# Patient Record
Sex: FEMALE | Race: WHITE | Employment: OTHER | URBAN - METROPOLITAN AREA
[De-identification: names, ages, dates, MRNs, and addresses within clinical notes are randomized per-mention and may not be internally consistent; named-entity substitution may affect disease eponyms.]

---

## 2018-03-14 RX ORDER — ACETAMINOPHEN 500 MG
500 TABLET ORAL EVERY 6 HOURS PRN
COMMUNITY
End: 2021-01-01

## 2018-03-14 NOTE — PROGRESS NOTES
[facility] PRE-ADMISSION TESTING INSTRUCTIONS    The Preadmission Testing patient is instructed accordingly using the following criteria (check applicable):    ARRIVAL INSTRUCTIONS:  [x] Parking the day of Surgery is located in the Main Entrance lot. Upon entering the door, make an immediate right to the surgery reception desk    [x] Complimentary [address] Park[address] is available Monday through Friday 6 am to 6 pm    [x] Bring photo ID and insurance card    [x] Bring in a copy of Living will or Durable Power of  papers. [x] Please be sure to arrange transportation to and from the hospital    [x] Please arrange for someone to be with you for the 24 hour period post procedure due to having anesthesia      GENERAL INSTRUCTIONS:    [x] Nothing by mouth after midnight, including gum, candy, mints or water    [x] You may brush your teeth, but do not swallow any water    [x] Take medications as instructed with 1-2 oz of water    [] Stop herbal supplements and vitamins 5 days prior to procedure    [] Follow preop dosing of blood thinners per physician instructions    [x] Do not take insulin or oral diabetic medications    [x] If diabetic and have low blood sugar or feel symptomatic, take 1-2oz apple juice or glucose tablets    [] Bring inhalers day of surgery    [] Bring C-PAP/ Bi-Pap day of surgery    [] Bring urine specimen day of surgery    [x] Shower or bath with soap, lather and rinse well, AM of Surgery, no lotion, powders or creams to surgical site    [] Follow bowel prep as instructed per surgeon    [x] No tobacco products within 24 hours of surgery     [] No alcohol or illegal drug use within 24 hours of surgery.     [x] Jewelry, body piercing's, eyeglasses, contact lenses and dentures are not permitted into surgery (bring cases)      [x] Please do not wear any nail polish, make up or hair products on the day of surgery    [x] If not already done, you can expect a call from registration    [] You can expect a call the business day prior to procedure to notify you of your arrival time    [x] If you receive a survey after surgery we would greatly appreciate your comments    [] Parent/guardian of a minor must accompany their child and remain on the premises  the entire time they are under our care     [] Pediatric patients may bring favorite toy, blanket or comfort item with them    [] A caregiver or family member must remain with the patient during their stay if they are mentally handicapped, have dementia, disoriented or unable to use a call light or would be a safety concern if left unattended    [x] Please notify surgeon if you develop any illness between now and time of surgery (cold, cough, sore throat, fever, nausea, vomiting) or any signs of infections  including skin, wounds, and dental.    [] Other instructions    EDUCATIONAL MATERIALS PROVIDED:    [] PAT Preoperative Education Packet/Booklet     [] Medication List    [] Fluoroscopy Information Pamphlet    [] Transfusion bracelet applied with instructions    [] Joint replacement video reviewed    [] Shower with soap, lather and rinse well, and use CHG wipes provided the evening before surgery as instructed

## 2018-03-16 ENCOUNTER — HOSPITAL ENCOUNTER (OUTPATIENT)
Age: 83
Setting detail: OUTPATIENT SURGERY
Discharge: HOME | End: 2018-03-16
Attending: OPHTHALMOLOGY | Admitting: OPHTHALMOLOGY
Payer: MEDICARE

## 2018-03-16 ENCOUNTER — ANESTHESIA EVENT (OUTPATIENT)
Dept: OPERATING ROOM | Age: 83
End: 2018-03-16
Payer: MEDICARE

## 2018-03-16 ENCOUNTER — ANESTHESIA (OUTPATIENT)
Dept: OPERATING ROOM | Age: 83
End: 2018-03-16
Payer: MEDICARE

## 2018-03-16 VITALS
SYSTOLIC BLOOD PRESSURE: 121 MMHG | HEIGHT: 60 IN | TEMPERATURE: 97.3 F | BODY MASS INDEX: 18.65 KG/M2 | RESPIRATION RATE: 18 BRPM | OXYGEN SATURATION: 96 % | WEIGHT: 95 LBS | DIASTOLIC BLOOD PRESSURE: 54 MMHG | HEART RATE: 76 BPM

## 2018-03-16 VITALS — SYSTOLIC BLOOD PRESSURE: 112 MMHG | DIASTOLIC BLOOD PRESSURE: 54 MMHG | OXYGEN SATURATION: 95 % | TEMPERATURE: 97.7 F

## 2018-03-16 DIAGNOSIS — Z01.818 PREOP TESTING: ICD-10-CM

## 2018-03-16 PROBLEM — H18.519 CORNEA GUTTATA: Status: ACTIVE | Noted: 2018-03-16

## 2018-03-16 LAB
METER GLUCOSE: 217 MG/DL (ref 70–110)
METER GLUCOSE: 240 MG/DL (ref 70–110)
METER GLUCOSE: 250 MG/DL (ref 70–110)

## 2018-03-16 PROCEDURE — 2500000003 HC RX 250 WO HCPCS: Performed by: OPHTHALMOLOGY

## 2018-03-16 PROCEDURE — 6370000000 HC RX 637 (ALT 250 FOR IP): Performed by: OPHTHALMOLOGY

## 2018-03-16 PROCEDURE — 7100000011 HC PHASE II RECOVERY - ADDTL 15 MIN: Performed by: OPHTHALMOLOGY

## 2018-03-16 PROCEDURE — 2580000003 HC RX 258: Performed by: NURSE ANESTHETIST, CERTIFIED REGISTERED

## 2018-03-16 PROCEDURE — 6360000002 HC RX W HCPCS: Performed by: OPHTHALMOLOGY

## 2018-03-16 PROCEDURE — 82962 GLUCOSE BLOOD TEST: CPT

## 2018-03-16 PROCEDURE — 3700000000 HC ANESTHESIA ATTENDED CARE: Performed by: OPHTHALMOLOGY

## 2018-03-16 PROCEDURE — 3600000002 HC SURGERY LEVEL 2 BASE: Performed by: OPHTHALMOLOGY

## 2018-03-16 PROCEDURE — V2785 CORNEAL TISSUE PROCESSING: HCPCS | Performed by: OPHTHALMOLOGY

## 2018-03-16 PROCEDURE — 2580000003 HC RX 258: Performed by: OPHTHALMOLOGY

## 2018-03-16 PROCEDURE — 3700000001 HC ADD 15 MINUTES (ANESTHESIA): Performed by: OPHTHALMOLOGY

## 2018-03-16 PROCEDURE — 6370000000 HC RX 637 (ALT 250 FOR IP)

## 2018-03-16 PROCEDURE — 3600000012 HC SURGERY LEVEL 2 ADDTL 15MIN: Performed by: OPHTHALMOLOGY

## 2018-03-16 PROCEDURE — 7100000010 HC PHASE II RECOVERY - FIRST 15 MIN: Performed by: OPHTHALMOLOGY

## 2018-03-16 DEVICE — GRAFT HUM TISS: Type: IMPLANTABLE DEVICE | Status: FUNCTIONAL

## 2018-03-16 RX ORDER — BUPIVACAINE HYDROCHLORIDE 7.5 MG/ML
10 INJECTION, SOLUTION EPIDURAL; RETROBULBAR ONCE
Status: DISCONTINUED | OUTPATIENT
Start: 2018-03-16 | End: 2018-03-16 | Stop reason: HOSPADM

## 2018-03-16 RX ORDER — CEPHALEXIN 500 MG/1
500 CAPSULE ORAL ONCE
Status: COMPLETED | OUTPATIENT
Start: 2018-03-16 | End: 2018-03-16

## 2018-03-16 RX ORDER — MOXIFLOXACIN 5 MG/ML
SOLUTION/ DROPS OPHTHALMIC PRN
Status: DISCONTINUED | OUTPATIENT
Start: 2018-03-16 | End: 2018-03-16 | Stop reason: HOSPADM

## 2018-03-16 RX ORDER — BUPIVACAINE HYDROCHLORIDE 7.5 MG/ML
10 INJECTION, SOLUTION EPIDURAL; RETROBULBAR ONCE
Status: COMPLETED | OUTPATIENT
Start: 2018-03-16 | End: 2018-03-16

## 2018-03-16 RX ORDER — PREDNISOLONE ACETATE 10 MG/ML
SUSPENSION/ DROPS OPHTHALMIC PRN
Status: DISCONTINUED | OUTPATIENT
Start: 2018-03-16 | End: 2018-03-16 | Stop reason: HOSPADM

## 2018-03-16 RX ORDER — TETRACAINE HYDROCHLORIDE 5 MG/ML
1 SOLUTION OPHTHALMIC EVERY 5 MIN PRN
Status: DISCONTINUED | OUTPATIENT
Start: 2018-03-16 | End: 2018-03-16 | Stop reason: HOSPADM

## 2018-03-16 RX ORDER — SODIUM CHLORIDE 0.9 % (FLUSH) 0.9 %
10 SYRINGE (ML) INJECTION EVERY 12 HOURS SCHEDULED
Status: DISCONTINUED | OUTPATIENT
Start: 2018-03-16 | End: 2018-03-16 | Stop reason: HOSPADM

## 2018-03-16 RX ORDER — ATROPINE SULFATE 10 MG/ML
SOLUTION/ DROPS OPHTHALMIC PRN
Status: DISCONTINUED | OUTPATIENT
Start: 2018-03-16 | End: 2018-03-16 | Stop reason: HOSPADM

## 2018-03-16 RX ORDER — SODIUM CHLORIDE 0.9 % (FLUSH) 0.9 %
10 SYRINGE (ML) INJECTION PRN
Status: DISCONTINUED | OUTPATIENT
Start: 2018-03-16 | End: 2018-03-16 | Stop reason: HOSPADM

## 2018-03-16 RX ORDER — DIAZEPAM 5 MG/1
5 TABLET ORAL ONCE
Status: COMPLETED | OUTPATIENT
Start: 2018-03-16 | End: 2018-03-16

## 2018-03-16 RX ORDER — MOXIFLOXACIN 5 MG/ML
1 SOLUTION/ DROPS OPHTHALMIC EVERY 5 MIN PRN
Status: COMPLETED | OUTPATIENT
Start: 2018-03-16 | End: 2018-03-16

## 2018-03-16 RX ORDER — KETOROLAC TROMETHAMINE 5 MG/ML
1 SOLUTION OPHTHALMIC
Status: COMPLETED | OUTPATIENT
Start: 2018-03-16 | End: 2018-03-16

## 2018-03-16 RX ORDER — PILOCARPINE HYDROCHLORIDE 20 MG/ML
2 SOLUTION/ DROPS OPHTHALMIC
Status: COMPLETED | OUTPATIENT
Start: 2018-03-16 | End: 2018-03-16

## 2018-03-16 RX ORDER — SODIUM CHLORIDE 9 MG/ML
INJECTION, SOLUTION INTRAVENOUS CONTINUOUS PRN
Status: DISCONTINUED | OUTPATIENT
Start: 2018-03-16 | End: 2018-03-16 | Stop reason: SDUPTHER

## 2018-03-16 RX ORDER — KETOROLAC TROMETHAMINE 5 MG/ML
SOLUTION OPHTHALMIC PRN
Status: DISCONTINUED | OUTPATIENT
Start: 2018-03-16 | End: 2018-03-16 | Stop reason: HOSPADM

## 2018-03-16 RX ADMIN — Medication 1 DROP: at 11:40

## 2018-03-16 RX ADMIN — Medication 1 DROP: at 11:35

## 2018-03-16 RX ADMIN — Medication 1 DROP: at 11:56

## 2018-03-16 RX ADMIN — PILOCARPINE HYDROCHLORIDE 2 DROP: 20 SOLUTION/ DROPS OPHTHALMIC at 11:30

## 2018-03-16 RX ADMIN — SODIUM CHLORIDE: 9 INJECTION, SOLUTION INTRAVENOUS at 12:49

## 2018-03-16 RX ADMIN — PILOCARPINE HYDROCHLORIDE 2 DROP: 20 SOLUTION/ DROPS OPHTHALMIC at 11:40

## 2018-03-16 RX ADMIN — Medication 1 DROP: at 11:30

## 2018-03-16 RX ADMIN — Medication 1 DROP: at 11:50

## 2018-03-16 RX ADMIN — BUPIVACAINE HYDROCHLORIDE 75 MG: 7.5 INJECTION, SOLUTION EPIDURAL; RETROBULBAR at 11:55

## 2018-03-16 RX ADMIN — DIAZEPAM 5 MG: 5 TABLET ORAL at 11:59

## 2018-03-16 RX ADMIN — CEPHALEXIN 500 MG: 500 CAPSULE ORAL at 11:59

## 2018-03-16 ASSESSMENT — PULMONARY FUNCTION TESTS
PIF_VALUE: 1

## 2018-03-16 ASSESSMENT — LIFESTYLE VARIABLES: SMOKING_STATUS: 1

## 2018-03-16 ASSESSMENT — PAIN SCALES - GENERAL
PAINLEVEL_OUTOF10: 0
PAINLEVEL_OUTOF10: 0

## 2018-03-16 ASSESSMENT — ENCOUNTER SYMPTOMS: SHORTNESS OF BREATH: 0

## 2018-03-16 NOTE — ANESTHESIA PRE PROCEDURE
Provider Last Rate Last Dose    sodium chloride flush 0.9 % injection 10 mL  10 mL Intravenous 2 times per day Jeb Capps MD        sodium chloride flush 0.9 % injection 10 mL  10 mL Intravenous PRN Jeb Capps MD        bupivacaine (PF) (MARCAINE) 0.75 % injection 75 mg  10 mL Ophthalmic Once Cruz Peralta MD        tetracaine (TETRAVISC) 0.5 % ophthalmic solution 1 drop  1 drop Ophthalmic Q5 Min PRN Jeb Capps MD        moxifloxacin (VIGAMOX) 0.5 % ophthalmic solution 1 drop  1 drop Ophthalmic Q5 Min PRN Jeb Capps MD        cephALEXin (KEFLEX) capsule 500 mg  500 mg Oral Once Cruz Peralta MD        diazepam (VALIUM) tablet 5 mg  5 mg Oral Once Cruz Peralta MD        pilocarpine (PILOCAR) 2 % ophthalmic solution 2 drop  2 drop Right Eye Q10 Min Jeb Capps MD        bupivacaine (PF) (MARCAINE) 0.75 % injection 75 mg  10 mL Ophthalmic Once Cruz Peralta MD        ketorolac (ACULAR) 0.5 % ophthalmic solution 1 drop  1 drop Right Eye Q10 Min Jeb Capps MD           Allergies:  No Known Allergies    Problem List:  There is no problem list on file for this patient.       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

## 2018-03-16 NOTE — LETTER
[address] [facility] [address]  Phone: [phone]    No name on file. March 16, 2018     Patient: Rebeca Bloom   YOB: 1934   Date of Visit: 3/16/2018       To Whom it May Concern:    Bedelia Epley was seen in my clinic on 3/16/2018 for eye surgery. She was accompanied by her family. If you have any questions or concerns, please don't hesitate to call.     Sincerely,         Patient of Dr. Crystal Mireles

## 2018-03-24 NOTE — OP NOTE
Date of Procedure:  Mar. 18. 2016    Surgeon:  Dr. Gracie Forbes    Assistant: Saida Radha Knowles      Preoperative Diagnosis:  Fused corneal dystrophy, pseudophakia, right eye    Postoperative Diagnosis:  Fused corneal dystrophy, pseudophakia, same eye    Operation:  DSEAK (Descemet stripping endothelial automatic keratoplasty), same eye    Anesthesia:  Marcaine solution 0.75% peribulbar injection in the preoperative area, then the right eye was compressed with [place] for 10 minutes off, 5 minutes on x2. Estimated Blood Loss:none    Complications:    Procedure: The patient was brought to surgery and placed in supine position. The right eye was prepped with Betadine solution and draped in adequate fashion. The patient's eye was opened with a wire speculum. Inspection of the eye revealed the pupil was constricted. There was a well-centered posterior chamber lens implant with good red reflex. There was also a moderate amount of corneal guttata with central corneal edema. There is a patent PI at 6 O'clock. The eye was otherwise clear. Three stab incisions at the limbus at the upper nasal, upper temporal, and inferior temporal positions. Then, the 8.0-mm optic zone size of donovan was placed on the cornea. Under Inder in [facility] the endothelium cut with reverse Sinsky inside premark area and strip away w/ stripper. .  A sweep action was made just inside the premarked endothelium and then gently stripped the endothelial from the inside surface of the cornea. This action was completed with assistance of the I/A handpiece to remove. It was not able to make it one huge flap, but it was taken out in pieces. [place] in [facility] removed w/ I/A hand-piece. The corneal opening where the I/A entrance was,  enlarged to 4 mm. Also, 3 stab incisions for venting purpose were made about 6mm optical zone on the cornea with a sharp racquel blade.     The donor cornea was precutted by [facility], The donor cornea was punched with a 8.5

## 2018-09-09 ENCOUNTER — HOSPITAL ENCOUNTER (INPATIENT)
Age: 83
LOS: 2 days | Discharge: HOME | DRG: 194 | End: 2018-09-11
Attending: EMERGENCY MEDICINE | Admitting: FAMILY MEDICINE
Payer: MEDICARE

## 2018-09-09 ENCOUNTER — APPOINTMENT (OUTPATIENT)
Dept: GENERAL RADIOLOGY | Age: 83
DRG: 194 | End: 2018-09-09
Payer: MEDICARE

## 2018-09-09 DIAGNOSIS — R73.9 HYPERGLYCEMIA: ICD-10-CM

## 2018-09-09 DIAGNOSIS — E86.0 DEHYDRATION: ICD-10-CM

## 2018-09-09 DIAGNOSIS — J44.1 COPD EXACERBATION (HCC): ICD-10-CM

## 2018-09-09 DIAGNOSIS — J18.9 COMMUNITY ACQUIRED PNEUMONIA, UNSPECIFIED LATERALITY: Primary | ICD-10-CM

## 2018-09-09 LAB
ALBUMIN SERPL-MCNC: 3.1 G/DL (ref 3.5–5.2)
ALP BLD-CCNC: 64 U/L (ref 35–104)
ALT SERPL-CCNC: 10 U/L (ref 0–32)
ANION GAP SERPL CALCULATED.3IONS-SCNC: 5 MMOL/L (ref 7–16)
AST SERPL-CCNC: 13 U/L (ref 0–31)
BASOPHILS ABSOLUTE: 0.02 E9/L (ref 0–0.2)
BASOPHILS RELATIVE PERCENT: 0.3 % (ref 0–2)
BETA-HYDROXYBUTYRATE: 0.06 MMOL/L (ref 0.02–0.27)
BILIRUB SERPL-MCNC: 0.3 MG/DL (ref 0–1.2)
BUN BLDV-MCNC: 16 MG/DL (ref 8–23)
CALCIUM SERPL-MCNC: 8.5 MG/DL (ref 8.6–10.2)
CHLORIDE BLD-SCNC: 93 MMOL/L (ref 98–107)
CHP ED QC CHECK: YES
CHP ED QC CHECK: YES
CO2: 36 MMOL/L (ref 22–29)
CREAT SERPL-MCNC: 0.7 MG/DL (ref 0.5–1)
EOSINOPHILS ABSOLUTE: 0.1 E9/L (ref 0.05–0.5)
EOSINOPHILS RELATIVE PERCENT: 1.6 % (ref 0–6)
GFR AFRICAN AMERICAN: >60
GFR NON-AFRICAN AMERICAN: >60 ML/MIN/1.73
GLUCOSE BLD-MCNC: 427 MG/DL
GLUCOSE BLD-MCNC: 497 MG/DL
GLUCOSE BLD-MCNC: 500 MG/DL (ref 74–109)
HCT VFR BLD CALC: 39.7 % (ref 34–48)
HEMOGLOBIN: 12.6 G/DL (ref 11.5–15.5)
IMMATURE GRANULOCYTES #: 0.04 E9/L
IMMATURE GRANULOCYTES %: 0.6 % (ref 0–5)
LACTIC ACID: 0.9 MMOL/L (ref 0.5–2.2)
LYMPHOCYTES ABSOLUTE: 1.19 E9/L (ref 1.5–4)
LYMPHOCYTES RELATIVE PERCENT: 19.2 % (ref 20–42)
MCH RBC QN AUTO: 31.6 PG (ref 26–35)
MCHC RBC AUTO-ENTMCNC: 31.7 % (ref 32–34.5)
MCV RBC AUTO: 99.5 FL (ref 80–99.9)
METER GLUCOSE: 366 MG/DL (ref 70–110)
METER GLUCOSE: 427 MG/DL (ref 70–110)
METER GLUCOSE: 497 MG/DL (ref 70–110)
MONOCYTES ABSOLUTE: 0.52 E9/L (ref 0.1–0.95)
MONOCYTES RELATIVE PERCENT: 8.4 % (ref 2–12)
NEUTROPHILS ABSOLUTE: 4.33 E9/L (ref 1.8–7.3)
NEUTROPHILS RELATIVE PERCENT: 69.9 % (ref 43–80)
PDW BLD-RTO: 12.4 FL (ref 11.5–15)
PH VENOUS: 7.33 (ref 7.3–7.42)
PLATELET # BLD: 284 E9/L (ref 130–450)
PMV BLD AUTO: 9.7 FL (ref 7–12)
POTASSIUM SERPL-SCNC: 5 MMOL/L (ref 3.5–5)
RBC # BLD: 3.99 E12/L (ref 3.5–5.5)
SODIUM BLD-SCNC: 134 MMOL/L (ref 132–146)
TOTAL PROTEIN: 6.3 G/DL (ref 6.4–8.3)
TROPONIN: <0.01 NG/ML (ref 0–0.03)
WBC # BLD: 6.2 E9/L (ref 4.5–11.5)

## 2018-09-09 PROCEDURE — 71045 X-RAY EXAM CHEST 1 VIEW: CPT

## 2018-09-09 PROCEDURE — G0378 HOSPITAL OBSERVATION PER HR: HCPCS

## 2018-09-09 PROCEDURE — 6360000002 HC RX W HCPCS: Performed by: NURSE PRACTITIONER

## 2018-09-09 PROCEDURE — 80053 COMPREHEN METABOLIC PANEL: CPT

## 2018-09-09 PROCEDURE — 85025 COMPLETE CBC W/AUTO DIFF WBC: CPT

## 2018-09-09 PROCEDURE — 2500000003 HC RX 250 WO HCPCS: Performed by: NURSE PRACTITIONER

## 2018-09-09 PROCEDURE — 2140000000 HC CCU INTERMEDIATE R&B

## 2018-09-09 PROCEDURE — 87040 BLOOD CULTURE FOR BACTERIA: CPT

## 2018-09-09 PROCEDURE — 84484 ASSAY OF TROPONIN QUANT: CPT

## 2018-09-09 PROCEDURE — 36415 COLL VENOUS BLD VENIPUNCTURE: CPT

## 2018-09-09 PROCEDURE — 82800 BLOOD PH: CPT

## 2018-09-09 PROCEDURE — 6370000000 HC RX 637 (ALT 250 FOR IP): Performed by: EMERGENCY MEDICINE

## 2018-09-09 PROCEDURE — 96365 THER/PROPH/DIAG IV INF INIT: CPT

## 2018-09-09 PROCEDURE — 96375 TX/PRO/DX INJ NEW DRUG ADDON: CPT

## 2018-09-09 PROCEDURE — 93005 ELECTROCARDIOGRAM TRACING: CPT

## 2018-09-09 PROCEDURE — 6370000000 HC RX 637 (ALT 250 FOR IP): Performed by: NURSE PRACTITIONER

## 2018-09-09 PROCEDURE — 2580000003 HC RX 258: Performed by: NURSE PRACTITIONER

## 2018-09-09 PROCEDURE — 82962 GLUCOSE BLOOD TEST: CPT

## 2018-09-09 PROCEDURE — 94761 N-INVAS EAR/PLS OXIMETRY MLT: CPT

## 2018-09-09 PROCEDURE — 83605 ASSAY OF LACTIC ACID: CPT

## 2018-09-09 PROCEDURE — 2700000000 HC OXYGEN THERAPY PER DAY

## 2018-09-09 PROCEDURE — 94664 DEMO&/EVAL PT USE INHALER: CPT

## 2018-09-09 PROCEDURE — 2580000003 HC RX 258: Performed by: EMERGENCY MEDICINE

## 2018-09-09 PROCEDURE — 99285 EMERGENCY DEPT VISIT HI MDM: CPT

## 2018-09-09 PROCEDURE — 82010 KETONE BODYS QUAN: CPT

## 2018-09-09 RX ORDER — PANTOPRAZOLE SODIUM 40 MG/1
40 TABLET, DELAYED RELEASE ORAL
Status: DISCONTINUED | OUTPATIENT
Start: 2018-09-10 | End: 2018-09-11 | Stop reason: HOSPADM

## 2018-09-09 RX ORDER — 0.9 % SODIUM CHLORIDE 0.9 %
1000 INTRAVENOUS SOLUTION INTRAVENOUS ONCE
Status: COMPLETED | OUTPATIENT
Start: 2018-09-09 | End: 2018-09-09

## 2018-09-09 RX ORDER — MELOXICAM 7.5 MG/1
7.5 TABLET ORAL DAILY
Status: DISCONTINUED | OUTPATIENT
Start: 2018-09-10 | End: 2018-09-11 | Stop reason: HOSPADM

## 2018-09-09 RX ORDER — PREDNISOLONE ACETATE 10 MG/ML
1 SUSPENSION/ DROPS OPHTHALMIC DAILY
Status: DISCONTINUED | OUTPATIENT
Start: 2018-09-10 | End: 2018-09-11 | Stop reason: HOSPADM

## 2018-09-09 RX ORDER — CALCITONIN SALMON 200 [IU]/.09ML
1 SPRAY, METERED NASAL DAILY
Status: DISCONTINUED | OUTPATIENT
Start: 2018-09-10 | End: 2018-09-09 | Stop reason: CLARIF

## 2018-09-09 RX ORDER — ACETAMINOPHEN 500 MG
500 TABLET ORAL EVERY 6 HOURS PRN
Status: DISCONTINUED | OUTPATIENT
Start: 2018-09-09 | End: 2018-09-09 | Stop reason: SDUPTHER

## 2018-09-09 RX ORDER — ATORVASTATIN CALCIUM 40 MG/1
40 TABLET, FILM COATED ORAL NIGHTLY
Status: DISCONTINUED | OUTPATIENT
Start: 2018-09-09 | End: 2018-09-11 | Stop reason: HOSPADM

## 2018-09-09 RX ORDER — CYANOCOBALAMIN 1000 UG/ML
1000 INJECTION INTRAMUSCULAR; SUBCUTANEOUS
COMMUNITY
End: 2021-01-01

## 2018-09-09 RX ORDER — SODIUM CHLORIDE 0.9 % (FLUSH) 0.9 %
10 SYRINGE (ML) INJECTION PRN
Status: DISCONTINUED | OUTPATIENT
Start: 2018-09-09 | End: 2018-09-11 | Stop reason: HOSPADM

## 2018-09-09 RX ORDER — SODIUM CHLORIDE 0.9 % (FLUSH) 0.9 %
10 SYRINGE (ML) INJECTION EVERY 12 HOURS SCHEDULED
Status: DISCONTINUED | OUTPATIENT
Start: 2018-09-09 | End: 2018-09-11 | Stop reason: HOSPADM

## 2018-09-09 RX ORDER — LEVOTHYROXINE SODIUM 0.05 MG/1
50 TABLET ORAL DAILY
Status: DISCONTINUED | OUTPATIENT
Start: 2018-09-10 | End: 2018-09-11 | Stop reason: HOSPADM

## 2018-09-09 RX ORDER — IPRATROPIUM BROMIDE AND ALBUTEROL SULFATE 2.5; .5 MG/3ML; MG/3ML
1 SOLUTION RESPIRATORY (INHALATION) ONCE
Status: COMPLETED | OUTPATIENT
Start: 2018-09-09 | End: 2018-09-09

## 2018-09-09 RX ORDER — ACETAMINOPHEN 325 MG/1
650 TABLET ORAL EVERY 4 HOURS PRN
Status: DISCONTINUED | OUTPATIENT
Start: 2018-09-09 | End: 2018-09-11 | Stop reason: HOSPADM

## 2018-09-09 RX ORDER — BUMETANIDE 1 MG/1
1 TABLET ORAL DAILY
Status: DISCONTINUED | OUTPATIENT
Start: 2018-09-10 | End: 2018-09-11 | Stop reason: HOSPADM

## 2018-09-09 RX ADMIN — INSULIN HUMAN 5 UNITS: 100 INJECTION, SOLUTION PARENTERAL at 21:01

## 2018-09-09 RX ADMIN — IPRATROPIUM BROMIDE AND ALBUTEROL SULFATE 1 AMPULE: .5; 3 SOLUTION RESPIRATORY (INHALATION) at 19:54

## 2018-09-09 RX ADMIN — DOXYCYCLINE 200 MG: 100 INJECTION, POWDER, LYOPHILIZED, FOR SOLUTION INTRAVENOUS at 21:00

## 2018-09-09 RX ADMIN — SODIUM CHLORIDE 1000 ML: 9 INJECTION, SOLUTION INTRAVENOUS at 19:53

## 2018-09-09 RX ADMIN — CEFTRIAXONE SODIUM 2 G: 2 INJECTION, POWDER, FOR SOLUTION INTRAMUSCULAR; INTRAVENOUS at 20:25

## 2018-09-10 ENCOUNTER — APPOINTMENT (OUTPATIENT)
Dept: CT IMAGING | Age: 83
DRG: 194 | End: 2018-09-10
Payer: MEDICARE

## 2018-09-10 LAB
ANION GAP SERPL CALCULATED.3IONS-SCNC: 10 MMOL/L (ref 7–16)
BACTERIA: ABNORMAL /HPF
BILIRUBIN URINE: NEGATIVE
BLOOD, URINE: NEGATIVE
BUN BLDV-MCNC: 14 MG/DL (ref 8–23)
CALCIUM SERPL-MCNC: 8.5 MG/DL (ref 8.6–10.2)
CHLORIDE BLD-SCNC: 101 MMOL/L (ref 98–107)
CLARITY: CLEAR
CO2: 32 MMOL/L (ref 22–29)
COLOR: YELLOW
CREAT SERPL-MCNC: 0.6 MG/DL (ref 0.5–1)
FILM ARRAY ADENOVIRUS: NORMAL
FILM ARRAY BORDETELLA PERTUSSIS: NORMAL
FILM ARRAY CHLAMYDOPHILIA PNEUMONIAE: NORMAL
FILM ARRAY CORONAVIRUS 229E: NORMAL
FILM ARRAY CORONAVIRUS HKU1: NORMAL
FILM ARRAY CORONAVIRUS NL63: NORMAL
FILM ARRAY CORONAVIRUS OC43: NORMAL
FILM ARRAY INFLUENZA A VIRUS 09H1: NORMAL
FILM ARRAY INFLUENZA A VIRUS H1: NORMAL
FILM ARRAY INFLUENZA A VIRUS H3: NORMAL
FILM ARRAY INFLUENZA A VIRUS: NORMAL
FILM ARRAY INFLUENZA B: NORMAL
FILM ARRAY METAPNEUMOVIRUS: NORMAL
FILM ARRAY MYCOPLASMA PNEUMONIAE: NORMAL
FILM ARRAY PARAINFLUENZA VIRUS 1: NORMAL
FILM ARRAY PARAINFLUENZA VIRUS 2: NORMAL
FILM ARRAY PARAINFLUENZA VIRUS 3: NORMAL
FILM ARRAY PARAINFLUENZA VIRUS 4: NORMAL
FILM ARRAY RESPIRATORY SYNCITIAL VIRUS: NORMAL
FILM ARRAY RHINOVIRUS/ENTEROVIRUS: NORMAL
GFR AFRICAN AMERICAN: >60
GFR NON-AFRICAN AMERICAN: >60 ML/MIN/1.73
GLUCOSE BLD-MCNC: 92 MG/DL (ref 74–109)
GLUCOSE URINE: 250 MG/DL
HCT VFR BLD CALC: 34.9 % (ref 34–48)
HEMOGLOBIN: 11.3 G/DL (ref 11.5–15.5)
KETONES, URINE: NEGATIVE MG/DL
LEUKOCYTE ESTERASE, URINE: ABNORMAL
MCH RBC QN AUTO: 32 PG (ref 26–35)
MCHC RBC AUTO-ENTMCNC: 32.4 % (ref 32–34.5)
MCV RBC AUTO: 98.9 FL (ref 80–99.9)
METER GLUCOSE: 106 MG/DL (ref 70–110)
METER GLUCOSE: 195 MG/DL (ref 70–110)
METER GLUCOSE: 198 MG/DL (ref 70–110)
METER GLUCOSE: 261 MG/DL (ref 70–110)
METER GLUCOSE: 353 MG/DL (ref 70–110)
METER GLUCOSE: 74 MG/DL (ref 70–110)
NITRITE, URINE: POSITIVE
PDW BLD-RTO: 12.6 FL (ref 11.5–15)
PH UA: 6 (ref 5–9)
PLATELET # BLD: 279 E9/L (ref 130–450)
PMV BLD AUTO: 9.8 FL (ref 7–12)
POTASSIUM SERPL-SCNC: 4.1 MMOL/L (ref 3.5–5)
PROCALCITONIN: 0.03 NG/ML (ref 0–0.08)
PROTEIN UA: NEGATIVE MG/DL
RBC # BLD: 3.53 E12/L (ref 3.5–5.5)
RBC UA: ABNORMAL /HPF (ref 0–2)
SODIUM BLD-SCNC: 143 MMOL/L (ref 132–146)
SPECIFIC GRAVITY UA: <=1.005 (ref 1–1.03)
UROBILINOGEN, URINE: 0.2 E.U./DL
WBC # BLD: 8.3 E9/L (ref 4.5–11.5)
WBC UA: >20 /HPF (ref 0–5)

## 2018-09-10 PROCEDURE — 2140000000 HC CCU INTERMEDIATE R&B

## 2018-09-10 PROCEDURE — 2580000003 HC RX 258: Performed by: FAMILY MEDICINE

## 2018-09-10 PROCEDURE — 85027 COMPLETE CBC AUTOMATED: CPT

## 2018-09-10 PROCEDURE — 6360000002 HC RX W HCPCS: Performed by: FAMILY MEDICINE

## 2018-09-10 PROCEDURE — 87502 INFLUENZA DNA AMP PROBE: CPT

## 2018-09-10 PROCEDURE — 82104 ALPHA-1-ANTITRYPSIN PHENO: CPT

## 2018-09-10 PROCEDURE — 87798 DETECT AGENT NOS DNA AMP: CPT

## 2018-09-10 PROCEDURE — 87581 M.PNEUMON DNA AMP PROBE: CPT

## 2018-09-10 PROCEDURE — 71250 CT THORAX DX C-: CPT

## 2018-09-10 PROCEDURE — 84145 PROCALCITONIN (PCT): CPT

## 2018-09-10 PROCEDURE — 87486 CHLMYD PNEUM DNA AMP PROBE: CPT

## 2018-09-10 PROCEDURE — 87503 INFLUENZA DNA AMP PROB ADDL: CPT

## 2018-09-10 PROCEDURE — 87070 CULTURE OTHR SPECIMN AEROBIC: CPT

## 2018-09-10 PROCEDURE — G0378 HOSPITAL OBSERVATION PER HR: HCPCS

## 2018-09-10 PROCEDURE — 6370000000 HC RX 637 (ALT 250 FOR IP): Performed by: FAMILY MEDICINE

## 2018-09-10 PROCEDURE — 81001 URINALYSIS AUTO W/SCOPE: CPT

## 2018-09-10 PROCEDURE — 82103 ALPHA-1-ANTITRYPSIN TOTAL: CPT

## 2018-09-10 PROCEDURE — 36415 COLL VENOUS BLD VENIPUNCTURE: CPT

## 2018-09-10 PROCEDURE — 2700000000 HC OXYGEN THERAPY PER DAY

## 2018-09-10 PROCEDURE — 96372 THER/PROPH/DIAG INJ SC/IM: CPT

## 2018-09-10 PROCEDURE — 82962 GLUCOSE BLOOD TEST: CPT

## 2018-09-10 PROCEDURE — 87205 SMEAR GRAM STAIN: CPT

## 2018-09-10 PROCEDURE — 2500000003 HC RX 250 WO HCPCS: Performed by: FAMILY MEDICINE

## 2018-09-10 PROCEDURE — 80048 BASIC METABOLIC PNL TOTAL CA: CPT

## 2018-09-10 RX ORDER — DEXTROSE MONOHYDRATE 50 MG/ML
100 INJECTION, SOLUTION INTRAVENOUS PRN
Status: DISCONTINUED | OUTPATIENT
Start: 2018-09-10 | End: 2018-09-11 | Stop reason: HOSPADM

## 2018-09-10 RX ORDER — NICOTINE POLACRILEX 4 MG
15 LOZENGE BUCCAL PRN
Status: DISCONTINUED | OUTPATIENT
Start: 2018-09-10 | End: 2018-09-11 | Stop reason: HOSPADM

## 2018-09-10 RX ORDER — INSULIN GLARGINE 100 [IU]/ML
10 INJECTION, SOLUTION SUBCUTANEOUS 2 TIMES DAILY
Status: DISCONTINUED | OUTPATIENT
Start: 2018-09-10 | End: 2018-09-11 | Stop reason: HOSPADM

## 2018-09-10 RX ORDER — DEXTROSE MONOHYDRATE 25 G/50ML
12.5 INJECTION, SOLUTION INTRAVENOUS PRN
Status: DISCONTINUED | OUTPATIENT
Start: 2018-09-10 | End: 2018-09-11 | Stop reason: HOSPADM

## 2018-09-10 RX ADMIN — TIOTROPIUM BROMIDE 18 MCG: 18 CAPSULE ORAL; RESPIRATORY (INHALATION) at 08:45

## 2018-09-10 RX ADMIN — DOXYCYCLINE 100 MG: 100 INJECTION, POWDER, LYOPHILIZED, FOR SOLUTION INTRAVENOUS at 08:44

## 2018-09-10 RX ADMIN — INSULIN LISPRO 3 UNITS: 100 INJECTION, SOLUTION INTRAVENOUS; SUBCUTANEOUS at 21:35

## 2018-09-10 RX ADMIN — LEVOTHYROXINE SODIUM 50 MCG: 50 TABLET ORAL at 06:39

## 2018-09-10 RX ADMIN — Medication 10 ML: at 09:55

## 2018-09-10 RX ADMIN — Medication 10 ML: at 08:45

## 2018-09-10 RX ADMIN — BUMETANIDE 1 MG: 1 TABLET ORAL at 08:45

## 2018-09-10 RX ADMIN — CEFTRIAXONE SODIUM 1 G: 1 INJECTION, POWDER, FOR SOLUTION INTRAMUSCULAR; INTRAVENOUS at 20:38

## 2018-09-10 RX ADMIN — ATORVASTATIN CALCIUM 40 MG: 40 TABLET, FILM COATED ORAL at 00:56

## 2018-09-10 RX ADMIN — INSULIN LISPRO 5 UNITS: 100 INJECTION, SOLUTION INTRAVENOUS; SUBCUTANEOUS at 01:55

## 2018-09-10 RX ADMIN — PREDNISOLONE ACETATE 1 DROP: 10 SUSPENSION/ DROPS OPHTHALMIC at 08:45

## 2018-09-10 RX ADMIN — Medication 10 ML: at 00:52

## 2018-09-10 RX ADMIN — ATORVASTATIN CALCIUM 40 MG: 40 TABLET, FILM COATED ORAL at 20:35

## 2018-09-10 RX ADMIN — MELOXICAM 7.5 MG: 7.5 TABLET ORAL at 08:45

## 2018-09-10 RX ADMIN — INSULIN GLARGINE 10 UNITS: 100 INJECTION, SOLUTION SUBCUTANEOUS at 01:54

## 2018-09-10 RX ADMIN — DOXYCYCLINE 100 MG: 100 INJECTION, POWDER, LYOPHILIZED, FOR SOLUTION INTRAVENOUS at 21:39

## 2018-09-10 RX ADMIN — ACETAMINOPHEN 650 MG: 325 TABLET, FILM COATED ORAL at 17:17

## 2018-09-10 RX ADMIN — INSULIN LISPRO 2 UNITS: 100 INJECTION, SOLUTION INTRAVENOUS; SUBCUTANEOUS at 11:09

## 2018-09-10 RX ADMIN — ENOXAPARIN SODIUM 40 MG: 40 INJECTION SUBCUTANEOUS at 08:44

## 2018-09-10 RX ADMIN — INSULIN GLARGINE 10 UNITS: 100 INJECTION, SOLUTION SUBCUTANEOUS at 21:35

## 2018-09-10 RX ADMIN — Medication 10 ML: at 20:35

## 2018-09-10 RX ADMIN — PANTOPRAZOLE SODIUM 40 MG: 40 TABLET, DELAYED RELEASE ORAL at 06:39

## 2018-09-10 ASSESSMENT — PAIN SCALES - GENERAL
PAINLEVEL_OUTOF10: 0
PAINLEVEL_OUTOF10: 10
PAINLEVEL_OUTOF10: 0
PAINLEVEL_OUTOF10: 0

## 2018-09-10 ASSESSMENT — ENCOUNTER SYMPTOMS
SHORTNESS OF BREATH: 1
BLURRED VISION: 0
COUGH: 1
HEARTBURN: 0

## 2018-09-10 ASSESSMENT — PAIN DESCRIPTION - PAIN TYPE: TYPE: ACUTE PAIN;CHRONIC PAIN

## 2018-09-10 NOTE — PLAN OF CARE
Problem: Falls - Risk of:  Goal: Will remain free from falls  Will remain free from falls   Outcome: Met This Shift    Goal: Absence of physical injury  Absence of physical injury   Outcome: Met This Shift      Problem: Discharge Planning:  Goal: Participates in care planning  Participates in care planning   Outcome: Met This Shift      Problem: Airway Clearance - Ineffective:  Goal: Ability to maintain a clear airway will improve  Ability to maintain a clear airway will improve   Outcome: Met This Shift      Problem: Fluid Volume - Imbalance:  Goal: Absence of imbalanced fluid volume signs and symptoms  Absence of imbalanced fluid volume signs and symptoms   Outcome: Met This Shift      Problem: Gas Exchange - Impaired:  Goal: Levels of oxygenation will improve  Levels of oxygenation will improve   Outcome: Met This Shift      Problem: Serum Glucose Level - Abnormal:  Goal: Ability to maintain appropriate glucose levels will improve to within specified parameters  Ability to maintain appropriate glucose levels will improve to within specified parameters   Outcome: Met This Shift      Problem: Tissue Perfusion, Altered:  Goal: Circulatory function within specified parameters  Circulatory function within specified parameters   Outcome: Met This Shift

## 2018-09-10 NOTE — H&P
Shante Bello is an 80 y.o.  female. Patient presented to the ER for fatigue, cough. She was noted to have very elevated blood sugars as well. 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 No pertinent family history.      Social History   Substance Use Topics    Smoking status: Current Every Day Smoker     Packs/day: 0.75     Years: 59.00    Smokeless tobacco: Never Used    Alcohol use No       Current Facility-Administered Medications   Medication Dose Route Frequency Provider Last Rate Last Dose    insulin lispro (HUMALOG) injection vial 0-12 Units  0-12 Units Subcutaneous TID WC Shayna Heranndez MD        insulin lispro (HUMALOG) injection vial 0-6 Units  0-6 Units Subcutaneous Nightly Shayna Hernandez MD   5 Units at 09/10/18 0155    glucose (GLUTOSE) 40 % oral gel 15 g  15 g Oral PRN Shayna Hernandez MD        dextrose 50 % solution 12.5 g  12.5 g Intravenous PRN Shayna Hernandez MD        glucagon (rDNA) injection 1 mg  1 mg Intramuscular PRN Shayna Hernandez MD        dextrose 5 % solution  100 mL/hr Intravenous PRN Shayna Hernandez MD        insulin glargine (LANTUS) injection vial 10 Units  10 Units Subcutaneous BID Francisco Gomez and shortness of breath. Cardiovascular: Negative for chest pain. Gastrointestinal: Negative for heartburn. Genitourinary: Negative for dysuria. Musculoskeletal: Negative for myalgias. Skin: Negative for rash. Neurological: Negative for dizziness. Psychiatric/Behavioral: Negative for depression. Physical Exam   Constitutional: She is oriented to person, place, and time. She appears well-developed and well-nourished. HENT:   Head: Normocephalic. Eyes: Pupils are equal, round, and reactive to light. Conjunctivae are normal.   Neck: Normal range of motion. Neck supple. No tracheal deviation present. No thyromegaly present. Cardiovascular: Normal rate, regular rhythm and normal heart sounds. Pulmonary/Chest: Effort normal. No respiratory distress. She has wheezes. Abdominal: Soft. Bowel sounds are normal. She exhibits no distension. There is no tenderness. Neurological: She is alert and oriented to person, place, and time. Skin: Skin is warm and dry. Psychiatric: She has a normal mood and affect. Her behavior is normal.       Assessment:  Pneumonia, organism unspecified  Uncontrolled DM  COPD  Hyperlipidemia    Plan:  Started doxy and rocephin. Pulmonary to see for this as well as lung nodules seen on CXR. Monitor labs and cultures. Oxygen and nebs as needed. Continue home meds. Adjust insulin as needed.     Jono Ramirez MD  9/10/2018

## 2018-09-10 NOTE — CONSULTS
Alternates nares daily   Yes Historical Provider, MD   levothyroxine (SYNTHROID) 25 MCG tablet Take 50 mcg by mouth Daily    Yes Historical Provider, MD   meloxicam (MOBIC) 7.5 MG tablet Take 7.5 mg by mouth daily Patient to check if to hold   Yes Historical Provider, MD       CURRENT MEDICATIONS:  Current Facility-Administered Medications: insulin lispro (HUMALOG) injection vial 0-12 Units, 0-12 Units, Subcutaneous, TID WC  insulin lispro (HUMALOG) injection vial 0-6 Units, 0-6 Units, Subcutaneous, Nightly  glucose (GLUTOSE) 40 % oral gel 15 g, 15 g, Oral, PRN  dextrose 50 % solution 12.5 g, 12.5 g, Intravenous, PRN  glucagon (rDNA) injection 1 mg, 1 mg, Intramuscular, PRN  dextrose 5 % solution, 100 mL/hr, Intravenous, PRN  insulin glargine (LANTUS) injection vial 10 Units, 10 Units, Subcutaneous, BID  sodium chloride flush 0.9 % injection 10 mL, 10 mL, Intravenous, 2 times per day  sodium chloride flush 0.9 % injection 10 mL, 10 mL, Intravenous, PRN  acetaminophen (TYLENOL) tablet 650 mg, 650 mg, Oral, Q4H PRN  enoxaparin (LOVENOX) injection 40 mg, 40 mg, Subcutaneous, Daily  bumetanide (BUMEX) tablet 1 mg, 1 mg, Oral, Daily  levothyroxine (SYNTHROID) tablet 50 mcg, 50 mcg, Oral, Daily  meloxicam (MOBIC) tablet 7.5 mg, 7.5 mg, Oral, Daily  pantoprazole (PROTONIX) tablet 40 mg, 40 mg, Oral, QAM AC  prednisoLONE acetate (PRED FORTE) 1 % ophthalmic suspension 1 drop, 1 drop, Left Eye, Daily  atorvastatin (LIPITOR) tablet 40 mg, 40 mg, Oral, Nightly  tiotropium (SPIRIVA) inhalation capsule 18 mcg, 18 mcg, Inhalation, Daily  cefTRIAXone (ROCEPHIN) 1 g in sterile water 10 mL IV syringe, 1 g, Intravenous, Q24H  doxycycline (VIBRAMYCIN) 100 mg in dextrose 5 % 100 mL IVPB, 100 mg, Intravenous, Q12H    IV MEDICATIONS:   dextrose         ALLERGIES:  No Known Allergies    REVIEW OF SYSTEMS:  General ROS:     - Positive For:     - Negative For: chills, fatigue, fever, malaise, night sweats or sleep disturbance   ENT ROS: - Positive For:     - Negative For: epistaxis, headaches, sinus pain, sneezing or sore throat   Allergy and Immunology ROS:     - Negative For: nasal congestion, postnasal drip or seasonal allergies   Hematological and Lymphatic ROS:      - Negative For: bleeding problems, bruising, fatigue, night sweats or pallor   Respiratory ROS:      - Positive For:       - Negative For: hemoptysis, pleuritic type chest pains  Cardiovascular ROS:      - Positive For:      - Negative For: chest pain, dyspnea on exertion, irregular heartbeat, loss of consciousness, murmur, orthopnea or palpitations   Gastrointestinal ROS:      - Positive For:     - Negative For: abdominal pain, appetite loss, blood in stools, change in bowel habits, change in stools, constipation, diarrhea, hematemesis, melena, nausea/vomiting or stool incontinence   Genito-Urinary ROS:      - Negative For: change in urinary stream, dysuria, hematuria or incontinence   Musculoskeletal ROS:      - Negative For: joint pain, joint stiffness, joint swelling or muscle pain   Neurological ROS:     - Negative For: behavioral changes, confusion, dizziness, headaches, impaired coordination/balance or memory loss   Dermatological ROS:      - Negative For: hair changes, lumps, mole changes, nail changes or pruritus    PHYSICAL EXAMINATION:     VITAL SIGNS:  /60   Pulse 80   Temp 98.7 °F (37.1 °C) (Temporal)   Resp 20   Ht 4' 10\" (1.473 m)   Wt 92 lb 6.4 oz (41.9 kg)   SpO2 94%   BMI 19.31 kg/m²   Wt Readings from Last 3 Encounters:   09/10/18 92 lb 6.4 oz (41.9 kg)   03/14/18 95 lb (43.1 kg)   02/23/18 95 lb (43.1 kg)     Temp Readings from Last 3 Encounters:   09/10/18 98.7 °F (37.1 °C) (Temporal)   03/16/18 97.3 °F (36.3 °C) (Temporal)   03/16/18 97.7 °F (36.5 °C)     TMAX:  BP Readings from Last 3 Encounters:   09/10/18 118/60   03/16/18 (!) 121/54   03/16/18 (!) 112/54     Pulse Readings from Last 3 Encounters:   09/10/18 80   03/16/18 76   01/11/13 92 lungs suspicious for   interstitial lung disease, interstitial pneumonia or edema. 3. Rounded opacities concerning for pulmonary nodules within the left   midlung and right lung apex. Correlation with nonemergent CT of the   chest without contrast is advised for better characterization. ALERT:  THIS IS AN ABNORMAL REPORT            CT CHEST WO CONTRAST    (Results Pending)             ASSESSMENT:  1.) Calcified Lung Nodules - reflective of granulomatous disease  2.) Calcified Mediastinal Lymphadenopathy - reflective of granulomatous disease  3.) Splenic Calcifications - reflective of granulomatous disease  4.) B/L Pleural Effusions   5.) Hyperglycemia - serum glucose 497 upon admission  6.) COPD, No Acute Exacerbation   7.) Chronic Hypoxic Resrpiatory Failure     PLAN:  1.) CT chest reviewed, no biopsy warranted  2.) procalcitonin  3.) monitor serum glucose levels  4.) if procalcitonin not elevated then D/C abx  5.) duoneb, D/C spiriva  6.) patient on baseline oxygen  7.) DVT Prophylaxis     Thank you Cassandra Brower MD very much for allowing me to see this patient in consultation and follow up. Care reviewed with nursing staff, medical and surgical specialty care, primary care and the patient's family as available. Restraints are ordered when the patient can do harm to him/herself by pulling out devices.     Geraldine Olsen M.D.

## 2018-09-10 NOTE — ED PROVIDER NOTES
Victor M Boyd MD at [address] [place] [address]     Social History:  reports that she has been smoking. She has a 59.00 pack-year smoking history. She has never used smokeless tobacco. She reports that she does not drink alcohol or use drugs. Family History: family history is not on file. Allergies: Patient has no known allergies. Physical Exam           ED Triage Vitals [09/09/18 1931]   BP Temp Temp src Pulse Resp SpO2 Height Weight   (!) 137/55 98.6 °F (37 °C) -- 82 12 100 % 4' 10\" (1.473 m) 90 lb (40.8 kg)      Oxygen Saturation Interpretation: Normal.    Constitutional:  Alert, development consistent with age. Eyes:  PERRL, EOMI, no discharge or conjunctival injection. Ears:  External ears without lesions. Throat:  Pharynx without injection, exudate, or tonsillar hypertrophy. Airway patient. Neck:  Normal ROM. Supple. Respiratory:  Respirations present bilaterally with expiratory wheezes bilaterally. CV:  Regular rate and rhythm, normal heart sounds, without pathological murmurs, ectopy, gallops, or rubs. GI:  Abdomen Soft, nontender, good bowel sounds. No firm or pulsatile mass. Back:  No costovertebral tenderness. Integument:  Normal turgor. Warm, dry, without visible rash, unless noted elsewhere. No edema or tenderness to bilateral lower extremities. Lymphatics: No lymphangitis or adenopathy noted. Neurological:  Oriented. Motor functions intact.     Lab / Imaging Results   (All laboratory and radiology results have been personally reviewed by myself)  Labs:  Results for orders placed or performed during the hospital encounter of 09/09/18   CBC Auto Differential   Result Value Ref Range    WBC 6.2 4.5 - 11.5 E9/L    RBC 3.99 3.50 - 5.50 E12/L    Hemoglobin 12.6 11.5 - 15.5 g/dL    Hematocrit 39.7 34.0 - 48.0 %    MCV 99.5 80.0 - 99.9 fL    MCH 31.6 26.0 - 35.0 pg    MCHC 31.7 (L) 32.0 - 34.5 %    RDW 12.4 11.5 - 15.0 fL    Platelets 268 507 - 799 E9/L    MPV 9.7 7.0 - 12.0 fL    Neutrophils % 69.9 43.0 - 80.0 %    Immature Granulocytes % 0.6 0.0 - 5.0 %    Lymphocytes % 19.2 (L) 20.0 - 42.0 %    Monocytes % 8.4 2.0 - 12.0 %    Eosinophils % 1.6 0.0 - 6.0 %    Basophils % 0.3 0.0 - 2.0 %    Neutrophils # 4.33 1.80 - 7.30 E9/L    Immature Granulocytes # 0.04 E9/L    Lymphocytes # 1.19 (L) 1.50 - 4.00 E9/L    Monocytes # 0.52 0.10 - 0.95 E9/L    Eosinophils # 0.10 0.05 - 0.50 E9/L    Basophils # 0.02 0.00 - 0.20 E9/L   Comprehensive Metabolic Panel   Result Value Ref Range    Sodium 134 132 - 146 mmol/L    Potassium 5.0 3.5 - 5.0 mmol/L    Chloride 93 (L) 98 - 107 mmol/L    CO2 36 (H) 22 - 29 mmol/L    Anion Gap 5 (L) 7 - 16 mmol/L    Glucose 500 (H) 74 - 109 mg/dL    BUN 16 8 - 23 mg/dL    CREATININE 0.7 0.5 - 1.0 mg/dL    GFR Non-African American >60 >=60 mL/min/1.73    GFR African American >60     Calcium 8.5 (L) 8.6 - 10.2 mg/dL    Total Protein 6.3 (L) 6.4 - 8.3 g/dL    Alb 3.1 (L) 3.5 - 5.2 g/dL    Total Bilirubin 0.3 0.0 - 1.2 mg/dL    Alkaline Phosphatase 64 35 - 104 U/L    ALT 10 0 - 32 U/L    AST 13 0 - 31 U/L   Beta-Hydroxybutyrate   Result Value Ref Range    Beta-Hydroxybutyrate 0.06 0.02 - 0.27 mmol/L   pH, venous   Result Value Ref Range    pH, [place] 7.33 7.30 - 7.42   Troponin   Result Value Ref Range    Troponin <0.01 0.00 - 0.03 ng/mL   Lactic Acid, Plasma   Result Value Ref Range    Lactic Acid 0.9 0.5 - 2.2 mmol/L   POCT glucose   Result Value Ref Range    Glucose 497 mg/dL    QC OK? Yes    POCT Glucose   Result Value Ref Range    Meter Glucose 497 (H) 70 - 110 mg/dL   POCT glucose   Result Value Ref Range    Glucose 427 mg/dL    QC OK?  Yes    POCT Glucose   Result Value Ref Range    Meter Glucose 427 (H) 70 - 110 mg/dL   EKG 12 Lead   Result Value Ref Range    Ventricular Rate 79 BPM    Atrial Rate 79 BPM    P-R Interval 144 ms    QRS Duration 78 ms    Q-T Interval 378 ms    QTc Calculation (Bazett) 433 ms    P Axis 70 degrees    R Axis 48 degrees    T Axis 59 degrees   EKG #1: Interpreted by emergency department physician unless otherwise noted. Time:  1949    Rate: 79  Rhythm: Sinus. Interpretation: nonspecific ST and T waves changes no previous available. Imaging: All Radiology results interpreted by Radiologist unless otherwise noted. XR CHEST PORTABLE   Final Result      1. Emphysematous changes suggestive of COPD in the appropriate   clinical setting. 2. Interstitial prominence throughout the lungs suspicious for   interstitial lung disease, interstitial pneumonia or edema. 3. Rounded opacities concerning for pulmonary nodules within the left   midlung and right lung apex. Correlation with nonemergent CT of the   chest without contrast is advised for better characterization. ALERT:  THIS IS AN ABNORMAL REPORT                ED Course / Medical Decision Making     Medications   doxycycline (VIBRAMYCIN) 200 mg in dextrose 5 % 250 mL IVPB (200 mg Intravenous New Bag 9/9/18 2100)   0.9 % sodium chloride bolus (0 mLs Intravenous Stopped 9/9/18 2056)   ipratropium-albuterol (DUONEB) nebulizer solution 1 ampule (1 ampule Inhalation Given 9/9/18 1954)   cefTRIAXone (ROCEPHIN) 2 g IVPB in D5W 50ml minibag (0 g Intravenous Stopped 9/9/18 2056)   insulin regular (HUMULIN R;NOVOLIN R) injection 5 Units (5 Units Intravenous Given 9/9/18 2101)        Re-Evaluations:  9/9/18      Time: 2030  Patients symptoms are improving, wheezing improved after treatments. Consultations:             IP CONSULT TO HOSPITALIST  IP CONSULT TO PULMONOLOGY    Procedures:   none    MDM:  Patient is well appearing, afebrile. VS stable. Labs obtained, reassuring, with the exception of glucose of 500. Not acidotic. Patient was given 1L of normal saline and 5 units of insulin while in ED. Chest xray obtained indicative of pneumonia or edema, patient does have a productive cough. Therefor will treat for CAP with doxycycline and rocephin. Case discussed with Dr. Jonnathan Rodriguez, agreeable to admit.     Counseling: I have spoken with the patient and discussed todays results, in addition to providing specific details for the plan of care and counseling regarding the diagnosis and prognosis and are agreeable with the plan. Assessment      1. Community acquired pneumonia, unspecified laterality    2. Hyperglycemia    3. Dehydration    4. COPD exacerbation ([facility] [address]      This patient's ED course included: a personal history and physicial examination, re-evaluation prior to disposition and multiple bedside re-evaluations  This patient has remained hemodynamically stable during their ED course. Plan   Admit to telemetry. Patient condition is good. New Medications     New Prescriptions    No medications on file     Electronically signed by TERE De Los Santos CNP   DD: 9/9/18  **This report was transcribed using voice recognition software. Every effort was made to ensure accuracy; however, inadvertent computerized transcription errors may be present.   END OF PROVIDER NOTE     TERE Shen CNP  09/09/18 2938

## 2018-09-11 ENCOUNTER — APPOINTMENT (OUTPATIENT)
Dept: CT IMAGING | Age: 83
DRG: 194 | End: 2018-09-11
Payer: MEDICARE

## 2018-09-11 VITALS
DIASTOLIC BLOOD PRESSURE: 54 MMHG | OXYGEN SATURATION: 93 % | SYSTOLIC BLOOD PRESSURE: 110 MMHG | RESPIRATION RATE: 16 BRPM | HEIGHT: 58 IN | WEIGHT: 92.4 LBS | TEMPERATURE: 98.7 F | BODY MASS INDEX: 19.39 KG/M2 | HEART RATE: 78 BPM

## 2018-09-11 LAB
ANION GAP SERPL CALCULATED.3IONS-SCNC: 9 MMOL/L (ref 7–16)
BUN BLDV-MCNC: 20 MG/DL (ref 8–23)
CALCIUM SERPL-MCNC: 8.5 MG/DL (ref 8.6–10.2)
CHLORIDE BLD-SCNC: 100 MMOL/L (ref 98–107)
CO2: 34 MMOL/L (ref 22–29)
CREAT SERPL-MCNC: 0.7 MG/DL (ref 0.5–1)
GFR AFRICAN AMERICAN: >60
GFR NON-AFRICAN AMERICAN: >60 ML/MIN/1.73
GLUCOSE BLD-MCNC: 146 MG/DL (ref 74–109)
HCT VFR BLD CALC: 37 % (ref 34–48)
HEMOGLOBIN: 11.5 G/DL (ref 11.5–15.5)
MCH RBC QN AUTO: 30.9 PG (ref 26–35)
MCHC RBC AUTO-ENTMCNC: 31.1 % (ref 32–34.5)
MCV RBC AUTO: 99.5 FL (ref 80–99.9)
METER GLUCOSE: 133 MG/DL (ref 70–110)
METER GLUCOSE: 282 MG/DL (ref 70–110)
PDW BLD-RTO: 12.7 FL (ref 11.5–15)
PLATELET # BLD: 282 E9/L (ref 130–450)
PMV BLD AUTO: 9.8 FL (ref 7–12)
POTASSIUM SERPL-SCNC: 4.7 MMOL/L (ref 3.5–5)
RBC # BLD: 3.72 E12/L (ref 3.5–5.5)
SODIUM BLD-SCNC: 143 MMOL/L (ref 132–146)
WBC # BLD: 6.4 E9/L (ref 4.5–11.5)

## 2018-09-11 PROCEDURE — 82962 GLUCOSE BLOOD TEST: CPT

## 2018-09-11 PROCEDURE — G0378 HOSPITAL OBSERVATION PER HR: HCPCS

## 2018-09-11 PROCEDURE — 36415 COLL VENOUS BLD VENIPUNCTURE: CPT

## 2018-09-11 PROCEDURE — 85027 COMPLETE CBC AUTOMATED: CPT

## 2018-09-11 PROCEDURE — 80048 BASIC METABOLIC PNL TOTAL CA: CPT

## 2018-09-11 PROCEDURE — 6360000002 HC RX W HCPCS: Performed by: FAMILY MEDICINE

## 2018-09-11 PROCEDURE — 2580000003 HC RX 258: Performed by: FAMILY MEDICINE

## 2018-09-11 PROCEDURE — 6370000000 HC RX 637 (ALT 250 FOR IP): Performed by: FAMILY MEDICINE

## 2018-09-11 PROCEDURE — 2700000000 HC OXYGEN THERAPY PER DAY

## 2018-09-11 PROCEDURE — 70450 CT HEAD/BRAIN W/O DYE: CPT

## 2018-09-11 PROCEDURE — 96372 THER/PROPH/DIAG INJ SC/IM: CPT

## 2018-09-11 RX ORDER — INSULIN GLARGINE 100 [IU]/ML
10 INJECTION, SOLUTION SUBCUTANEOUS 2 TIMES DAILY
Qty: 1 VIAL | Refills: 3 | Status: SHIPPED | OUTPATIENT
Start: 2018-09-11 | End: 2018-09-11 | Stop reason: HOSPADM

## 2018-09-11 RX ADMIN — PANTOPRAZOLE SODIUM 40 MG: 40 TABLET, DELAYED RELEASE ORAL at 06:45

## 2018-09-11 RX ADMIN — INSULIN LISPRO 6 UNITS: 100 INJECTION, SOLUTION INTRAVENOUS; SUBCUTANEOUS at 11:31

## 2018-09-11 RX ADMIN — ENOXAPARIN SODIUM 40 MG: 40 INJECTION SUBCUTANEOUS at 09:28

## 2018-09-11 RX ADMIN — PREDNISOLONE ACETATE 1 DROP: 10 SUSPENSION/ DROPS OPHTHALMIC at 09:29

## 2018-09-11 RX ADMIN — INSULIN GLARGINE 10 UNITS: 100 INJECTION, SOLUTION SUBCUTANEOUS at 09:35

## 2018-09-11 RX ADMIN — BUMETANIDE 1 MG: 1 TABLET ORAL at 09:28

## 2018-09-11 RX ADMIN — MELOXICAM 7.5 MG: 7.5 TABLET ORAL at 09:28

## 2018-09-11 RX ADMIN — LEVOTHYROXINE SODIUM 50 MCG: 50 TABLET ORAL at 06:46

## 2018-09-11 RX ADMIN — Medication 10 ML: at 09:30

## 2018-09-11 ASSESSMENT — PAIN SCALES - GENERAL
PAINLEVEL_OUTOF10: 0
PAINLEVEL_OUTOF10: 0

## 2018-09-11 ASSESSMENT — ENCOUNTER SYMPTOMS: SHORTNESS OF BREATH: 1

## 2018-09-11 NOTE — PROGRESS NOTES
CLINICAL PHARMACY: DISCHARGE MED RECONCILIATION/REVIEW    [employer] Patient?: No  Total # of Interventions Recommended: 2 -   - Discontinued Medication #: 1  - New Order #: 1   -   Total # Interventions Accepted: 2  Intervention Severity:   - Level 1 Intervention Present?: No   - Level 2 #: 1   - Level 3 #: 1   Time Spent (min): 30    Additional Documentation:
Dr. Samuel Mckay notified of patient's procalcitonin level 0.03.      Wyatt Yates
Dr. Stinson Sessions notified via perfect serve that the patient's daughter is at bedside and would like to talk to him. Rosangela Sotelo [phone].    Yecenia Garcia
Dr. Yuniel Kimbrough on floor to see patient is ok for discharge.      Yecenia Garcia
Per CT tech the CT of the chest was ordered routine and will not be read until tomorrow.
1 drop Left Eye Daily Cassandra Brower MD   1 drop at 09/10/18 0845    atorvastatin (LIPITOR) tablet 40 mg  40 mg Oral Nightly Cassandra Brower MD   40 mg at 09/10/18 2035    cefTRIAXone (ROCEPHIN) 1 g in sterile water 10 mL IV syringe  1 g Intravenous Q24H Cassandra Brower MD   1 g at 09/10/18 2038    doxycycline (VIBRAMYCIN) 100 mg in dextrose 5 % 100 mL IVPB  100 mg Intravenous Q12H Cassandra Brower MD   Stopped at 09/10/18 2300     No Known Allergies  Active Problems:    Pneumonia  Resolved Problems:    * No resolved hospital problems. *    Blood pressure (!) 110/54, pulse 78, temperature 98.7 °F (37.1 °C), temperature source Temporal, resp. rate 16, height 4' 10\" (1.473 m), weight 92 lb 6.4 oz (41.9 kg), SpO2 93 %. Subjective:  Symptoms:  She reports shortness of breath. Diet:  Poor intake. Activity level: Impaired due to weakness. Pain:  She reports no pain. Objective:  General Appearance:  Comfortable. Vital signs: (most recent): Blood pressure (!) 110/54, pulse 78, temperature 98.7 °F (37.1 °C), temperature source Temporal, resp. rate 16, height 4' 10\" (1.473 m), weight 92 lb 6.4 oz (41.9 kg), SpO2 93 %. No fever. Lungs:  Normal effort and normal respiratory rate. She is not in respiratory distress. There are wheezes. Heart: Normal rate. Regular rhythm. S1 normal and S2 normal.      Assessment:  (Pneumonia, organism unspecified  Uncontrolled DM  COPD  Hyperlipidemia  Confusion). Plan:   (Continue doxy and rocephin. Pulmonary following. Monitor labs. CT of head for confusion. Oxygen, nebs. ).        Cassandra Brower MD  9/11/2018
Patient Education:    Pharmacist Education Log:   Date  Person Educated Content of Education and   Printed Materials Provided     (Include Pharmacist Initials)                                 If applicable:   []  Unable to be complete education at this time due to: **       []  Barriers to counseling were identified: **    []  Follow-up education is required: **     []  In addition to the above, the patient was provided with the following additional        compliance tools: **     Documentation of Pharmacist Interventions and Follow-up Plan: The following Pharmacist Transition of Care Services were completed during the admission:  []  Entire home medication list was reviewed for accuracy (best possible medication        history was obtained)   []  Home medication list was updated or corrected     []  Reviewed and summarized home medication changes and new inpatient         medications    []  Patient education was provided on home medication changes  []  Patient education was provided on new inpatient medications    The following Pharmacist Transition of Care Services were completed as part of the discharge process:  [x]  Reviewed and/or assisted with discharge medication reconciliation  []  Discharge patient medication education was provided   []  Reviewed the After Visit Summary (AVS) with patient      Additional Interventions:  [x]  Inpatient prescriber was contacted and the following pharmacy recommendations        were accepted: Refill of [facility]    [] Outpatient primary care physician was informed of medication changes that were       made during admission. **    [] Population [facility] Clinical Pharmacist was contacted to arrange post-discharge       review of medications. **     [] Other - see below:  1.             Pharmacist: Petra Denver PharmD, [facility]  Date:  9/11/2018 2:11 PM
negative   3.) monitor serum glucose levels  4.) patient on baseline oxygen  5.) DVT Prophylaxis      - PPI daily in AM   - OK to D/C from pulmonary perspective     Thank you Shayna Hernandez MD very much for allowing me to see this patient in consultation and follow up.     Care reviewed with nursing staff, medical and surgical specialty care, primary care and the patient's family as available. Restraints are ordered when the patient can do harm to him/herself by pulling out devices.     Antony Mccall M.D.

## 2018-09-11 NOTE — CARE COORDINATION
Care Coordination:  Spoke to patient and family regarding discharge for today. Son in room. Home 02 on for transport. Declines [facility] services. Zeus Sierra said he is at home with her 24/7. He does not work, he is on disability and he will assist her with meds, meals, shopping, transportation etc.  They do not feel they have any home going needs.     Anu Rodriguez

## 2018-09-12 LAB
CULTURE, RESPIRATORY: NORMAL
SMEAR, RESPIRATORY: NORMAL

## 2018-09-12 NOTE — DISCHARGE SUMMARY
DISCHARGE SUMMARY    Patient was admitted for pneumonia. Patient improved with Abx, oxygen, nebs. Also some long standing confusion per family  CT of head no acute issues, just chronic changed. Pulmonary followed. Patient discharged to home in stable condition.

## 2018-09-13 LAB
EKG ATRIAL RATE: 79 BPM
EKG P AXIS: 70 DEGREES
EKG P-R INTERVAL: 144 MS
EKG Q-T INTERVAL: 378 MS
EKG QRS DURATION: 78 MS
EKG QTC CALCULATION (BAZETT): 433 MS
EKG R AXIS: 48 DEGREES
EKG T AXIS: 59 DEGREES
EKG VENTRICULAR RATE: 79 BPM

## 2018-09-14 LAB
ALPHA-1 ANTITRYPSIN PHENOTYPE: NORMAL
ALPHA-1 ANTITRYPSIN: 190 MG/DL (ref 90–200)
BLOOD CULTURE, ROUTINE: NORMAL
CULTURE, BLOOD 2: NORMAL

## 2019-10-29 ENCOUNTER — HOSPITAL ENCOUNTER (INPATIENT)
Age: 84
LOS: 7 days | Discharge: NURSING, REHAB OR LONG-TERM CARE | DRG: 177 | End: 2019-11-05
Attending: EMERGENCY MEDICINE | Admitting: FAMILY MEDICINE
Payer: MEDICARE

## 2019-10-29 ENCOUNTER — APPOINTMENT (OUTPATIENT)
Dept: GENERAL RADIOLOGY | Age: 84
DRG: 177 | End: 2019-10-29
Payer: MEDICARE

## 2019-10-29 DIAGNOSIS — J18.9 HOSPITAL-ACQUIRED PNEUMONIA: ICD-10-CM
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
## 2019-10-29 DIAGNOSIS — R09.02 HYPOXIA: Primary | ICD-10-CM

## 2019-10-29 PROBLEM — J96.01 ACUTE RESPIRATORY FAILURE WITH HYPOXIA (HCC): Status: ACTIVE | Noted: 2019-10-29

## 2019-10-29 LAB
ALBUMIN SERPL-MCNC: 3.3 G/DL (ref 3.5–5.2)
ALP BLD-CCNC: 102 U/L (ref 35–104)
ALT SERPL-CCNC: 17 U/L (ref 0–32)
ANION GAP SERPL CALCULATED.3IONS-SCNC: 4 MMOL/L (ref 7–16)
AST SERPL-CCNC: 24 U/L (ref 0–31)
BILIRUB SERPL-MCNC: 0.3 MG/DL (ref 0–1.2)
BUN BLDV-MCNC: 21 MG/DL (ref 8–23)
CALCIUM SERPL-MCNC: 9.4 MG/DL (ref 8.6–10.2)
CHLORIDE BLD-SCNC: 95 MMOL/L (ref 98–107)
CO2: 40 MMOL/L (ref 22–29)
CREAT SERPL-MCNC: 0.5 MG/DL (ref 0.5–1)
EKG ATRIAL RATE: 97 BPM
EKG P AXIS: 78 DEGREES
EKG P-R INTERVAL: 130 MS
EKG Q-T INTERVAL: 344 MS
EKG QRS DURATION: 74 MS
EKG QTC CALCULATION (BAZETT): 436 MS
EKG R AXIS: 51 DEGREES
EKG T AXIS: 70 DEGREES
EKG VENTRICULAR RATE: 97 BPM
GFR AFRICAN AMERICAN: >60
GFR NON-AFRICAN AMERICAN: >60 ML/MIN/1.73
GLUCOSE BLD-MCNC: 111 MG/DL (ref 74–99)
HCT VFR BLD CALC: 39.3 % (ref 34–48)
HEMOGLOBIN: 11.8 G/DL (ref 11.5–15.5)
INR BLD: 1
MCH RBC QN AUTO: 30.7 PG (ref 26–35)
MCHC RBC AUTO-ENTMCNC: 30 % (ref 32–34.5)
MCV RBC AUTO: 102.3 FL (ref 80–99.9)
METER GLUCOSE: 202 MG/DL (ref 74–99)
METER GLUCOSE: 230 MG/DL (ref 74–99)
METER GLUCOSE: 372 MG/DL (ref 74–99)
METER GLUCOSE: 424 MG/DL (ref 74–99)
PDW BLD-RTO: 13.6 FL (ref 11.5–15)
PLATELET # BLD: 203 E9/L (ref 130–450)
PMV BLD AUTO: 10.3 FL (ref 7–12)
POTASSIUM SERPL-SCNC: 4.7 MMOL/L (ref 3.5–5)
PRO-BNP: 472 PG/ML (ref 0–450)
PROCALCITONIN: 0.04 NG/ML (ref 0–0.08)
PROTHROMBIN TIME: 11.7 SEC (ref 9.3–12.4)
RBC # BLD: 3.84 E12/L (ref 3.5–5.5)
SODIUM BLD-SCNC: 139 MMOL/L (ref 132–146)
TOTAL PROTEIN: 6.2 G/DL (ref 6.4–8.3)
TROPONIN: <0.01 NG/ML (ref 0–0.03)
WBC # BLD: 7.5 E9/L (ref 4.5–11.5)

## 2019-10-29 PROCEDURE — 85610 PROTHROMBIN TIME: CPT

## 2019-10-29 PROCEDURE — 6360000002 HC RX W HCPCS: Performed by: FAMILY MEDICINE

## 2019-10-29 PROCEDURE — 6360000002 HC RX W HCPCS: Performed by: EMERGENCY MEDICINE

## 2019-10-29 PROCEDURE — 6370000000 HC RX 637 (ALT 250 FOR IP): Performed by: INTERNAL MEDICINE

## 2019-10-29 PROCEDURE — 83880 ASSAY OF NATRIURETIC PEPTIDE: CPT

## 2019-10-29 PROCEDURE — 6370000000 HC RX 637 (ALT 250 FOR IP): Performed by: FAMILY MEDICINE

## 2019-10-29 PROCEDURE — 71045 X-RAY EXAM CHEST 1 VIEW: CPT

## 2019-10-29 PROCEDURE — 2580000003 HC RX 258: Performed by: EMERGENCY MEDICINE

## 2019-10-29 PROCEDURE — 94640 AIRWAY INHALATION TREATMENT: CPT

## 2019-10-29 PROCEDURE — APPSS60 APP SPLIT SHARED TIME 46-60 MINUTES: Performed by: NURSE PRACTITIONER

## 2019-10-29 PROCEDURE — 87040 BLOOD CULTURE FOR BACTERIA: CPT

## 2019-10-29 PROCEDURE — 84145 PROCALCITONIN (PCT): CPT

## 2019-10-29 PROCEDURE — 99223 1ST HOSP IP/OBS HIGH 75: CPT | Performed by: INTERNAL MEDICINE

## 2019-10-29 PROCEDURE — 85027 COMPLETE CBC AUTOMATED: CPT

## 2019-10-29 PROCEDURE — 93010 ELECTROCARDIOGRAM REPORT: CPT | Performed by: INTERNAL MEDICINE

## 2019-10-29 PROCEDURE — 80053 COMPREHEN METABOLIC PANEL: CPT

## 2019-10-29 PROCEDURE — 84484 ASSAY OF TROPONIN QUANT: CPT

## 2019-10-29 PROCEDURE — 2060000000 HC ICU INTERMEDIATE R&B

## 2019-10-29 PROCEDURE — 82962 GLUCOSE BLOOD TEST: CPT

## 2019-10-29 PROCEDURE — 93005 ELECTROCARDIOGRAM TRACING: CPT | Performed by: EMERGENCY MEDICINE

## 2019-10-29 PROCEDURE — 99285 EMERGENCY DEPT VISIT HI MDM: CPT

## 2019-10-29 PROCEDURE — 6370000000 HC RX 637 (ALT 250 FOR IP): Performed by: EMERGENCY MEDICINE

## 2019-10-29 PROCEDURE — 36415 COLL VENOUS BLD VENIPUNCTURE: CPT

## 2019-10-29 RX ORDER — BUMETANIDE 1 MG/1
1 TABLET ORAL DAILY
Status: DISCONTINUED | OUTPATIENT
Start: 2019-10-29 | End: 2019-11-05 | Stop reason: HOSPADM

## 2019-10-29 RX ORDER — PANTOPRAZOLE SODIUM 40 MG/1
40 TABLET, DELAYED RELEASE ORAL
Status: DISCONTINUED | OUTPATIENT
Start: 2019-10-30 | End: 2019-11-05 | Stop reason: HOSPADM

## 2019-10-29 RX ORDER — DEXTROSE MONOHYDRATE 50 MG/ML
100 INJECTION, SOLUTION INTRAVENOUS PRN
Status: DISCONTINUED | OUTPATIENT
Start: 2019-10-29 | End: 2019-11-05 | Stop reason: HOSPADM

## 2019-10-29 RX ORDER — ROSUVASTATIN CALCIUM 10 MG/1
10 TABLET, COATED ORAL DAILY
Status: DISCONTINUED | OUTPATIENT
Start: 2019-10-29 | End: 2019-11-05 | Stop reason: HOSPADM

## 2019-10-29 RX ORDER — INSULIN GLARGINE 100 [IU]/ML
10 INJECTION, SOLUTION SUBCUTANEOUS 2 TIMES DAILY
Status: DISCONTINUED | OUTPATIENT
Start: 2019-10-29 | End: 2019-11-05 | Stop reason: HOSPADM

## 2019-10-29 RX ORDER — ACETAMINOPHEN 500 MG
500 TABLET ORAL EVERY 6 HOURS PRN
Status: DISCONTINUED | OUTPATIENT
Start: 2019-10-29 | End: 2019-11-05 | Stop reason: HOSPADM

## 2019-10-29 RX ORDER — LEVOTHYROXINE SODIUM 0.05 MG/1
50 TABLET ORAL DAILY
Status: DISCONTINUED | OUTPATIENT
Start: 2019-10-29 | End: 2019-11-05 | Stop reason: HOSPADM

## 2019-10-29 RX ORDER — ONDANSETRON 4 MG/1
4 TABLET, FILM COATED ORAL EVERY 12 HOURS PRN
Status: ON HOLD | COMMUNITY
End: 2019-11-08 | Stop reason: HOSPADM

## 2019-10-29 RX ORDER — ACETAZOLAMIDE 250 MG/1
500 TABLET ORAL 2 TIMES DAILY
Status: COMPLETED | OUTPATIENT
Start: 2019-10-29 | End: 2019-10-31

## 2019-10-29 RX ORDER — MELOXICAM 7.5 MG/1
7.5 TABLET ORAL DAILY
Status: DISCONTINUED | OUTPATIENT
Start: 2019-10-29 | End: 2019-11-05 | Stop reason: HOSPADM

## 2019-10-29 RX ORDER — PREDNISOLONE ACETATE 10 MG/ML
1 SUSPENSION/ DROPS OPHTHALMIC DAILY
Status: DISCONTINUED | OUTPATIENT
Start: 2019-10-29 | End: 2019-11-05 | Stop reason: HOSPADM

## 2019-10-29 RX ORDER — NICOTINE POLACRILEX 4 MG
15 LOZENGE BUCCAL PRN
Status: DISCONTINUED | OUTPATIENT
Start: 2019-10-29 | End: 2019-11-05 | Stop reason: HOSPADM

## 2019-10-29 RX ORDER — IPRATROPIUM BROMIDE AND ALBUTEROL SULFATE 2.5; .5 MG/3ML; MG/3ML
1 SOLUTION RESPIRATORY (INHALATION)
Status: COMPLETED | OUTPATIENT
Start: 2019-10-29 | End: 2019-10-29

## 2019-10-29 RX ORDER — CALCITONIN SALMON 200 [IU]/.09ML
1 SPRAY, METERED NASAL DAILY
Status: DISCONTINUED | OUTPATIENT
Start: 2019-10-29 | End: 2019-10-29 | Stop reason: CLARIF

## 2019-10-29 RX ORDER — ONDANSETRON 4 MG/1
4 TABLET, ORALLY DISINTEGRATING ORAL EVERY 8 HOURS PRN
Status: ON HOLD | COMMUNITY
End: 2019-10-29

## 2019-10-29 RX ORDER — DEXTROSE MONOHYDRATE 25 G/50ML
12.5 INJECTION, SOLUTION INTRAVENOUS PRN
Status: DISCONTINUED | OUTPATIENT
Start: 2019-10-29 | End: 2019-11-05 | Stop reason: HOSPADM

## 2019-10-29 RX ADMIN — BUMETANIDE 1 MG: 1 TABLET ORAL at 15:18

## 2019-10-29 RX ADMIN — MELOXICAM 7.5 MG: 7.5 TABLET ORAL at 15:18

## 2019-10-29 RX ADMIN — IPRATROPIUM BROMIDE AND ALBUTEROL SULFATE 1 AMPULE: .5; 3 SOLUTION RESPIRATORY (INHALATION) at 05:33

## 2019-10-29 RX ADMIN — VANCOMYCIN HYDROCHLORIDE 1000 MG: 1 INJECTION, POWDER, LYOPHILIZED, FOR SOLUTION INTRAVENOUS at 07:07

## 2019-10-29 RX ADMIN — INSULIN LISPRO 5 UNITS: 100 INJECTION, SOLUTION INTRAVENOUS; SUBCUTANEOUS at 16:05

## 2019-10-29 RX ADMIN — INSULIN GLARGINE 10 UNITS: 100 INJECTION, SOLUTION SUBCUTANEOUS at 22:30

## 2019-10-29 RX ADMIN — ACETAZOLAMIDE 500 MG: 250 TABLET ORAL at 22:29

## 2019-10-29 RX ADMIN — TIOTROPIUM BROMIDE 18 MCG: 18 CAPSULE ORAL; RESPIRATORY (INHALATION) at 15:18

## 2019-10-29 RX ADMIN — LEVOTHYROXINE SODIUM 50 MCG: 50 TABLET ORAL at 15:17

## 2019-10-29 RX ADMIN — INSULIN LISPRO 6 UNITS: 100 INJECTION, SOLUTION INTRAVENOUS; SUBCUTANEOUS at 13:37

## 2019-10-29 RX ADMIN — INSULIN LISPRO 1 UNITS: 100 INJECTION, SOLUTION INTRAVENOUS; SUBCUTANEOUS at 22:33

## 2019-10-29 RX ADMIN — PREDNISOLONE ACETATE 1 DROP: 10 SUSPENSION/ DROPS OPHTHALMIC at 15:18

## 2019-10-29 RX ADMIN — IPRATROPIUM BROMIDE AND ALBUTEROL SULFATE 1 AMPULE: .5; 3 SOLUTION RESPIRATORY (INHALATION) at 05:34

## 2019-10-29 RX ADMIN — ROSUVASTATIN CALCIUM 10 MG: 10 TABLET, FILM COATED ORAL at 15:18

## 2019-10-29 RX ADMIN — IPRATROPIUM BROMIDE AND ALBUTEROL SULFATE 1 AMPULE: .5; 3 SOLUTION RESPIRATORY (INHALATION) at 05:32

## 2019-10-29 RX ADMIN — CEFEPIME HYDROCHLORIDE 2 G: 2 INJECTION, POWDER, FOR SOLUTION INTRAVENOUS at 06:28

## 2019-10-29 RX ADMIN — ENOXAPARIN SODIUM 40 MG: 40 INJECTION SUBCUTANEOUS at 13:37

## 2019-10-29 ASSESSMENT — PAIN SCALES - GENERAL
PAINLEVEL_OUTOF10: 0
PAINLEVEL_OUTOF10: 0
PAINLEVEL_OUTOF10: 6
PAINLEVEL_OUTOF10: 0

## 2019-10-30 LAB
ANION GAP SERPL CALCULATED.3IONS-SCNC: 10 MMOL/L (ref 7–16)
BUN BLDV-MCNC: 19 MG/DL (ref 8–23)
CALCIUM SERPL-MCNC: 9.5 MG/DL (ref 8.6–10.2)
CHLORIDE BLD-SCNC: 98 MMOL/L (ref 98–107)
CO2: 36 MMOL/L (ref 22–29)
CREAT SERPL-MCNC: 0.5 MG/DL (ref 0.5–1)
GFR AFRICAN AMERICAN: >60
GFR NON-AFRICAN AMERICAN: >60 ML/MIN/1.73
GLUCOSE BLD-MCNC: 26 MG/DL (ref 74–99)
HCT VFR BLD CALC: 41.6 % (ref 34–48)
HEMOGLOBIN: 12.3 G/DL (ref 11.5–15.5)
MCH RBC QN AUTO: 30.4 PG (ref 26–35)
MCHC RBC AUTO-ENTMCNC: 29.6 % (ref 32–34.5)
MCV RBC AUTO: 103 FL (ref 80–99.9)
METER GLUCOSE: 114 MG/DL (ref 74–99)
METER GLUCOSE: 121 MG/DL (ref 74–99)
METER GLUCOSE: 125 MG/DL (ref 74–99)
METER GLUCOSE: 158 MG/DL (ref 74–99)
METER GLUCOSE: 201 MG/DL (ref 74–99)
METER GLUCOSE: 40 MG/DL (ref 74–99)
METER GLUCOSE: <40 MG/DL (ref 74–99)
PDW BLD-RTO: 13.6 FL (ref 11.5–15)
PLATELET # BLD: 184 E9/L (ref 130–450)
PMV BLD AUTO: 10.8 FL (ref 7–12)
POTASSIUM SERPL-SCNC: 3.5 MMOL/L (ref 3.5–5)
RBC # BLD: 4.04 E12/L (ref 3.5–5.5)
SODIUM BLD-SCNC: 144 MMOL/L (ref 132–146)
WBC # BLD: 5.5 E9/L (ref 4.5–11.5)

## 2019-10-30 PROCEDURE — 82962 GLUCOSE BLOOD TEST: CPT

## 2019-10-30 PROCEDURE — 85027 COMPLETE CBC AUTOMATED: CPT

## 2019-10-30 PROCEDURE — 97530 THERAPEUTIC ACTIVITIES: CPT

## 2019-10-30 PROCEDURE — 6370000000 HC RX 637 (ALT 250 FOR IP): Performed by: INTERNAL MEDICINE

## 2019-10-30 PROCEDURE — 97166 OT EVAL MOD COMPLEX 45 MIN: CPT

## 2019-10-30 PROCEDURE — 6360000002 HC RX W HCPCS: Performed by: FAMILY MEDICINE

## 2019-10-30 PROCEDURE — 6370000000 HC RX 637 (ALT 250 FOR IP): Performed by: FAMILY MEDICINE

## 2019-10-30 PROCEDURE — 36415 COLL VENOUS BLD VENIPUNCTURE: CPT

## 2019-10-30 PROCEDURE — 2060000000 HC ICU INTERMEDIATE R&B

## 2019-10-30 PROCEDURE — 2700000000 HC OXYGEN THERAPY PER DAY

## 2019-10-30 PROCEDURE — 2580000003 HC RX 258: Performed by: FAMILY MEDICINE

## 2019-10-30 PROCEDURE — 97535 SELF CARE MNGMENT TRAINING: CPT

## 2019-10-30 PROCEDURE — 80048 BASIC METABOLIC PNL TOTAL CA: CPT

## 2019-10-30 PROCEDURE — 97161 PT EVAL LOW COMPLEX 20 MIN: CPT

## 2019-10-30 RX ADMIN — POTASSIUM BICARBONATE 20 MEQ: 782 TABLET, EFFERVESCENT ORAL at 14:38

## 2019-10-30 RX ADMIN — DEXTROSE MONOHYDRATE 12.5 G: 500 INJECTION PARENTERAL at 06:51

## 2019-10-30 RX ADMIN — ACETAZOLAMIDE 500 MG: 250 TABLET ORAL at 23:25

## 2019-10-30 RX ADMIN — INSULIN LISPRO 1 UNITS: 100 INJECTION, SOLUTION INTRAVENOUS; SUBCUTANEOUS at 23:28

## 2019-10-30 RX ADMIN — TIOTROPIUM BROMIDE 18 MCG: 18 CAPSULE ORAL; RESPIRATORY (INHALATION) at 10:39

## 2019-10-30 RX ADMIN — LEVOTHYROXINE SODIUM 50 MCG: 50 TABLET ORAL at 06:37

## 2019-10-30 RX ADMIN — PANTOPRAZOLE SODIUM 40 MG: 40 TABLET, DELAYED RELEASE ORAL at 06:38

## 2019-10-30 RX ADMIN — ENOXAPARIN SODIUM 40 MG: 40 INJECTION SUBCUTANEOUS at 10:29

## 2019-10-30 RX ADMIN — ACETAZOLAMIDE 500 MG: 250 TABLET ORAL at 10:30

## 2019-10-30 RX ADMIN — MELOXICAM 7.5 MG: 7.5 TABLET ORAL at 10:30

## 2019-10-30 RX ADMIN — BUMETANIDE 1 MG: 1 TABLET ORAL at 10:30

## 2019-10-30 RX ADMIN — PREDNISOLONE ACETATE 1 DROP: 10 SUSPENSION/ DROPS OPHTHALMIC at 10:30

## 2019-10-30 RX ADMIN — DEXTROSE MONOHYDRATE 12.5 G: 500 INJECTION PARENTERAL at 16:13

## 2019-10-30 RX ADMIN — ROSUVASTATIN CALCIUM 10 MG: 10 TABLET, FILM COATED ORAL at 10:30

## 2019-10-30 RX ADMIN — INSULIN GLARGINE 10 UNITS: 100 INJECTION, SOLUTION SUBCUTANEOUS at 10:30

## 2019-10-30 ASSESSMENT — PAIN SCALES - GENERAL
PAINLEVEL_OUTOF10: 0
PAINLEVEL_OUTOF10: 0
PAINLEVEL_OUTOF10: 6
PAINLEVEL_OUTOF10: 0
PAINLEVEL_OUTOF10: 0

## 2019-10-31 LAB
ANION GAP SERPL CALCULATED.3IONS-SCNC: 13 MMOL/L (ref 7–16)
BUN BLDV-MCNC: 18 MG/DL (ref 8–23)
CALCIUM SERPL-MCNC: 9.2 MG/DL (ref 8.6–10.2)
CHLORIDE BLD-SCNC: 97 MMOL/L (ref 98–107)
CO2: 30 MMOL/L (ref 22–29)
CREAT SERPL-MCNC: 0.5 MG/DL (ref 0.5–1)
GFR AFRICAN AMERICAN: >60
GFR NON-AFRICAN AMERICAN: >60 ML/MIN/1.73
GLUCOSE BLD-MCNC: 99 MG/DL (ref 74–99)
HCT VFR BLD CALC: 41.8 % (ref 34–48)
HEMOGLOBIN: 12.6 G/DL (ref 11.5–15.5)
MCH RBC QN AUTO: 30.7 PG (ref 26–35)
MCHC RBC AUTO-ENTMCNC: 30.1 % (ref 32–34.5)
MCV RBC AUTO: 101.7 FL (ref 80–99.9)
METER GLUCOSE: 100 MG/DL (ref 74–99)
METER GLUCOSE: 114 MG/DL (ref 74–99)
METER GLUCOSE: 143 MG/DL (ref 74–99)
METER GLUCOSE: 254 MG/DL (ref 74–99)
PDW BLD-RTO: 13.5 FL (ref 11.5–15)
PLATELET # BLD: 215 E9/L (ref 130–450)
PMV BLD AUTO: 11.4 FL (ref 7–12)
POTASSIUM SERPL-SCNC: 3.9 MMOL/L (ref 3.5–5)
RBC # BLD: 4.11 E12/L (ref 3.5–5.5)
SODIUM BLD-SCNC: 140 MMOL/L (ref 132–146)
WBC # BLD: 6.8 E9/L (ref 4.5–11.5)

## 2019-10-31 PROCEDURE — 85027 COMPLETE CBC AUTOMATED: CPT

## 2019-10-31 PROCEDURE — 80048 BASIC METABOLIC PNL TOTAL CA: CPT

## 2019-10-31 PROCEDURE — 6370000000 HC RX 637 (ALT 250 FOR IP): Performed by: INTERNAL MEDICINE

## 2019-10-31 PROCEDURE — 82962 GLUCOSE BLOOD TEST: CPT

## 2019-10-31 PROCEDURE — 2700000000 HC OXYGEN THERAPY PER DAY

## 2019-10-31 PROCEDURE — 36415 COLL VENOUS BLD VENIPUNCTURE: CPT

## 2019-10-31 PROCEDURE — 2060000000 HC ICU INTERMEDIATE R&B

## 2019-10-31 PROCEDURE — 6360000002 HC RX W HCPCS: Performed by: FAMILY MEDICINE

## 2019-10-31 PROCEDURE — 6370000000 HC RX 637 (ALT 250 FOR IP): Performed by: FAMILY MEDICINE

## 2019-10-31 RX ADMIN — ENOXAPARIN SODIUM 40 MG: 40 INJECTION SUBCUTANEOUS at 09:07

## 2019-10-31 RX ADMIN — ACETAMINOPHEN 500 MG: 500 TABLET ORAL at 22:40

## 2019-10-31 RX ADMIN — BUMETANIDE 1 MG: 1 TABLET ORAL at 09:07

## 2019-10-31 RX ADMIN — INSULIN LISPRO 1 UNITS: 100 INJECTION, SOLUTION INTRAVENOUS; SUBCUTANEOUS at 12:53

## 2019-10-31 RX ADMIN — INSULIN LISPRO 2 UNITS: 100 INJECTION, SOLUTION INTRAVENOUS; SUBCUTANEOUS at 23:02

## 2019-10-31 RX ADMIN — MELOXICAM 7.5 MG: 7.5 TABLET ORAL at 09:07

## 2019-10-31 RX ADMIN — INSULIN GLARGINE 10 UNITS: 100 INJECTION, SOLUTION SUBCUTANEOUS at 23:06

## 2019-10-31 RX ADMIN — LEVOTHYROXINE SODIUM 50 MCG: 50 TABLET ORAL at 07:04

## 2019-10-31 RX ADMIN — TIOTROPIUM BROMIDE 18 MCG: 18 CAPSULE ORAL; RESPIRATORY (INHALATION) at 09:07

## 2019-10-31 RX ADMIN — ACETAZOLAMIDE 500 MG: 250 TABLET ORAL at 09:07

## 2019-10-31 RX ADMIN — POTASSIUM BICARBONATE 40 MEQ: 782 TABLET, EFFERVESCENT ORAL at 17:22

## 2019-10-31 RX ADMIN — INSULIN GLARGINE 10 UNITS: 100 INJECTION, SOLUTION SUBCUTANEOUS at 09:26

## 2019-10-31 RX ADMIN — ROSUVASTATIN CALCIUM 10 MG: 10 TABLET, FILM COATED ORAL at 09:29

## 2019-10-31 RX ADMIN — PANTOPRAZOLE SODIUM 40 MG: 40 TABLET, DELAYED RELEASE ORAL at 07:04

## 2019-10-31 RX ADMIN — PREDNISOLONE ACETATE 1 DROP: 10 SUSPENSION/ DROPS OPHTHALMIC at 09:07

## 2019-10-31 ASSESSMENT — PAIN SCALES - GENERAL
PAINLEVEL_OUTOF10: 0
PAINLEVEL_OUTOF10: 3
PAINLEVEL_OUTOF10: 0

## 2019-11-01 ENCOUNTER — APPOINTMENT (OUTPATIENT)
Dept: GENERAL RADIOLOGY | Age: 84
DRG: 177 | End: 2019-11-01
Payer: MEDICARE

## 2019-11-01 LAB
ANION GAP SERPL CALCULATED.3IONS-SCNC: 10 MMOL/L (ref 7–16)
BUN BLDV-MCNC: 25 MG/DL (ref 8–23)
CALCIUM SERPL-MCNC: 9.3 MG/DL (ref 8.6–10.2)
CHLORIDE BLD-SCNC: 98 MMOL/L (ref 98–107)
CO2: 32 MMOL/L (ref 22–29)
CREAT SERPL-MCNC: 0.6 MG/DL (ref 0.5–1)
GFR AFRICAN AMERICAN: >60
GFR NON-AFRICAN AMERICAN: >60 ML/MIN/1.73
GLUCOSE BLD-MCNC: 45 MG/DL (ref 74–99)
HCT VFR BLD CALC: 42.9 % (ref 34–48)
HEMOGLOBIN: 13.2 G/DL (ref 11.5–15.5)
MCH RBC QN AUTO: 30.8 PG (ref 26–35)
MCHC RBC AUTO-ENTMCNC: 30.8 % (ref 32–34.5)
MCV RBC AUTO: 100 FL (ref 80–99.9)
METER GLUCOSE: 100 MG/DL (ref 74–99)
METER GLUCOSE: 102 MG/DL (ref 74–99)
METER GLUCOSE: 67 MG/DL (ref 74–99)
METER GLUCOSE: 74 MG/DL (ref 74–99)
METER GLUCOSE: 84 MG/DL (ref 74–99)
PDW BLD-RTO: 13.5 FL (ref 11.5–15)
PLATELET # BLD: 205 E9/L (ref 130–450)
PMV BLD AUTO: 11.1 FL (ref 7–12)
POTASSIUM SERPL-SCNC: 4.3 MMOL/L (ref 3.5–5)
RBC # BLD: 4.29 E12/L (ref 3.5–5.5)
SODIUM BLD-SCNC: 140 MMOL/L (ref 132–146)
WBC # BLD: 6 E9/L (ref 4.5–11.5)

## 2019-11-01 PROCEDURE — 2500000003 HC RX 250 WO HCPCS: Performed by: FAMILY MEDICINE

## 2019-11-01 PROCEDURE — 80048 BASIC METABOLIC PNL TOTAL CA: CPT

## 2019-11-01 PROCEDURE — 92611 MOTION FLUOROSCOPY/SWALLOW: CPT

## 2019-11-01 PROCEDURE — 82962 GLUCOSE BLOOD TEST: CPT

## 2019-11-01 PROCEDURE — 2060000000 HC ICU INTERMEDIATE R&B

## 2019-11-01 PROCEDURE — 74230 X-RAY XM SWLNG FUNCJ C+: CPT

## 2019-11-01 PROCEDURE — 85027 COMPLETE CBC AUTOMATED: CPT

## 2019-11-01 PROCEDURE — 2580000003 HC RX 258: Performed by: FAMILY MEDICINE

## 2019-11-01 PROCEDURE — 36415 COLL VENOUS BLD VENIPUNCTURE: CPT

## 2019-11-01 PROCEDURE — 2700000000 HC OXYGEN THERAPY PER DAY

## 2019-11-01 PROCEDURE — 6370000000 HC RX 637 (ALT 250 FOR IP): Performed by: FAMILY MEDICINE

## 2019-11-01 PROCEDURE — 6360000002 HC RX W HCPCS: Performed by: FAMILY MEDICINE

## 2019-11-01 RX ORDER — SODIUM CHLORIDE 9 MG/ML
INJECTION, SOLUTION INTRAVENOUS CONTINUOUS
Status: DISCONTINUED | OUTPATIENT
Start: 2019-11-01 | End: 2019-11-05 | Stop reason: HOSPADM

## 2019-11-01 RX ADMIN — ENOXAPARIN SODIUM 40 MG: 40 INJECTION SUBCUTANEOUS at 11:13

## 2019-11-01 RX ADMIN — ROSUVASTATIN CALCIUM 10 MG: 10 TABLET, FILM COATED ORAL at 11:13

## 2019-11-01 RX ADMIN — BARIUM SULFATE 15 G: 0.6 CREAM ORAL at 14:28

## 2019-11-01 RX ADMIN — PANTOPRAZOLE SODIUM 40 MG: 40 TABLET, DELAYED RELEASE ORAL at 06:40

## 2019-11-01 RX ADMIN — PREDNISOLONE ACETATE 1 DROP: 10 SUSPENSION/ DROPS OPHTHALMIC at 11:14

## 2019-11-01 RX ADMIN — INSULIN GLARGINE 10 UNITS: 100 INJECTION, SOLUTION SUBCUTANEOUS at 11:15

## 2019-11-01 RX ADMIN — MELOXICAM 7.5 MG: 7.5 TABLET ORAL at 11:13

## 2019-11-01 RX ADMIN — SODIUM CHLORIDE: 9 INJECTION, SOLUTION INTRAVENOUS at 17:51

## 2019-11-01 RX ADMIN — LEVOTHYROXINE SODIUM 50 MCG: 50 TABLET ORAL at 06:40

## 2019-11-01 RX ADMIN — BARIUM SULFATE 15 ML: 400 SUSPENSION ORAL at 14:28

## 2019-11-01 RX ADMIN — BUMETANIDE 1 MG: 1 TABLET ORAL at 11:13

## 2019-11-01 RX ADMIN — TIOTROPIUM BROMIDE 18 MCG: 18 CAPSULE ORAL; RESPIRATORY (INHALATION) at 11:14

## 2019-11-01 ASSESSMENT — PAIN SCALES - GENERAL
PAINLEVEL_OUTOF10: 0

## 2019-11-02 LAB
ANION GAP SERPL CALCULATED.3IONS-SCNC: 9 MMOL/L (ref 7–16)
BUN BLDV-MCNC: 26 MG/DL (ref 8–23)
CALCIUM SERPL-MCNC: 8.8 MG/DL (ref 8.6–10.2)
CHLORIDE BLD-SCNC: 100 MMOL/L (ref 98–107)
CO2: 31 MMOL/L (ref 22–29)
CREAT SERPL-MCNC: 0.5 MG/DL (ref 0.5–1)
GFR AFRICAN AMERICAN: >60
GFR NON-AFRICAN AMERICAN: >60 ML/MIN/1.73
GLUCOSE BLD-MCNC: 69 MG/DL (ref 74–99)
HCT VFR BLD CALC: 40.4 % (ref 34–48)
HEMOGLOBIN: 12.2 G/DL (ref 11.5–15.5)
MCH RBC QN AUTO: 30.7 PG (ref 26–35)
MCHC RBC AUTO-ENTMCNC: 30.2 % (ref 32–34.5)
MCV RBC AUTO: 101.8 FL (ref 80–99.9)
METER GLUCOSE: 117 MG/DL (ref 74–99)
METER GLUCOSE: 133 MG/DL (ref 74–99)
METER GLUCOSE: 41 MG/DL (ref 74–99)
METER GLUCOSE: 50 MG/DL (ref 74–99)
METER GLUCOSE: 66 MG/DL (ref 74–99)
METER GLUCOSE: 96 MG/DL (ref 74–99)
PDW BLD-RTO: 13.4 FL (ref 11.5–15)
PLATELET # BLD: 205 E9/L (ref 130–450)
PMV BLD AUTO: 10.8 FL (ref 7–12)
POTASSIUM SERPL-SCNC: 4 MMOL/L (ref 3.5–5)
RBC # BLD: 3.97 E12/L (ref 3.5–5.5)
SODIUM BLD-SCNC: 140 MMOL/L (ref 132–146)
WBC # BLD: 6.3 E9/L (ref 4.5–11.5)

## 2019-11-02 PROCEDURE — 85027 COMPLETE CBC AUTOMATED: CPT

## 2019-11-02 PROCEDURE — 2700000000 HC OXYGEN THERAPY PER DAY

## 2019-11-02 PROCEDURE — 2580000003 HC RX 258: Performed by: FAMILY MEDICINE

## 2019-11-02 PROCEDURE — 1200000000 HC SEMI PRIVATE

## 2019-11-02 PROCEDURE — 80048 BASIC METABOLIC PNL TOTAL CA: CPT

## 2019-11-02 PROCEDURE — 82962 GLUCOSE BLOOD TEST: CPT

## 2019-11-02 PROCEDURE — 36415 COLL VENOUS BLD VENIPUNCTURE: CPT

## 2019-11-02 PROCEDURE — 6360000002 HC RX W HCPCS: Performed by: FAMILY MEDICINE

## 2019-11-02 RX ADMIN — DEXTROSE MONOHYDRATE 100 ML/HR: 50 INJECTION, SOLUTION INTRAVENOUS at 17:21

## 2019-11-02 RX ADMIN — DEXTROSE MONOHYDRATE 12.5 G: 500 INJECTION PARENTERAL at 17:15

## 2019-11-02 RX ADMIN — ENOXAPARIN SODIUM 40 MG: 40 INJECTION SUBCUTANEOUS at 08:31

## 2019-11-02 RX ADMIN — DEXTROSE MONOHYDRATE 100 ML/HR: 50 INJECTION, SOLUTION INTRAVENOUS at 06:02

## 2019-11-02 RX ADMIN — PREDNISOLONE ACETATE 1 DROP: 10 SUSPENSION/ DROPS OPHTHALMIC at 08:31

## 2019-11-02 RX ADMIN — TIOTROPIUM BROMIDE 18 MCG: 18 CAPSULE ORAL; RESPIRATORY (INHALATION) at 08:31

## 2019-11-02 RX ADMIN — SODIUM CHLORIDE: 9 INJECTION, SOLUTION INTRAVENOUS at 04:55

## 2019-11-02 ASSESSMENT — PAIN SCALES - GENERAL
PAINLEVEL_OUTOF10: 8
PAINLEVEL_OUTOF10: 0

## 2019-11-02 ASSESSMENT — PAIN DESCRIPTION - LOCATION: LOCATION: LEG

## 2019-11-02 ASSESSMENT — PAIN DESCRIPTION - PAIN TYPE: TYPE: ACUTE PAIN

## 2019-11-03 LAB
ANION GAP SERPL CALCULATED.3IONS-SCNC: 15 MMOL/L (ref 7–16)
BLOOD CULTURE, ROUTINE: NORMAL
BUN BLDV-MCNC: 21 MG/DL (ref 8–23)
CALCIUM SERPL-MCNC: 8.7 MG/DL (ref 8.6–10.2)
CHLORIDE BLD-SCNC: 103 MMOL/L (ref 98–107)
CO2: 19 MMOL/L (ref 22–29)
CREAT SERPL-MCNC: 0.4 MG/DL (ref 0.5–1)
CULTURE, BLOOD 2: NORMAL
GFR AFRICAN AMERICAN: >60
GFR NON-AFRICAN AMERICAN: >60 ML/MIN/1.73
GLUCOSE BLD-MCNC: 176 MG/DL (ref 74–99)
HCT VFR BLD CALC: 38.2 % (ref 34–48)
HEMOGLOBIN: 11.6 G/DL (ref 11.5–15.5)
MCH RBC QN AUTO: 30.9 PG (ref 26–35)
MCHC RBC AUTO-ENTMCNC: 30.4 % (ref 32–34.5)
MCV RBC AUTO: 101.9 FL (ref 80–99.9)
METER GLUCOSE: 151 MG/DL (ref 74–99)
METER GLUCOSE: 189 MG/DL (ref 74–99)
METER GLUCOSE: 215 MG/DL (ref 74–99)
METER GLUCOSE: 322 MG/DL (ref 74–99)
PDW BLD-RTO: 13.2 FL (ref 11.5–15)
PLATELET # BLD: 180 E9/L (ref 130–450)
PMV BLD AUTO: 10.7 FL (ref 7–12)
POTASSIUM SERPL-SCNC: 4.4 MMOL/L (ref 3.5–5)
RBC # BLD: 3.75 E12/L (ref 3.5–5.5)
SODIUM BLD-SCNC: 137 MMOL/L (ref 132–146)
WBC # BLD: 6.1 E9/L (ref 4.5–11.5)

## 2019-11-03 PROCEDURE — 82962 GLUCOSE BLOOD TEST: CPT

## 2019-11-03 PROCEDURE — 80048 BASIC METABOLIC PNL TOTAL CA: CPT

## 2019-11-03 PROCEDURE — 85027 COMPLETE CBC AUTOMATED: CPT

## 2019-11-03 PROCEDURE — 2700000000 HC OXYGEN THERAPY PER DAY

## 2019-11-03 PROCEDURE — 6370000000 HC RX 637 (ALT 250 FOR IP): Performed by: FAMILY MEDICINE

## 2019-11-03 PROCEDURE — 6360000002 HC RX W HCPCS: Performed by: FAMILY MEDICINE

## 2019-11-03 PROCEDURE — 36415 COLL VENOUS BLD VENIPUNCTURE: CPT

## 2019-11-03 PROCEDURE — 1200000000 HC SEMI PRIVATE

## 2019-11-03 RX ORDER — ONDANSETRON 2 MG/ML
4 INJECTION INTRAMUSCULAR; INTRAVENOUS EVERY 6 HOURS PRN
Status: DISCONTINUED | OUTPATIENT
Start: 2019-11-03 | End: 2019-11-05 | Stop reason: HOSPADM

## 2019-11-03 RX ORDER — MORPHINE SULFATE 2 MG/ML
2 INJECTION, SOLUTION INTRAMUSCULAR; INTRAVENOUS EVERY 4 HOURS PRN
Status: DISCONTINUED | OUTPATIENT
Start: 2019-11-03 | End: 2019-11-05 | Stop reason: HOSPADM

## 2019-11-03 RX ADMIN — TIOTROPIUM BROMIDE 18 MCG: 18 CAPSULE ORAL; RESPIRATORY (INHALATION) at 09:47

## 2019-11-03 RX ADMIN — INSULIN GLARGINE 10 UNITS: 100 INJECTION, SOLUTION SUBCUTANEOUS at 22:02

## 2019-11-03 RX ADMIN — ONDANSETRON 4 MG: 2 INJECTION INTRAMUSCULAR; INTRAVENOUS at 21:55

## 2019-11-03 RX ADMIN — ENOXAPARIN SODIUM 40 MG: 40 INJECTION SUBCUTANEOUS at 09:49

## 2019-11-03 RX ADMIN — INSULIN LISPRO 2 UNITS: 100 INJECTION, SOLUTION INTRAVENOUS; SUBCUTANEOUS at 22:02

## 2019-11-03 RX ADMIN — PREDNISOLONE ACETATE 1 DROP: 10 SUSPENSION/ DROPS OPHTHALMIC at 13:04

## 2019-11-04 LAB
ANION GAP SERPL CALCULATED.3IONS-SCNC: 8 MMOL/L (ref 7–16)
BUN BLDV-MCNC: 22 MG/DL (ref 8–23)
CALCIUM SERPL-MCNC: 9.4 MG/DL (ref 8.6–10.2)
CHLORIDE BLD-SCNC: 109 MMOL/L (ref 98–107)
CO2: 24 MMOL/L (ref 22–29)
CREAT SERPL-MCNC: 0.5 MG/DL (ref 0.5–1)
GFR AFRICAN AMERICAN: >60
GFR NON-AFRICAN AMERICAN: >60 ML/MIN/1.73
GLUCOSE BLD-MCNC: 282 MG/DL (ref 74–99)
HCT VFR BLD CALC: 39.4 % (ref 34–48)
HEMOGLOBIN: 11.7 G/DL (ref 11.5–15.5)
MCH RBC QN AUTO: 30.5 PG (ref 26–35)
MCHC RBC AUTO-ENTMCNC: 29.7 % (ref 32–34.5)
MCV RBC AUTO: 102.9 FL (ref 80–99.9)
METER GLUCOSE: 151 MG/DL (ref 74–99)
METER GLUCOSE: 243 MG/DL (ref 74–99)
METER GLUCOSE: 323 MG/DL (ref 74–99)
PDW BLD-RTO: 13.2 FL (ref 11.5–15)
PLATELET # BLD: 177 E9/L (ref 130–450)
PMV BLD AUTO: 10.4 FL (ref 7–12)
POTASSIUM SERPL-SCNC: 4.4 MMOL/L (ref 3.5–5)
RBC # BLD: 3.83 E12/L (ref 3.5–5.5)
SODIUM BLD-SCNC: 141 MMOL/L (ref 132–146)
WBC # BLD: 6.6 E9/L (ref 4.5–11.5)

## 2019-11-04 PROCEDURE — 97535 SELF CARE MNGMENT TRAINING: CPT

## 2019-11-04 PROCEDURE — 2580000003 HC RX 258: Performed by: FAMILY MEDICINE

## 2019-11-04 PROCEDURE — 2700000000 HC OXYGEN THERAPY PER DAY

## 2019-11-04 PROCEDURE — 6360000002 HC RX W HCPCS: Performed by: FAMILY MEDICINE

## 2019-11-04 PROCEDURE — 97530 THERAPEUTIC ACTIVITIES: CPT

## 2019-11-04 PROCEDURE — 6370000000 HC RX 637 (ALT 250 FOR IP): Performed by: FAMILY MEDICINE

## 2019-11-04 PROCEDURE — 85027 COMPLETE CBC AUTOMATED: CPT

## 2019-11-04 PROCEDURE — 36415 COLL VENOUS BLD VENIPUNCTURE: CPT

## 2019-11-04 PROCEDURE — 1200000000 HC SEMI PRIVATE

## 2019-11-04 PROCEDURE — 80048 BASIC METABOLIC PNL TOTAL CA: CPT

## 2019-11-04 PROCEDURE — 82962 GLUCOSE BLOOD TEST: CPT

## 2019-11-04 RX ADMIN — INSULIN LISPRO 4 UNITS: 100 INJECTION, SOLUTION INTRAVENOUS; SUBCUTANEOUS at 08:35

## 2019-11-04 RX ADMIN — MELOXICAM 7.5 MG: 7.5 TABLET ORAL at 08:33

## 2019-11-04 RX ADMIN — BUMETANIDE 1 MG: 1 TABLET ORAL at 08:33

## 2019-11-04 RX ADMIN — INSULIN LISPRO 2 UNITS: 100 INJECTION, SOLUTION INTRAVENOUS; SUBCUTANEOUS at 13:04

## 2019-11-04 RX ADMIN — LEVOTHYROXINE SODIUM 50 MCG: 50 TABLET ORAL at 06:19

## 2019-11-04 RX ADMIN — SODIUM CHLORIDE: 9 INJECTION, SOLUTION INTRAVENOUS at 09:46

## 2019-11-04 RX ADMIN — PREDNISOLONE ACETATE 1 DROP: 10 SUSPENSION/ DROPS OPHTHALMIC at 08:33

## 2019-11-04 RX ADMIN — ENOXAPARIN SODIUM 40 MG: 40 INJECTION SUBCUTANEOUS at 08:33

## 2019-11-04 RX ADMIN — INSULIN LISPRO 1 UNITS: 100 INJECTION, SOLUTION INTRAVENOUS; SUBCUTANEOUS at 17:33

## 2019-11-04 RX ADMIN — ROSUVASTATIN CALCIUM 10 MG: 10 TABLET, FILM COATED ORAL at 08:34

## 2019-11-04 RX ADMIN — INSULIN GLARGINE 10 UNITS: 100 INJECTION, SOLUTION SUBCUTANEOUS at 08:34

## 2019-11-04 RX ADMIN — PANTOPRAZOLE SODIUM 40 MG: 40 TABLET, DELAYED RELEASE ORAL at 06:19

## 2019-11-04 ASSESSMENT — PAIN SCALES - GENERAL
PAINLEVEL_OUTOF10: 0
PAINLEVEL_OUTOF10: 8
PAINLEVEL_OUTOF10: 0
PAINLEVEL_OUTOF10: 0
PAINLEVEL_OUTOF10: 3
PAINLEVEL_OUTOF10: 0
PAINLEVEL_OUTOF10: 0

## 2019-11-04 ASSESSMENT — PAIN DESCRIPTION - PAIN TYPE
TYPE: ACUTE PAIN
TYPE: ACUTE PAIN

## 2019-11-04 ASSESSMENT — PAIN - FUNCTIONAL ASSESSMENT: PAIN_FUNCTIONAL_ASSESSMENT: PREVENTS OR INTERFERES SOME ACTIVE ACTIVITIES AND ADLS

## 2019-11-04 ASSESSMENT — PAIN DESCRIPTION - ORIENTATION
ORIENTATION: RIGHT;LEFT;UPPER
ORIENTATION: RIGHT;LEFT;UPPER

## 2019-11-04 ASSESSMENT — PAIN DESCRIPTION - DESCRIPTORS
DESCRIPTORS: CRAMPING;DISCOMFORT
DESCRIPTORS: ACHING;DISCOMFORT

## 2019-11-04 ASSESSMENT — PAIN DESCRIPTION - LOCATION
LOCATION: ABDOMEN
LOCATION: ABDOMEN

## 2019-11-04 ASSESSMENT — PAIN DESCRIPTION - ONSET
ONSET: ON-GOING
ONSET: ON-GOING

## 2019-11-04 ASSESSMENT — PAIN DESCRIPTION - FREQUENCY
FREQUENCY: CONTINUOUS
FREQUENCY: CONTINUOUS

## 2019-11-04 ASSESSMENT — PAIN DESCRIPTION - PROGRESSION: CLINICAL_PROGRESSION: NOT CHANGED

## 2019-11-05 ENCOUNTER — TELEPHONE (OUTPATIENT)
Dept: CARDIOLOGY CLINIC | Age: 84
End: 2019-11-05

## 2019-11-05 VITALS
HEIGHT: 62 IN | DIASTOLIC BLOOD PRESSURE: 54 MMHG | OXYGEN SATURATION: 96 % | RESPIRATION RATE: 20 BRPM | BODY MASS INDEX: 17.66 KG/M2 | WEIGHT: 96 LBS | TEMPERATURE: 98.6 F | HEART RATE: 80 BPM | SYSTOLIC BLOOD PRESSURE: 116 MMHG

## 2019-11-05 LAB
ANION GAP SERPL CALCULATED.3IONS-SCNC: 10 MMOL/L (ref 7–16)
BUN BLDV-MCNC: 19 MG/DL (ref 8–23)
CALCIUM SERPL-MCNC: 8.6 MG/DL (ref 8.6–10.2)
CHLORIDE BLD-SCNC: 105 MMOL/L (ref 98–107)
CO2: 26 MMOL/L (ref 22–29)
CREAT SERPL-MCNC: 0.4 MG/DL (ref 0.5–1)
GFR AFRICAN AMERICAN: >60
GFR NON-AFRICAN AMERICAN: >60 ML/MIN/1.73
GLUCOSE BLD-MCNC: 94 MG/DL (ref 74–99)
HCT VFR BLD CALC: 34.7 % (ref 34–48)
HEMOGLOBIN: 10.7 G/DL (ref 11.5–15.5)
MCH RBC QN AUTO: 31.1 PG (ref 26–35)
MCHC RBC AUTO-ENTMCNC: 30.8 % (ref 32–34.5)
MCV RBC AUTO: 100.9 FL (ref 80–99.9)
METER GLUCOSE: 118 MG/DL (ref 74–99)
METER GLUCOSE: 132 MG/DL (ref 74–99)
METER GLUCOSE: 138 MG/DL (ref 74–99)
METER GLUCOSE: 79 MG/DL (ref 74–99)
METER GLUCOSE: <40 MG/DL (ref 74–99)
PDW BLD-RTO: 13.5 FL (ref 11.5–15)
PLATELET # BLD: 161 E9/L (ref 130–450)
PMV BLD AUTO: 10.7 FL (ref 7–12)
POTASSIUM SERPL-SCNC: 3.4 MMOL/L (ref 3.5–5)
RBC # BLD: 3.44 E12/L (ref 3.5–5.5)
SODIUM BLD-SCNC: 141 MMOL/L (ref 132–146)
WBC # BLD: 5.1 E9/L (ref 4.5–11.5)

## 2019-11-05 PROCEDURE — 85027 COMPLETE CBC AUTOMATED: CPT

## 2019-11-05 PROCEDURE — 2700000000 HC OXYGEN THERAPY PER DAY

## 2019-11-05 PROCEDURE — 6360000002 HC RX W HCPCS: Performed by: FAMILY MEDICINE

## 2019-11-05 PROCEDURE — 82962 GLUCOSE BLOOD TEST: CPT

## 2019-11-05 PROCEDURE — 36415 COLL VENOUS BLD VENIPUNCTURE: CPT

## 2019-11-05 PROCEDURE — 6370000000 HC RX 637 (ALT 250 FOR IP): Performed by: FAMILY MEDICINE

## 2019-11-05 PROCEDURE — 80048 BASIC METABOLIC PNL TOTAL CA: CPT

## 2019-11-05 PROCEDURE — 2580000003 HC RX 258: Performed by: FAMILY MEDICINE

## 2019-11-05 RX ADMIN — TIOTROPIUM BROMIDE 18 MCG: 18 CAPSULE ORAL; RESPIRATORY (INHALATION) at 08:34

## 2019-11-05 RX ADMIN — ENOXAPARIN SODIUM 40 MG: 40 INJECTION SUBCUTANEOUS at 08:34

## 2019-11-05 RX ADMIN — PANTOPRAZOLE SODIUM 40 MG: 40 TABLET, DELAYED RELEASE ORAL at 05:58

## 2019-11-05 RX ADMIN — INSULIN GLARGINE 10 UNITS: 100 INJECTION, SOLUTION SUBCUTANEOUS at 08:35

## 2019-11-05 RX ADMIN — LEVOTHYROXINE SODIUM 50 MCG: 50 TABLET ORAL at 05:58

## 2019-11-05 RX ADMIN — BUMETANIDE 1 MG: 1 TABLET ORAL at 08:35

## 2019-11-05 RX ADMIN — DEXTROSE MONOHYDRATE 12.5 G: 500 INJECTION PARENTERAL at 00:29

## 2019-11-05 RX ADMIN — MELOXICAM 7.5 MG: 7.5 TABLET ORAL at 08:35

## 2019-11-05 RX ADMIN — PREDNISOLONE ACETATE 1 DROP: 10 SUSPENSION/ DROPS OPHTHALMIC at 08:34

## 2019-11-05 RX ADMIN — ROSUVASTATIN CALCIUM 10 MG: 10 TABLET, FILM COATED ORAL at 08:35

## 2019-11-05 ASSESSMENT — PAIN DESCRIPTION - DESCRIPTORS: DESCRIPTORS: ACHING;DISCOMFORT

## 2019-11-05 ASSESSMENT — PAIN SCALES - GENERAL
PAINLEVEL_OUTOF10: 0
PAINLEVEL_OUTOF10: 0
PAINLEVEL_OUTOF10: 2

## 2019-11-05 ASSESSMENT — PAIN DESCRIPTION - PAIN TYPE: TYPE: ACUTE PAIN

## 2019-11-05 ASSESSMENT — PAIN DESCRIPTION - ONSET: ONSET: GRADUAL

## 2019-11-05 ASSESSMENT — PAIN DESCRIPTION - ORIENTATION: ORIENTATION: RIGHT;LEFT;UPPER

## 2019-11-05 ASSESSMENT — PAIN DESCRIPTION - PROGRESSION: CLINICAL_PROGRESSION: NOT CHANGED

## 2019-11-05 ASSESSMENT — PAIN DESCRIPTION - FREQUENCY: FREQUENCY: INTERMITTENT

## 2019-11-05 ASSESSMENT — PAIN DESCRIPTION - LOCATION: LOCATION: ABDOMEN

## 2019-11-05 ASSESSMENT — PAIN - FUNCTIONAL ASSESSMENT: PAIN_FUNCTIONAL_ASSESSMENT: ACTIVITIES ARE NOT PREVENTED

## 2019-11-06 ENCOUNTER — APPOINTMENT (OUTPATIENT)
Dept: GENERAL RADIOLOGY | Age: 84
DRG: 189 | End: 2019-11-06
Payer: MEDICARE

## 2019-11-06 ENCOUNTER — HOSPITAL ENCOUNTER (INPATIENT)
Age: 84
LOS: 5 days | Discharge: HOME HEALTH | DRG: 189 | End: 2019-11-12
Attending: EMERGENCY MEDICINE | Admitting: FAMILY MEDICINE
Payer: MEDICARE

## 2019-11-06 DIAGNOSIS — J96.01 ACUTE RESPIRATORY FAILURE WITH HYPOXIA (HCC): ICD-10-CM

## 2019-11-06 DIAGNOSIS — E43 SEVERE PROTEIN-CALORIE MALNUTRITION (HCC): Chronic | ICD-10-CM

## 2019-11-06 DIAGNOSIS — R09.02 HYPOXIA: Primary | ICD-10-CM

## 2019-11-06 DIAGNOSIS — J44.1 COPD EXACERBATION (HCC): ICD-10-CM

## 2019-11-06 LAB
ADENOVIRUS BY PCR: NOT DETECTED
ALBUMIN SERPL-MCNC: 3.1 G/DL (ref 3.5–5.2)
ALP BLD-CCNC: 87 U/L (ref 35–104)
ALT SERPL-CCNC: 8 U/L (ref 0–32)
ANION GAP SERPL CALCULATED.3IONS-SCNC: 11 MMOL/L (ref 7–16)
AST SERPL-CCNC: 14 U/L (ref 0–31)
BASOPHILS ABSOLUTE: 0.01 E9/L (ref 0–0.2)
BASOPHILS RELATIVE PERCENT: 0.3 % (ref 0–2)
BILIRUB SERPL-MCNC: 0.3 MG/DL (ref 0–1.2)
BORDETELLA PARAPERTUSSIS BY PCR: NOT DETECTED
BORDETELLA PERTUSSIS BY PCR: NOT DETECTED
BUN BLDV-MCNC: 17 MG/DL (ref 8–23)
CALCIUM SERPL-MCNC: 8.7 MG/DL (ref 8.6–10.2)
CHLAMYDOPHILIA PNEUMONIAE BY PCR: NOT DETECTED
CHLORIDE BLD-SCNC: 95 MMOL/L (ref 98–107)
CO2: 30 MMOL/L (ref 22–29)
CORONAVIRUS 229E BY PCR: NOT DETECTED
CORONAVIRUS HKU1 BY PCR: NOT DETECTED
CORONAVIRUS NL63 BY PCR: NOT DETECTED
CORONAVIRUS OC43 BY PCR: NOT DETECTED
CREAT SERPL-MCNC: 0.4 MG/DL (ref 0.5–1)
EOSINOPHILS ABSOLUTE: 0.01 E9/L (ref 0.05–0.5)
EOSINOPHILS RELATIVE PERCENT: 0.3 % (ref 0–6)
GFR AFRICAN AMERICAN: >60
GFR NON-AFRICAN AMERICAN: >60 ML/MIN/1.73
GLUCOSE BLD-MCNC: 448 MG/DL (ref 74–99)
GLUCOSE BLD-MCNC: 782 MG/DL (ref 74–99)
GLUCOSE BLD-MCNC: 819 MG/DL (ref 74–99)
HCT VFR BLD CALC: 37.7 % (ref 34–48)
HEMOGLOBIN: 11.6 G/DL (ref 11.5–15.5)
HUMAN METAPNEUMOVIRUS BY PCR: NOT DETECTED
HUMAN RHINOVIRUS/ENTEROVIRUS BY PCR: NOT DETECTED
IMMATURE GRANULOCYTES #: 0.02 E9/L
IMMATURE GRANULOCYTES %: 0.6 % (ref 0–5)
INFLUENZA A BY PCR: NOT DETECTED
INFLUENZA B BY PCR: NOT DETECTED
LYMPHOCYTES ABSOLUTE: 0.71 E9/L (ref 1.5–4)
LYMPHOCYTES RELATIVE PERCENT: 21.6 % (ref 20–42)
MCH RBC QN AUTO: 30.5 PG (ref 26–35)
MCHC RBC AUTO-ENTMCNC: 30.8 % (ref 32–34.5)
MCV RBC AUTO: 99.2 FL (ref 80–99.9)
METER GLUCOSE: >500 MG/DL (ref 74–99)
MONOCYTES ABSOLUTE: 0.28 E9/L (ref 0.1–0.95)
MONOCYTES RELATIVE PERCENT: 8.5 % (ref 2–12)
MYCOPLASMA PNEUMONIAE BY PCR: NOT DETECTED
NEUTROPHILS ABSOLUTE: 2.26 E9/L (ref 1.8–7.3)
NEUTROPHILS RELATIVE PERCENT: 68.7 % (ref 43–80)
PARAINFLUENZA VIRUS 1 BY PCR: NOT DETECTED
PARAINFLUENZA VIRUS 2 BY PCR: NOT DETECTED
PARAINFLUENZA VIRUS 3 BY PCR: NOT DETECTED
PARAINFLUENZA VIRUS 4 BY PCR: NOT DETECTED
PDW BLD-RTO: 13.5 FL (ref 11.5–15)
PLATELET # BLD: 143 E9/L (ref 130–450)
PMV BLD AUTO: 10.2 FL (ref 7–12)
POTASSIUM SERPL-SCNC: 3.7 MMOL/L (ref 3.5–5)
PRO-BNP: 1930 PG/ML (ref 0–450)
PROCALCITONIN: 0.03 NG/ML (ref 0–0.08)
RBC # BLD: 3.8 E12/L (ref 3.5–5.5)
RESPIRATORY SYNCYTIAL VIRUS BY PCR: NOT DETECTED
SODIUM BLD-SCNC: 136 MMOL/L (ref 132–146)
TOTAL PROTEIN: 5.9 G/DL (ref 6.4–8.3)
TROPONIN: <0.01 NG/ML (ref 0–0.03)
WBC # BLD: 3.3 E9/L (ref 4.5–11.5)

## 2019-11-06 PROCEDURE — 93005 ELECTROCARDIOGRAM TRACING: CPT | Performed by: EMERGENCY MEDICINE

## 2019-11-06 PROCEDURE — 84484 ASSAY OF TROPONIN QUANT: CPT

## 2019-11-06 PROCEDURE — 0100U HC RESPIRPTHGN MULT REV TRANS & AMP PRB TECH 21 TRGT: CPT

## 2019-11-06 PROCEDURE — 80048 BASIC METABOLIC PNL TOTAL CA: CPT

## 2019-11-06 PROCEDURE — 87040 BLOOD CULTURE FOR BACTERIA: CPT

## 2019-11-06 PROCEDURE — 99285 EMERGENCY DEPT VISIT HI MDM: CPT

## 2019-11-06 PROCEDURE — 6360000002 HC RX W HCPCS: Performed by: EMERGENCY MEDICINE

## 2019-11-06 PROCEDURE — 2700000000 HC OXYGEN THERAPY PER DAY

## 2019-11-06 PROCEDURE — 6360000002 HC RX W HCPCS: Performed by: FAMILY MEDICINE

## 2019-11-06 PROCEDURE — 6370000000 HC RX 637 (ALT 250 FOR IP): Performed by: FAMILY MEDICINE

## 2019-11-06 PROCEDURE — 94640 AIRWAY INHALATION TREATMENT: CPT

## 2019-11-06 PROCEDURE — 82947 ASSAY GLUCOSE BLOOD QUANT: CPT

## 2019-11-06 PROCEDURE — 82962 GLUCOSE BLOOD TEST: CPT

## 2019-11-06 PROCEDURE — 96372 THER/PROPH/DIAG INJ SC/IM: CPT

## 2019-11-06 PROCEDURE — 96376 TX/PRO/DX INJ SAME DRUG ADON: CPT

## 2019-11-06 PROCEDURE — 80053 COMPREHEN METABOLIC PANEL: CPT

## 2019-11-06 PROCEDURE — 6360000002 HC RX W HCPCS: Performed by: INTERNAL MEDICINE

## 2019-11-06 PROCEDURE — 36415 COLL VENOUS BLD VENIPUNCTURE: CPT

## 2019-11-06 PROCEDURE — 6370000000 HC RX 637 (ALT 250 FOR IP): Performed by: EMERGENCY MEDICINE

## 2019-11-06 PROCEDURE — G0378 HOSPITAL OBSERVATION PER HR: HCPCS

## 2019-11-06 PROCEDURE — 96374 THER/PROPH/DIAG INJ IV PUSH: CPT

## 2019-11-06 PROCEDURE — 84145 PROCALCITONIN (PCT): CPT

## 2019-11-06 PROCEDURE — 85025 COMPLETE CBC W/AUTO DIFF WBC: CPT

## 2019-11-06 PROCEDURE — 71045 X-RAY EXAM CHEST 1 VIEW: CPT

## 2019-11-06 PROCEDURE — 83880 ASSAY OF NATRIURETIC PEPTIDE: CPT

## 2019-11-06 RX ORDER — PANTOPRAZOLE SODIUM 40 MG/1
40 TABLET, DELAYED RELEASE ORAL
Status: DISCONTINUED | OUTPATIENT
Start: 2019-11-07 | End: 2019-11-07

## 2019-11-06 RX ORDER — PREDNISOLONE ACETATE 10 MG/ML
1 SUSPENSION/ DROPS OPHTHALMIC DAILY
Status: DISCONTINUED | OUTPATIENT
Start: 2019-11-06 | End: 2019-11-12 | Stop reason: HOSPADM

## 2019-11-06 RX ORDER — INSULIN GLARGINE 100 [IU]/ML
10 INJECTION, SOLUTION SUBCUTANEOUS 2 TIMES DAILY
Status: DISCONTINUED | OUTPATIENT
Start: 2019-11-06 | End: 2019-11-07

## 2019-11-06 RX ORDER — DEXTROSE MONOHYDRATE 25 G/50ML
12.5 INJECTION, SOLUTION INTRAVENOUS PRN
Status: DISCONTINUED | OUTPATIENT
Start: 2019-11-06 | End: 2019-11-07 | Stop reason: SDUPTHER

## 2019-11-06 RX ORDER — ONDANSETRON 4 MG/1
4 TABLET, FILM COATED ORAL EVERY 12 HOURS PRN
Status: DISCONTINUED | OUTPATIENT
Start: 2019-11-06 | End: 2019-11-07

## 2019-11-06 RX ORDER — LEVOTHYROXINE SODIUM 0.05 MG/1
50 TABLET ORAL DAILY
Status: DISCONTINUED | OUTPATIENT
Start: 2019-11-06 | End: 2019-11-12 | Stop reason: HOSPADM

## 2019-11-06 RX ORDER — METHYLPREDNISOLONE SODIUM SUCCINATE 125 MG/2ML
125 INJECTION, POWDER, LYOPHILIZED, FOR SOLUTION INTRAMUSCULAR; INTRAVENOUS ONCE
Status: COMPLETED | OUTPATIENT
Start: 2019-11-06 | End: 2019-11-06

## 2019-11-06 RX ORDER — ACETAMINOPHEN 500 MG
500 TABLET ORAL EVERY 6 HOURS PRN
Status: DISCONTINUED | OUTPATIENT
Start: 2019-11-06 | End: 2019-11-08

## 2019-11-06 RX ORDER — NICOTINE POLACRILEX 4 MG
15 LOZENGE BUCCAL PRN
Status: DISCONTINUED | OUTPATIENT
Start: 2019-11-06 | End: 2019-11-12 | Stop reason: HOSPADM

## 2019-11-06 RX ORDER — IPRATROPIUM BROMIDE AND ALBUTEROL SULFATE 2.5; .5 MG/3ML; MG/3ML
1 SOLUTION RESPIRATORY (INHALATION)
Status: COMPLETED | OUTPATIENT
Start: 2019-11-06 | End: 2019-11-06

## 2019-11-06 RX ORDER — INSULIN GLARGINE 100 [IU]/ML
25 INJECTION, SOLUTION SUBCUTANEOUS ONCE
Status: COMPLETED | OUTPATIENT
Start: 2019-11-06 | End: 2019-11-06

## 2019-11-06 RX ORDER — CALCITONIN SALMON 200 [IU]/.09ML
1 SPRAY, METERED NASAL DAILY
Status: DISCONTINUED | OUTPATIENT
Start: 2019-11-06 | End: 2019-11-06 | Stop reason: CLARIF

## 2019-11-06 RX ORDER — METHYLPREDNISOLONE SODIUM SUCCINATE 40 MG/ML
40 INJECTION, POWDER, LYOPHILIZED, FOR SOLUTION INTRAMUSCULAR; INTRAVENOUS EVERY 6 HOURS
Status: DISCONTINUED | OUTPATIENT
Start: 2019-11-06 | End: 2019-11-07

## 2019-11-06 RX ORDER — BUDESONIDE 0.5 MG/2ML
1000 INHALANT ORAL 2 TIMES DAILY
Status: DISCONTINUED | OUTPATIENT
Start: 2019-11-06 | End: 2019-11-12 | Stop reason: HOSPADM

## 2019-11-06 RX ORDER — IPRATROPIUM BROMIDE AND ALBUTEROL SULFATE 2.5; .5 MG/3ML; MG/3ML
1 SOLUTION RESPIRATORY (INHALATION)
Status: DISCONTINUED | OUTPATIENT
Start: 2019-11-06 | End: 2019-11-12 | Stop reason: HOSPADM

## 2019-11-06 RX ORDER — CYANOCOBALAMIN 1000 UG/ML
1000 INJECTION INTRAMUSCULAR; SUBCUTANEOUS
Status: DISCONTINUED | OUTPATIENT
Start: 2019-11-06 | End: 2019-11-12 | Stop reason: HOSPADM

## 2019-11-06 RX ORDER — FORMOTEROL FUMARATE 20 UG/2ML
20 SOLUTION RESPIRATORY (INHALATION) 2 TIMES DAILY
Status: DISCONTINUED | OUTPATIENT
Start: 2019-11-06 | End: 2019-11-12 | Stop reason: HOSPADM

## 2019-11-06 RX ORDER — DEXTROSE MONOHYDRATE 50 MG/ML
100 INJECTION, SOLUTION INTRAVENOUS PRN
Status: DISCONTINUED | OUTPATIENT
Start: 2019-11-06 | End: 2019-11-12 | Stop reason: HOSPADM

## 2019-11-06 RX ADMIN — LEVOTHYROXINE SODIUM 50 MCG: 50 TABLET ORAL at 17:34

## 2019-11-06 RX ADMIN — INSULIN LISPRO 30 UNITS: 100 INJECTION, SOLUTION INTRAVENOUS; SUBCUTANEOUS at 23:09

## 2019-11-06 RX ADMIN — IPRATROPIUM BROMIDE AND ALBUTEROL SULFATE 1 AMPULE: .5; 3 SOLUTION RESPIRATORY (INHALATION) at 12:20

## 2019-11-06 RX ADMIN — INSULIN LISPRO 20 UNITS: 100 INJECTION, SOLUTION INTRAVENOUS; SUBCUTANEOUS at 19:54

## 2019-11-06 RX ADMIN — CYANOCOBALAMIN 1000 MCG: 1000 INJECTION, SOLUTION INTRAMUSCULAR at 17:34

## 2019-11-06 RX ADMIN — METHYLPREDNISOLONE SODIUM SUCCINATE 125 MG: 125 INJECTION, POWDER, FOR SOLUTION INTRAMUSCULAR; INTRAVENOUS at 12:18

## 2019-11-06 RX ADMIN — METHYLPREDNISOLONE SODIUM SUCCINATE 40 MG: 40 INJECTION, POWDER, FOR SOLUTION INTRAMUSCULAR; INTRAVENOUS at 17:24

## 2019-11-06 RX ADMIN — INSULIN GLARGINE 10 UNITS: 100 INJECTION, SOLUTION SUBCUTANEOUS at 23:07

## 2019-11-06 RX ADMIN — BUDESONIDE 1000 MCG: 0.5 SUSPENSION RESPIRATORY (INHALATION) at 22:29

## 2019-11-06 RX ADMIN — IPRATROPIUM BROMIDE AND ALBUTEROL SULFATE 1 AMPULE: .5; 3 SOLUTION RESPIRATORY (INHALATION) at 12:19

## 2019-11-06 RX ADMIN — ACETAMINOPHEN 500 MG: 500 TABLET ORAL at 18:36

## 2019-11-06 RX ADMIN — FORMOTEROL FUMARATE DIHYDRATE 20 MCG: 20 SOLUTION RESPIRATORY (INHALATION) at 22:29

## 2019-11-06 RX ADMIN — PREDNISOLONE ACETATE 1 DROP: 10 SUSPENSION/ DROPS OPHTHALMIC at 17:34

## 2019-11-06 RX ADMIN — ENOXAPARIN SODIUM 40 MG: 40 INJECTION SUBCUTANEOUS at 17:24

## 2019-11-06 RX ADMIN — INSULIN GLARGINE 25 UNITS: 100 INJECTION, SOLUTION SUBCUTANEOUS at 23:05

## 2019-11-06 RX ADMIN — IPRATROPIUM BROMIDE AND ALBUTEROL SULFATE 1 AMPULE: .5; 3 SOLUTION RESPIRATORY (INHALATION) at 12:22

## 2019-11-06 RX ADMIN — IPRATROPIUM BROMIDE AND ALBUTEROL SULFATE 1 AMPULE: .5; 3 SOLUTION RESPIRATORY (INHALATION) at 22:30

## 2019-11-06 ASSESSMENT — PAIN DESCRIPTION - ORIENTATION: ORIENTATION: RIGHT;LEFT;MID

## 2019-11-06 ASSESSMENT — PAIN DESCRIPTION - PAIN TYPE: TYPE: ACUTE PAIN

## 2019-11-06 ASSESSMENT — PAIN SCALES - GENERAL
PAINLEVEL_OUTOF10: 0
PAINLEVEL_OUTOF10: 0
PAINLEVEL_OUTOF10: 4

## 2019-11-06 ASSESSMENT — PAIN DESCRIPTION - FREQUENCY: FREQUENCY: INTERMITTENT

## 2019-11-06 ASSESSMENT — PAIN DESCRIPTION - PROGRESSION: CLINICAL_PROGRESSION: NOT CHANGED

## 2019-11-06 ASSESSMENT — PAIN DESCRIPTION - LOCATION: LOCATION: BUTTOCKS

## 2019-11-06 ASSESSMENT — PAIN - FUNCTIONAL ASSESSMENT: PAIN_FUNCTIONAL_ASSESSMENT: PREVENTS OR INTERFERES SOME ACTIVE ACTIVITIES AND ADLS

## 2019-11-06 ASSESSMENT — PAIN DESCRIPTION - ONSET: ONSET: ON-GOING

## 2019-11-06 ASSESSMENT — PAIN DESCRIPTION - DESCRIPTORS: DESCRIPTORS: PINS AND NEEDLES

## 2019-11-07 ENCOUNTER — APPOINTMENT (OUTPATIENT)
Dept: GENERAL RADIOLOGY | Age: 84
DRG: 189 | End: 2019-11-07
Payer: MEDICARE

## 2019-11-07 LAB
ALBUMIN SERPL-MCNC: 3 G/DL (ref 3.5–5.2)
ALP BLD-CCNC: 71 U/L (ref 35–104)
ALT SERPL-CCNC: 8 U/L (ref 0–32)
ANION GAP SERPL CALCULATED.3IONS-SCNC: 1 MMOL/L (ref 7–16)
ANION GAP SERPL CALCULATED.3IONS-SCNC: 7 MMOL/L (ref 7–16)
ANION GAP SERPL CALCULATED.3IONS-SCNC: 7 MMOL/L (ref 7–16)
ANION GAP SERPL CALCULATED.3IONS-SCNC: 8 MMOL/L (ref 7–16)
ANION GAP SERPL CALCULATED.3IONS-SCNC: 9 MMOL/L (ref 7–16)
ANION GAP SERPL CALCULATED.3IONS-SCNC: 9 MMOL/L (ref 7–16)
AST SERPL-CCNC: 10 U/L (ref 0–31)
BETA-HYDROXYBUTYRATE: 0.08 MMOL/L (ref 0.02–0.27)
BETA-HYDROXYBUTYRATE: 0.09 MMOL/L (ref 0.02–0.27)
BILIRUB SERPL-MCNC: 0.2 MG/DL (ref 0–1.2)
BILIRUBIN DIRECT: <0.2 MG/DL (ref 0–0.3)
BILIRUBIN, INDIRECT: ABNORMAL MG/DL (ref 0–1)
BUN BLDV-MCNC: 12 MG/DL (ref 8–23)
BUN BLDV-MCNC: 14 MG/DL (ref 8–23)
BUN BLDV-MCNC: 16 MG/DL (ref 8–23)
BUN BLDV-MCNC: 16 MG/DL (ref 8–23)
CALCIUM SERPL-MCNC: 8.1 MG/DL (ref 8.6–10.2)
CALCIUM SERPL-MCNC: 8.3 MG/DL (ref 8.6–10.2)
CALCIUM SERPL-MCNC: 8.4 MG/DL (ref 8.6–10.2)
CALCIUM SERPL-MCNC: 8.6 MG/DL (ref 8.6–10.2)
CALCIUM SERPL-MCNC: 8.8 MG/DL (ref 8.6–10.2)
CALCIUM SERPL-MCNC: 9.2 MG/DL (ref 8.6–10.2)
CHLORIDE BLD-SCNC: 100 MMOL/L (ref 98–107)
CHLORIDE BLD-SCNC: 102 MMOL/L (ref 98–107)
CHLORIDE BLD-SCNC: 104 MMOL/L (ref 98–107)
CHLORIDE BLD-SCNC: 97 MMOL/L (ref 98–107)
CHLORIDE BLD-SCNC: 99 MMOL/L (ref 98–107)
CHLORIDE BLD-SCNC: 99 MMOL/L (ref 98–107)
CO2: 28 MMOL/L (ref 22–29)
CO2: 31 MMOL/L (ref 22–29)
CO2: 31 MMOL/L (ref 22–29)
CO2: 32 MMOL/L (ref 22–29)
CO2: 33 MMOL/L (ref 22–29)
CO2: 37 MMOL/L (ref 22–29)
CREAT SERPL-MCNC: 0.3 MG/DL (ref 0.5–1)
CREAT SERPL-MCNC: 0.4 MG/DL (ref 0.5–1)
EKG ATRIAL RATE: 81 BPM
EKG P AXIS: 89 DEGREES
EKG P-R INTERVAL: 142 MS
EKG Q-T INTERVAL: 398 MS
EKG QRS DURATION: 88 MS
EKG QTC CALCULATION (BAZETT): 462 MS
EKG R AXIS: 83 DEGREES
EKG T AXIS: 75 DEGREES
EKG VENTRICULAR RATE: 81 BPM
GFR AFRICAN AMERICAN: >60
GFR NON-AFRICAN AMERICAN: >60 ML/MIN/1.73
GLUCOSE BLD-MCNC: 145 MG/DL (ref 74–99)
GLUCOSE BLD-MCNC: 149 MG/DL (ref 74–99)
GLUCOSE BLD-MCNC: 224 MG/DL (ref 74–99)
GLUCOSE BLD-MCNC: 318 MG/DL (ref 74–99)
GLUCOSE BLD-MCNC: 694 MG/DL (ref 74–99)
GLUCOSE BLD-MCNC: 98 MG/DL (ref 74–99)
HBA1C MFR BLD: 6.7 % (ref 4–5.6)
LACTIC ACID: 1.9 MMOL/L (ref 0.5–2.2)
MAGNESIUM: 1.5 MG/DL (ref 1.6–2.6)
MAGNESIUM: 1.7 MG/DL (ref 1.6–2.6)
MAGNESIUM: 1.7 MG/DL (ref 1.6–2.6)
METER GLUCOSE: 110 MG/DL (ref 74–99)
METER GLUCOSE: 146 MG/DL (ref 74–99)
METER GLUCOSE: 154 MG/DL (ref 74–99)
METER GLUCOSE: 181 MG/DL (ref 74–99)
METER GLUCOSE: 198 MG/DL (ref 74–99)
METER GLUCOSE: 202 MG/DL (ref 74–99)
METER GLUCOSE: 207 MG/DL (ref 74–99)
METER GLUCOSE: 216 MG/DL (ref 74–99)
METER GLUCOSE: 218 MG/DL (ref 74–99)
METER GLUCOSE: 220 MG/DL (ref 74–99)
METER GLUCOSE: 249 MG/DL (ref 74–99)
METER GLUCOSE: 310 MG/DL (ref 74–99)
METER GLUCOSE: 371 MG/DL (ref 74–99)
METER GLUCOSE: 443 MG/DL (ref 74–99)
METER GLUCOSE: 94 MG/DL (ref 74–99)
METER GLUCOSE: >500 MG/DL (ref 74–99)
OSMOLALITY: 311 MOSM/KG (ref 285–310)
PHOSPHORUS: 1.6 MG/DL (ref 2.5–4.5)
PHOSPHORUS: 1.8 MG/DL (ref 2.5–4.5)
PHOSPHORUS: 2.2 MG/DL (ref 2.5–4.5)
PHOSPHORUS: 2.5 MG/DL (ref 2.5–4.5)
PHOSPHORUS: 3.1 MG/DL (ref 2.5–4.5)
POTASSIUM SERPL-SCNC: 3.1 MMOL/L (ref 3.5–5)
POTASSIUM SERPL-SCNC: 3.9 MMOL/L (ref 3.5–5)
POTASSIUM SERPL-SCNC: 3.9 MMOL/L (ref 3.5–5)
POTASSIUM SERPL-SCNC: 4.1 MMOL/L (ref 3.5–5)
POTASSIUM SERPL-SCNC: 4.2 MMOL/L (ref 3.5–5)
POTASSIUM SERPL-SCNC: 4.2 MMOL/L (ref 3.5–5)
SODIUM BLD-SCNC: 136 MMOL/L (ref 132–146)
SODIUM BLD-SCNC: 137 MMOL/L (ref 132–146)
SODIUM BLD-SCNC: 138 MMOL/L (ref 132–146)
SODIUM BLD-SCNC: 140 MMOL/L (ref 132–146)
SODIUM BLD-SCNC: 141 MMOL/L (ref 132–146)
SODIUM BLD-SCNC: 142 MMOL/L (ref 132–146)
TOTAL PROTEIN: 5.3 G/DL (ref 6.4–8.3)
TROPONIN: <0.01 NG/ML (ref 0–0.03)

## 2019-11-07 PROCEDURE — 36556 INSERT NON-TUNNEL CV CATH: CPT

## 2019-11-07 PROCEDURE — C9113 INJ PANTOPRAZOLE SODIUM, VIA: HCPCS | Performed by: INTERNAL MEDICINE

## 2019-11-07 PROCEDURE — 94640 AIRWAY INHALATION TREATMENT: CPT

## 2019-11-07 PROCEDURE — 87088 URINE BACTERIA CULTURE: CPT

## 2019-11-07 PROCEDURE — 2000000000 HC ICU R&B

## 2019-11-07 PROCEDURE — 96375 TX/PRO/DX INJ NEW DRUG ADDON: CPT

## 2019-11-07 PROCEDURE — 82962 GLUCOSE BLOOD TEST: CPT

## 2019-11-07 PROCEDURE — 6370000000 HC RX 637 (ALT 250 FOR IP): Performed by: FAMILY MEDICINE

## 2019-11-07 PROCEDURE — 96376 TX/PRO/DX INJ SAME DRUG ADON: CPT

## 2019-11-07 PROCEDURE — 84100 ASSAY OF PHOSPHORUS: CPT

## 2019-11-07 PROCEDURE — 83036 HEMOGLOBIN GLYCOSYLATED A1C: CPT

## 2019-11-07 PROCEDURE — 6370000000 HC RX 637 (ALT 250 FOR IP): Performed by: INTERNAL MEDICINE

## 2019-11-07 PROCEDURE — 82010 KETONE BODYS QUAN: CPT

## 2019-11-07 PROCEDURE — 80076 HEPATIC FUNCTION PANEL: CPT

## 2019-11-07 PROCEDURE — 2500000003 HC RX 250 WO HCPCS: Performed by: INTERNAL MEDICINE

## 2019-11-07 PROCEDURE — 2580000003 HC RX 258: Performed by: INTERNAL MEDICINE

## 2019-11-07 PROCEDURE — 6360000002 HC RX W HCPCS: Performed by: FAMILY MEDICINE

## 2019-11-07 PROCEDURE — 6360000002 HC RX W HCPCS: Performed by: INTERNAL MEDICINE

## 2019-11-07 PROCEDURE — 87186 SC STD MICRODIL/AGAR DIL: CPT

## 2019-11-07 PROCEDURE — 83735 ASSAY OF MAGNESIUM: CPT

## 2019-11-07 PROCEDURE — 71045 X-RAY EXAM CHEST 1 VIEW: CPT

## 2019-11-07 PROCEDURE — 05HM33Z INSERTION OF INFUSION DEVICE INTO RIGHT INTERNAL JUGULAR VEIN, PERCUTANEOUS APPROACH: ICD-10-PCS | Performed by: INTERNAL MEDICINE

## 2019-11-07 PROCEDURE — 87077 CULTURE AEROBIC IDENTIFY: CPT

## 2019-11-07 PROCEDURE — 97530 THERAPEUTIC ACTIVITIES: CPT

## 2019-11-07 PROCEDURE — 36415 COLL VENOUS BLD VENIPUNCTURE: CPT

## 2019-11-07 PROCEDURE — 97161 PT EVAL LOW COMPLEX 20 MIN: CPT

## 2019-11-07 PROCEDURE — 83605 ASSAY OF LACTIC ACID: CPT

## 2019-11-07 PROCEDURE — 87081 CULTURE SCREEN ONLY: CPT

## 2019-11-07 PROCEDURE — 84484 ASSAY OF TROPONIN QUANT: CPT

## 2019-11-07 PROCEDURE — 93010 ELECTROCARDIOGRAM REPORT: CPT | Performed by: INTERNAL MEDICINE

## 2019-11-07 PROCEDURE — 2700000000 HC OXYGEN THERAPY PER DAY

## 2019-11-07 PROCEDURE — 2580000003 HC RX 258: Performed by: STUDENT IN AN ORGANIZED HEALTH CARE EDUCATION/TRAINING PROGRAM

## 2019-11-07 PROCEDURE — 83930 ASSAY OF BLOOD OSMOLALITY: CPT

## 2019-11-07 PROCEDURE — 80048 BASIC METABOLIC PNL TOTAL CA: CPT

## 2019-11-07 RX ORDER — PANTOPRAZOLE SODIUM 40 MG/10ML
40 INJECTION, POWDER, LYOPHILIZED, FOR SOLUTION INTRAVENOUS DAILY
Status: DISCONTINUED | OUTPATIENT
Start: 2019-11-07 | End: 2019-11-12 | Stop reason: HOSPADM

## 2019-11-07 RX ORDER — INSULIN GLARGINE 100 [IU]/ML
10 INJECTION, SOLUTION SUBCUTANEOUS 2 TIMES DAILY
Status: DISCONTINUED | OUTPATIENT
Start: 2019-11-07 | End: 2019-11-12 | Stop reason: HOSPADM

## 2019-11-07 RX ORDER — POTASSIUM CHLORIDE 29.8 MG/ML
40 INJECTION INTRAVENOUS ONCE
Status: COMPLETED | OUTPATIENT
Start: 2019-11-07 | End: 2019-11-07

## 2019-11-07 RX ORDER — MAGNESIUM SULFATE 1 G/100ML
1 INJECTION INTRAVENOUS PRN
Status: DISCONTINUED | OUTPATIENT
Start: 2019-11-07 | End: 2019-11-12 | Stop reason: HOSPADM

## 2019-11-07 RX ORDER — POTASSIUM CHLORIDE 29.8 MG/ML
20 INJECTION INTRAVENOUS PRN
Status: DISCONTINUED | OUTPATIENT
Start: 2019-11-07 | End: 2019-11-08

## 2019-11-07 RX ORDER — MAGNESIUM SULFATE IN WATER 40 MG/ML
2 INJECTION, SOLUTION INTRAVENOUS ONCE
Status: COMPLETED | OUTPATIENT
Start: 2019-11-07 | End: 2019-11-08

## 2019-11-07 RX ORDER — LEVOTHYROXINE SODIUM ANHYDROUS 100 UG/5ML
25 INJECTION, POWDER, LYOPHILIZED, FOR SOLUTION INTRAVENOUS DAILY
Status: DISCONTINUED | OUTPATIENT
Start: 2019-11-07 | End: 2019-11-12 | Stop reason: HOSPADM

## 2019-11-07 RX ORDER — POTASSIUM CHLORIDE 7.45 MG/ML
20 INJECTION INTRAVENOUS PRN
Status: DISCONTINUED | OUTPATIENT
Start: 2019-11-07 | End: 2019-11-07 | Stop reason: ALTCHOICE

## 2019-11-07 RX ORDER — SODIUM CHLORIDE 450 MG/100ML
INJECTION, SOLUTION INTRAVENOUS CONTINUOUS
Status: DISCONTINUED | OUTPATIENT
Start: 2019-11-07 | End: 2019-11-07

## 2019-11-07 RX ORDER — ACETAMINOPHEN 325 MG/1
650 TABLET ORAL EVERY 4 HOURS PRN
Status: DISCONTINUED | OUTPATIENT
Start: 2019-11-07 | End: 2019-11-12 | Stop reason: HOSPADM

## 2019-11-07 RX ORDER — 0.9 % SODIUM CHLORIDE 0.9 %
500 INTRAVENOUS SOLUTION INTRAVENOUS ONCE
Status: COMPLETED | OUTPATIENT
Start: 2019-11-07 | End: 2019-11-07

## 2019-11-07 RX ORDER — ONDANSETRON 2 MG/ML
4 INJECTION INTRAMUSCULAR; INTRAVENOUS EVERY 6 HOURS PRN
Status: DISCONTINUED | OUTPATIENT
Start: 2019-11-07 | End: 2019-11-12 | Stop reason: HOSPADM

## 2019-11-07 RX ORDER — POTASSIUM CHLORIDE 29.8 MG/ML
20 INJECTION INTRAVENOUS PRN
Status: DISCONTINUED | OUTPATIENT
Start: 2019-11-07 | End: 2019-11-07 | Stop reason: SDUPTHER

## 2019-11-07 RX ORDER — 0.9 % SODIUM CHLORIDE 0.9 %
500 INTRAVENOUS SOLUTION INTRAVENOUS ONCE
Status: DISCONTINUED | OUTPATIENT
Start: 2019-11-07 | End: 2019-11-07 | Stop reason: SDUPTHER

## 2019-11-07 RX ORDER — SODIUM CHLORIDE 0.9 % (FLUSH) 0.9 %
10 SYRINGE (ML) INJECTION PRN
Status: DISCONTINUED | OUTPATIENT
Start: 2019-11-07 | End: 2019-11-12 | Stop reason: HOSPADM

## 2019-11-07 RX ORDER — SODIUM CHLORIDE 9 MG/ML
INJECTION, SOLUTION INTRAVENOUS CONTINUOUS
Status: DISCONTINUED | OUTPATIENT
Start: 2019-11-07 | End: 2019-11-07

## 2019-11-07 RX ORDER — DEXTROSE MONOHYDRATE 25 G/50ML
12.5 INJECTION, SOLUTION INTRAVENOUS PRN
Status: DISCONTINUED | OUTPATIENT
Start: 2019-11-07 | End: 2019-11-12 | Stop reason: HOSPADM

## 2019-11-07 RX ORDER — DEXTROSE AND SODIUM CHLORIDE 5; .45 G/100ML; G/100ML
INJECTION, SOLUTION INTRAVENOUS CONTINUOUS PRN
Status: DISCONTINUED | OUTPATIENT
Start: 2019-11-07 | End: 2019-11-12 | Stop reason: HOSPADM

## 2019-11-07 RX ORDER — SODIUM CHLORIDE 0.9 % (FLUSH) 0.9 %
10 SYRINGE (ML) INJECTION EVERY 12 HOURS SCHEDULED
Status: DISCONTINUED | OUTPATIENT
Start: 2019-11-07 | End: 2019-11-12 | Stop reason: HOSPADM

## 2019-11-07 RX ADMIN — Medication 10 ML: at 16:11

## 2019-11-07 RX ADMIN — POTASSIUM CHLORIDE 40 MEQ: 29.8 INJECTION, SOLUTION INTRAVENOUS at 06:47

## 2019-11-07 RX ADMIN — BUDESONIDE 1000 MCG: 0.5 SUSPENSION RESPIRATORY (INHALATION) at 21:11

## 2019-11-07 RX ADMIN — IPRATROPIUM BROMIDE AND ALBUTEROL SULFATE 1 AMPULE: .5; 3 SOLUTION RESPIRATORY (INHALATION) at 06:24

## 2019-11-07 RX ADMIN — PANTOPRAZOLE SODIUM 40 MG: 40 INJECTION, POWDER, FOR SOLUTION INTRAVENOUS at 08:12

## 2019-11-07 RX ADMIN — FORMOTEROL FUMARATE DIHYDRATE 20 MCG: 20 SOLUTION RESPIRATORY (INHALATION) at 21:11

## 2019-11-07 RX ADMIN — DEXTROSE AND SODIUM CHLORIDE: 5; 450 INJECTION, SOLUTION INTRAVENOUS at 06:14

## 2019-11-07 RX ADMIN — IPRATROPIUM BROMIDE AND ALBUTEROL SULFATE 1 AMPULE: .5; 3 SOLUTION RESPIRATORY (INHALATION) at 10:56

## 2019-11-07 RX ADMIN — IPRATROPIUM BROMIDE AND ALBUTEROL SULFATE 1 AMPULE: .5; 3 SOLUTION RESPIRATORY (INHALATION) at 21:11

## 2019-11-07 RX ADMIN — Medication 10 ML: at 21:00

## 2019-11-07 RX ADMIN — DEXTROSE AND SODIUM CHLORIDE: 5; 450 INJECTION, SOLUTION INTRAVENOUS at 19:24

## 2019-11-07 RX ADMIN — INSULIN GLARGINE 10 UNITS: 100 INJECTION, SOLUTION SUBCUTANEOUS at 22:16

## 2019-11-07 RX ADMIN — ENOXAPARIN SODIUM 30 MG: 30 INJECTION SUBCUTANEOUS at 08:12

## 2019-11-07 RX ADMIN — INSULIN LISPRO 1 UNITS: 100 INJECTION, SOLUTION INTRAVENOUS; SUBCUTANEOUS at 22:16

## 2019-11-07 RX ADMIN — SODIUM CHLORIDE: 4.5 INJECTION, SOLUTION INTRAVENOUS at 03:16

## 2019-11-07 RX ADMIN — INSULIN LISPRO 1 UNITS: 100 INJECTION, SOLUTION INTRAVENOUS; SUBCUTANEOUS at 18:14

## 2019-11-07 RX ADMIN — Medication 10 ML: at 12:54

## 2019-11-07 RX ADMIN — INSULIN LISPRO 2 UNITS: 100 INJECTION, SOLUTION INTRAVENOUS; SUBCUTANEOUS at 17:09

## 2019-11-07 RX ADMIN — LEVOTHYROXINE SODIUM 25 MCG: 100 INJECTION, POWDER, LYOPHILIZED, FOR SOLUTION INTRAVENOUS at 11:44

## 2019-11-07 RX ADMIN — Medication 10 ML: at 08:12

## 2019-11-07 RX ADMIN — DEXTROSE AND SODIUM CHLORIDE: 5; 450 INJECTION, SOLUTION INTRAVENOUS at 13:00

## 2019-11-07 RX ADMIN — IPRATROPIUM BROMIDE AND ALBUTEROL SULFATE 1 AMPULE: .5; 3 SOLUTION RESPIRATORY (INHALATION) at 16:44

## 2019-11-07 RX ADMIN — ONDANSETRON 4 MG: 2 INJECTION INTRAMUSCULAR; INTRAVENOUS at 20:29

## 2019-11-07 RX ADMIN — Medication 10 ML: at 08:16

## 2019-11-07 RX ADMIN — PREDNISOLONE ACETATE 1 DROP: 10 SUSPENSION/ DROPS OPHTHALMIC at 10:26

## 2019-11-07 RX ADMIN — MAGNESIUM SULFATE HEPTAHYDRATE 2 G: 40 INJECTION, SOLUTION INTRAVENOUS at 22:16

## 2019-11-07 RX ADMIN — METHYLPREDNISOLONE SODIUM SUCCINATE 40 MG: 40 INJECTION, POWDER, FOR SOLUTION INTRAMUSCULAR; INTRAVENOUS at 06:21

## 2019-11-07 RX ADMIN — SODIUM CHLORIDE 3.72 UNITS/HR: 9 INJECTION, SOLUTION INTRAVENOUS at 03:24

## 2019-11-07 RX ADMIN — SODIUM CHLORIDE: 9 INJECTION, SOLUTION INTRAVENOUS at 02:00

## 2019-11-07 RX ADMIN — FORMOTEROL FUMARATE DIHYDRATE 20 MCG: 20 SOLUTION RESPIRATORY (INHALATION) at 06:24

## 2019-11-07 RX ADMIN — POTASSIUM PHOSPHATE, MONOBASIC AND POTASSIUM PHOSPHATE, DIBASIC 20 MMOL: 224; 236 INJECTION, SOLUTION, CONCENTRATE INTRAVENOUS at 07:25

## 2019-11-07 RX ADMIN — BUDESONIDE 1000 MCG: 0.5 SUSPENSION RESPIRATORY (INHALATION) at 06:24

## 2019-11-07 ASSESSMENT — PAIN SCALES - GENERAL
PAINLEVEL_OUTOF10: 0

## 2019-11-08 ENCOUNTER — APPOINTMENT (OUTPATIENT)
Dept: GENERAL RADIOLOGY | Age: 84
DRG: 189 | End: 2019-11-08
Payer: MEDICARE

## 2019-11-08 LAB
ALBUMIN SERPL-MCNC: 3.3 G/DL (ref 3.5–5.2)
ALP BLD-CCNC: 75 U/L (ref 35–104)
ALT SERPL-CCNC: 9 U/L (ref 0–32)
ANION GAP SERPL CALCULATED.3IONS-SCNC: 11 MMOL/L (ref 7–16)
ANION GAP SERPL CALCULATED.3IONS-SCNC: 9 MMOL/L (ref 7–16)
AST SERPL-CCNC: 13 U/L (ref 0–31)
BILIRUB SERPL-MCNC: 0.2 MG/DL (ref 0–1.2)
BILIRUBIN DIRECT: <0.2 MG/DL (ref 0–0.3)
BILIRUBIN, INDIRECT: ABNORMAL MG/DL (ref 0–1)
BUN BLDV-MCNC: 10 MG/DL (ref 8–23)
BUN BLDV-MCNC: 11 MG/DL (ref 8–23)
CALCIUM SERPL-MCNC: 8 MG/DL (ref 8.6–10.2)
CALCIUM SERPL-MCNC: 8.8 MG/DL (ref 8.6–10.2)
CHLORIDE BLD-SCNC: 103 MMOL/L (ref 98–107)
CHLORIDE BLD-SCNC: 103 MMOL/L (ref 98–107)
CO2: 29 MMOL/L (ref 22–29)
CO2: 30 MMOL/L (ref 22–29)
CREAT SERPL-MCNC: 0.3 MG/DL (ref 0.5–1)
CREAT SERPL-MCNC: 0.4 MG/DL (ref 0.5–1)
GFR AFRICAN AMERICAN: >60
GFR AFRICAN AMERICAN: >60
GFR NON-AFRICAN AMERICAN: >60 ML/MIN/1.73
GFR NON-AFRICAN AMERICAN: >60 ML/MIN/1.73
GLUCOSE BLD-MCNC: 10 MG/DL (ref 74–99)
GLUCOSE BLD-MCNC: 214 MG/DL (ref 74–99)
HCT VFR BLD CALC: 38.2 % (ref 34–48)
HEMOGLOBIN: 11.7 G/DL (ref 11.5–15.5)
MAGNESIUM: 2.3 MG/DL (ref 1.6–2.6)
MCH RBC QN AUTO: 30.6 PG (ref 26–35)
MCHC RBC AUTO-ENTMCNC: 30.6 % (ref 32–34.5)
MCV RBC AUTO: 100 FL (ref 80–99.9)
METER GLUCOSE: 104 MG/DL (ref 74–99)
METER GLUCOSE: 116 MG/DL (ref 74–99)
METER GLUCOSE: 128 MG/DL (ref 74–99)
METER GLUCOSE: 181 MG/DL (ref 74–99)
METER GLUCOSE: 199 MG/DL (ref 74–99)
METER GLUCOSE: 218 MG/DL (ref 74–99)
METER GLUCOSE: 77 MG/DL (ref 74–99)
METER GLUCOSE: 94 MG/DL (ref 74–99)
METER GLUCOSE: 97 MG/DL (ref 74–99)
METER GLUCOSE: <40 MG/DL (ref 74–99)
ORGANISM: ABNORMAL
PDW BLD-RTO: 13.5 FL (ref 11.5–15)
PHOSPHORUS: 2.6 MG/DL (ref 2.5–4.5)
PLATELET # BLD: 207 E9/L (ref 130–450)
PMV BLD AUTO: 11.1 FL (ref 7–12)
POTASSIUM SERPL-SCNC: 3.5 MMOL/L (ref 3.5–5)
POTASSIUM SERPL-SCNC: 3.6 MMOL/L (ref 3.5–5)
RBC # BLD: 3.82 E12/L (ref 3.5–5.5)
SODIUM BLD-SCNC: 141 MMOL/L (ref 132–146)
SODIUM BLD-SCNC: 144 MMOL/L (ref 132–146)
TOTAL PROTEIN: 5.7 G/DL (ref 6.4–8.3)
WBC # BLD: 9.7 E9/L (ref 4.5–11.5)

## 2019-11-08 PROCEDURE — 84100 ASSAY OF PHOSPHORUS: CPT

## 2019-11-08 PROCEDURE — 94640 AIRWAY INHALATION TREATMENT: CPT

## 2019-11-08 PROCEDURE — 2580000003 HC RX 258: Performed by: INTERNAL MEDICINE

## 2019-11-08 PROCEDURE — 6360000002 HC RX W HCPCS: Performed by: INTERNAL MEDICINE

## 2019-11-08 PROCEDURE — 36592 COLLECT BLOOD FROM PICC: CPT

## 2019-11-08 PROCEDURE — 6370000000 HC RX 637 (ALT 250 FOR IP): Performed by: INTERNAL MEDICINE

## 2019-11-08 PROCEDURE — 99233 SBSQ HOSP IP/OBS HIGH 50: CPT | Performed by: INTERNAL MEDICINE

## 2019-11-08 PROCEDURE — 6360000002 HC RX W HCPCS: Performed by: FAMILY MEDICINE

## 2019-11-08 PROCEDURE — 6370000000 HC RX 637 (ALT 250 FOR IP): Performed by: FAMILY MEDICINE

## 2019-11-08 PROCEDURE — 85027 COMPLETE CBC AUTOMATED: CPT

## 2019-11-08 PROCEDURE — 80048 BASIC METABOLIC PNL TOTAL CA: CPT

## 2019-11-08 PROCEDURE — 2060000000 HC ICU INTERMEDIATE R&B

## 2019-11-08 PROCEDURE — G0008 ADMIN INFLUENZA VIRUS VAC: HCPCS | Performed by: FAMILY MEDICINE

## 2019-11-08 PROCEDURE — 36415 COLL VENOUS BLD VENIPUNCTURE: CPT

## 2019-11-08 PROCEDURE — 83735 ASSAY OF MAGNESIUM: CPT

## 2019-11-08 PROCEDURE — C9113 INJ PANTOPRAZOLE SODIUM, VIA: HCPCS | Performed by: INTERNAL MEDICINE

## 2019-11-08 PROCEDURE — 90653 IIV ADJUVANT VACCINE IM: CPT | Performed by: FAMILY MEDICINE

## 2019-11-08 PROCEDURE — 71045 X-RAY EXAM CHEST 1 VIEW: CPT

## 2019-11-08 PROCEDURE — 82962 GLUCOSE BLOOD TEST: CPT

## 2019-11-08 PROCEDURE — 2700000000 HC OXYGEN THERAPY PER DAY

## 2019-11-08 PROCEDURE — 2500000003 HC RX 250 WO HCPCS: Performed by: INTERNAL MEDICINE

## 2019-11-08 PROCEDURE — 80076 HEPATIC FUNCTION PANEL: CPT

## 2019-11-08 RX ORDER — FORMOTEROL FUMARATE 20 UG/2ML
20 SOLUTION RESPIRATORY (INHALATION) 2 TIMES DAILY
Qty: 240 ML | Refills: 0
Start: 2019-11-08 | End: 2021-01-01

## 2019-11-08 RX ORDER — POTASSIUM CHLORIDE 29.8 MG/ML
20 INJECTION INTRAVENOUS ONCE
Status: DISCONTINUED | OUTPATIENT
Start: 2019-11-08 | End: 2019-11-08

## 2019-11-08 RX ORDER — POTASSIUM CHLORIDE 29.8 MG/ML
20 INJECTION INTRAVENOUS
Status: COMPLETED | OUTPATIENT
Start: 2019-11-08 | End: 2019-11-08

## 2019-11-08 RX ORDER — 0.9 % SODIUM CHLORIDE 0.9 %
500 INTRAVENOUS SOLUTION INTRAVENOUS ONCE
Status: COMPLETED | OUTPATIENT
Start: 2019-11-08 | End: 2019-11-08

## 2019-11-08 RX ORDER — IPRATROPIUM BROMIDE AND ALBUTEROL SULFATE 2.5; .5 MG/3ML; MG/3ML
3 SOLUTION RESPIRATORY (INHALATION)
Qty: 360 ML | Refills: 0
Start: 2019-11-08 | End: 2021-01-01

## 2019-11-08 RX ORDER — BUDESONIDE 0.5 MG/2ML
1000 INHALANT ORAL 2 TIMES DAILY
Qty: 60 AMPULE | Refills: 3 | Status: SHIPPED | OUTPATIENT
Start: 2019-11-08 | End: 2021-01-01

## 2019-11-08 RX ADMIN — PREDNISOLONE ACETATE 1 DROP: 10 SUSPENSION/ DROPS OPHTHALMIC at 07:38

## 2019-11-08 RX ADMIN — POTASSIUM CHLORIDE 20 MEQ: 29.8 INJECTION, SOLUTION INTRAVENOUS at 07:39

## 2019-11-08 RX ADMIN — Medication 10 ML: at 22:00

## 2019-11-08 RX ADMIN — FORMOTEROL FUMARATE DIHYDRATE 20 MCG: 20 SOLUTION RESPIRATORY (INHALATION) at 20:49

## 2019-11-08 RX ADMIN — INSULIN LISPRO 1 UNITS: 100 INJECTION, SOLUTION INTRAVENOUS; SUBCUTANEOUS at 00:12

## 2019-11-08 RX ADMIN — PANTOPRAZOLE SODIUM 40 MG: 40 INJECTION, POWDER, FOR SOLUTION INTRAVENOUS at 07:38

## 2019-11-08 RX ADMIN — SODIUM CHLORIDE 500 ML: 9 INJECTION, SOLUTION INTRAVENOUS at 06:15

## 2019-11-08 RX ADMIN — INFLUENZA VACCINE, ADJUVANTED 0.5 ML: 15; 15; 15 INJECTION, SUSPENSION INTRAMUSCULAR at 17:56

## 2019-11-08 RX ADMIN — IPRATROPIUM BROMIDE AND ALBUTEROL SULFATE 1 AMPULE: .5; 3 SOLUTION RESPIRATORY (INHALATION) at 07:58

## 2019-11-08 RX ADMIN — BUDESONIDE 1000 MCG: 0.5 SUSPENSION RESPIRATORY (INHALATION) at 20:49

## 2019-11-08 RX ADMIN — DEXTROSE 50 % IN WATER (D50W) INTRAVENOUS SYRINGE 25 G: at 06:58

## 2019-11-08 RX ADMIN — INSULIN GLARGINE 10 UNITS: 100 INJECTION, SOLUTION SUBCUTANEOUS at 23:05

## 2019-11-08 RX ADMIN — IPRATROPIUM BROMIDE AND ALBUTEROL SULFATE 1 AMPULE: .5; 3 SOLUTION RESPIRATORY (INHALATION) at 11:16

## 2019-11-08 RX ADMIN — DEXTROSE 50 % IN WATER (D50W) INTRAVENOUS SYRINGE 12.5 G: at 10:37

## 2019-11-08 RX ADMIN — ACETAMINOPHEN 650 MG: 325 TABLET, FILM COATED ORAL at 03:08

## 2019-11-08 RX ADMIN — IPRATROPIUM BROMIDE AND ALBUTEROL SULFATE 1 AMPULE: .5; 3 SOLUTION RESPIRATORY (INHALATION) at 20:49

## 2019-11-08 RX ADMIN — LEVOTHYROXINE SODIUM 25 MCG: 100 INJECTION, POWDER, LYOPHILIZED, FOR SOLUTION INTRAVENOUS at 13:07

## 2019-11-08 RX ADMIN — Medication 10 ML: at 13:07

## 2019-11-08 RX ADMIN — Medication 10 ML: at 10:37

## 2019-11-08 RX ADMIN — ENOXAPARIN SODIUM 30 MG: 30 INJECTION SUBCUTANEOUS at 07:38

## 2019-11-08 RX ADMIN — CEFTRIAXONE SODIUM 1 G: 1 INJECTION, POWDER, FOR SOLUTION INTRAMUSCULAR; INTRAVENOUS at 15:54

## 2019-11-08 RX ADMIN — FORMOTEROL FUMARATE DIHYDRATE 20 MCG: 20 SOLUTION RESPIRATORY (INHALATION) at 07:58

## 2019-11-08 RX ADMIN — POTASSIUM CHLORIDE 20 MEQ: 29.8 INJECTION, SOLUTION INTRAVENOUS at 07:38

## 2019-11-08 RX ADMIN — BUDESONIDE 1000 MCG: 0.5 SUSPENSION RESPIRATORY (INHALATION) at 07:57

## 2019-11-08 ASSESSMENT — PAIN SCALES - GENERAL
PAINLEVEL_OUTOF10: 0
PAINLEVEL_OUTOF10: 8
PAINLEVEL_OUTOF10: 0

## 2019-11-08 ASSESSMENT — PAIN DESCRIPTION - FREQUENCY: FREQUENCY: INTERMITTENT

## 2019-11-08 ASSESSMENT — PAIN DESCRIPTION - LOCATION: LOCATION: GENERALIZED

## 2019-11-08 ASSESSMENT — PAIN DESCRIPTION - PAIN TYPE: TYPE: ACUTE PAIN

## 2019-11-09 LAB
ALBUMIN SERPL-MCNC: 3 G/DL (ref 3.5–5.2)
ALP BLD-CCNC: 100 U/L (ref 35–104)
ALT SERPL-CCNC: 16 U/L (ref 0–32)
ANION GAP SERPL CALCULATED.3IONS-SCNC: 11 MMOL/L (ref 7–16)
AST SERPL-CCNC: 29 U/L (ref 0–31)
BILIRUB SERPL-MCNC: 0.2 MG/DL (ref 0–1.2)
BILIRUBIN DIRECT: <0.2 MG/DL (ref 0–0.3)
BILIRUBIN, INDIRECT: ABNORMAL MG/DL (ref 0–1)
BUN BLDV-MCNC: 11 MG/DL (ref 8–23)
CALCIUM SERPL-MCNC: 8.4 MG/DL (ref 8.6–10.2)
CHLORIDE BLD-SCNC: 99 MMOL/L (ref 98–107)
CO2: 30 MMOL/L (ref 22–29)
CREAT SERPL-MCNC: 0.4 MG/DL (ref 0.5–1)
GFR AFRICAN AMERICAN: >60
GFR NON-AFRICAN AMERICAN: >60 ML/MIN/1.73
GLUCOSE BLD-MCNC: 278 MG/DL (ref 74–99)
HCT VFR BLD CALC: 36.5 % (ref 34–48)
HEMOGLOBIN: 10.9 G/DL (ref 11.5–15.5)
MAGNESIUM: 2.1 MG/DL (ref 1.6–2.6)
MCH RBC QN AUTO: 30.6 PG (ref 26–35)
MCHC RBC AUTO-ENTMCNC: 29.9 % (ref 32–34.5)
MCV RBC AUTO: 102.5 FL (ref 80–99.9)
METER GLUCOSE: 161 MG/DL (ref 74–99)
METER GLUCOSE: 179 MG/DL (ref 74–99)
METER GLUCOSE: 283 MG/DL (ref 74–99)
METER GLUCOSE: 322 MG/DL (ref 74–99)
METER GLUCOSE: 66 MG/DL (ref 74–99)
METER GLUCOSE: 94 MG/DL (ref 74–99)
PDW BLD-RTO: 13.6 FL (ref 11.5–15)
PHOSPHORUS: 2.5 MG/DL (ref 2.5–4.5)
PLATELET # BLD: 136 E9/L (ref 130–450)
PMV BLD AUTO: 10.7 FL (ref 7–12)
POTASSIUM SERPL-SCNC: 4.9 MMOL/L (ref 3.5–5)
RBC # BLD: 3.56 E12/L (ref 3.5–5.5)
SODIUM BLD-SCNC: 140 MMOL/L (ref 132–146)
TOTAL PROTEIN: 5.5 G/DL (ref 6.4–8.3)
WBC # BLD: 4.9 E9/L (ref 4.5–11.5)

## 2019-11-09 PROCEDURE — 6370000000 HC RX 637 (ALT 250 FOR IP): Performed by: INTERNAL MEDICINE

## 2019-11-09 PROCEDURE — C9113 INJ PANTOPRAZOLE SODIUM, VIA: HCPCS | Performed by: INTERNAL MEDICINE

## 2019-11-09 PROCEDURE — 82962 GLUCOSE BLOOD TEST: CPT

## 2019-11-09 PROCEDURE — 2500000003 HC RX 250 WO HCPCS: Performed by: INTERNAL MEDICINE

## 2019-11-09 PROCEDURE — 2580000003 HC RX 258: Performed by: INTERNAL MEDICINE

## 2019-11-09 PROCEDURE — 6360000002 HC RX W HCPCS: Performed by: INTERNAL MEDICINE

## 2019-11-09 PROCEDURE — 85027 COMPLETE CBC AUTOMATED: CPT

## 2019-11-09 PROCEDURE — 80076 HEPATIC FUNCTION PANEL: CPT

## 2019-11-09 PROCEDURE — 84100 ASSAY OF PHOSPHORUS: CPT

## 2019-11-09 PROCEDURE — 36415 COLL VENOUS BLD VENIPUNCTURE: CPT

## 2019-11-09 PROCEDURE — 2700000000 HC OXYGEN THERAPY PER DAY

## 2019-11-09 PROCEDURE — 80048 BASIC METABOLIC PNL TOTAL CA: CPT

## 2019-11-09 PROCEDURE — 2060000000 HC ICU INTERMEDIATE R&B

## 2019-11-09 PROCEDURE — 6360000002 HC RX W HCPCS: Performed by: CLINICAL NURSE SPECIALIST

## 2019-11-09 PROCEDURE — 83735 ASSAY OF MAGNESIUM: CPT

## 2019-11-09 PROCEDURE — 94640 AIRWAY INHALATION TREATMENT: CPT

## 2019-11-09 RX ORDER — FUROSEMIDE 10 MG/ML
40 INJECTION INTRAMUSCULAR; INTRAVENOUS ONCE
Status: COMPLETED | OUTPATIENT
Start: 2019-11-09 | End: 2019-11-09

## 2019-11-09 RX ADMIN — ENOXAPARIN SODIUM 30 MG: 30 INJECTION SUBCUTANEOUS at 08:38

## 2019-11-09 RX ADMIN — IPRATROPIUM BROMIDE AND ALBUTEROL SULFATE 1 AMPULE: .5; 3 SOLUTION RESPIRATORY (INHALATION) at 20:35

## 2019-11-09 RX ADMIN — INSULIN GLARGINE 10 UNITS: 100 INJECTION, SOLUTION SUBCUTANEOUS at 08:39

## 2019-11-09 RX ADMIN — MUPIROCIN: 20 OINTMENT TOPICAL at 08:39

## 2019-11-09 RX ADMIN — BUDESONIDE 1000 MCG: 0.5 SUSPENSION RESPIRATORY (INHALATION) at 10:01

## 2019-11-09 RX ADMIN — FORMOTEROL FUMARATE DIHYDRATE 20 MCG: 20 SOLUTION RESPIRATORY (INHALATION) at 10:01

## 2019-11-09 RX ADMIN — PREDNISOLONE ACETATE 1 DROP: 10 SUSPENSION/ DROPS OPHTHALMIC at 08:38

## 2019-11-09 RX ADMIN — IPRATROPIUM BROMIDE AND ALBUTEROL SULFATE 1 AMPULE: .5; 3 SOLUTION RESPIRATORY (INHALATION) at 15:44

## 2019-11-09 RX ADMIN — FORMOTEROL FUMARATE DIHYDRATE 20 MCG: 20 SOLUTION RESPIRATORY (INHALATION) at 20:35

## 2019-11-09 RX ADMIN — Medication 10 ML: at 08:39

## 2019-11-09 RX ADMIN — IPRATROPIUM BROMIDE AND ALBUTEROL SULFATE 1 AMPULE: .5; 3 SOLUTION RESPIRATORY (INHALATION) at 10:02

## 2019-11-09 RX ADMIN — INSULIN LISPRO 2 UNITS: 100 INJECTION, SOLUTION INTRAVENOUS; SUBCUTANEOUS at 12:38

## 2019-11-09 RX ADMIN — INSULIN GLARGINE 10 UNITS: 100 INJECTION, SOLUTION SUBCUTANEOUS at 21:48

## 2019-11-09 RX ADMIN — INSULIN LISPRO 1 UNITS: 100 INJECTION, SOLUTION INTRAVENOUS; SUBCUTANEOUS at 21:48

## 2019-11-09 RX ADMIN — CEFTRIAXONE SODIUM 1 G: 1 INJECTION, POWDER, FOR SOLUTION INTRAMUSCULAR; INTRAVENOUS at 16:53

## 2019-11-09 RX ADMIN — PANTOPRAZOLE SODIUM 40 MG: 40 INJECTION, POWDER, FOR SOLUTION INTRAVENOUS at 08:38

## 2019-11-09 RX ADMIN — INSULIN LISPRO 1 UNITS: 100 INJECTION, SOLUTION INTRAVENOUS; SUBCUTANEOUS at 12:34

## 2019-11-09 RX ADMIN — INSULIN LISPRO 4 UNITS: 100 INJECTION, SOLUTION INTRAVENOUS; SUBCUTANEOUS at 06:57

## 2019-11-09 RX ADMIN — LEVOTHYROXINE SODIUM 25 MCG: 100 INJECTION, POWDER, LYOPHILIZED, FOR SOLUTION INTRAVENOUS at 08:38

## 2019-11-09 RX ADMIN — IPRATROPIUM BROMIDE AND ALBUTEROL SULFATE 1 AMPULE: .5; 3 SOLUTION RESPIRATORY (INHALATION) at 13:37

## 2019-11-09 RX ADMIN — BUDESONIDE 1000 MCG: 0.5 SUSPENSION RESPIRATORY (INHALATION) at 20:35

## 2019-11-09 RX ADMIN — INSULIN LISPRO 2 UNITS: 100 INJECTION, SOLUTION INTRAVENOUS; SUBCUTANEOUS at 06:55

## 2019-11-09 RX ADMIN — FUROSEMIDE 40 MG: 10 INJECTION, SOLUTION INTRAMUSCULAR; INTRAVENOUS at 12:38

## 2019-11-09 RX ADMIN — Medication 15 G: at 16:55

## 2019-11-09 RX ADMIN — MUPIROCIN: 20 OINTMENT TOPICAL at 21:50

## 2019-11-09 ASSESSMENT — PAIN SCALES - GENERAL
PAINLEVEL_OUTOF10: 0

## 2019-11-09 ASSESSMENT — PAIN DESCRIPTION - ONSET: ONSET: ON-GOING

## 2019-11-09 ASSESSMENT — PAIN DESCRIPTION - FREQUENCY: FREQUENCY: INTERMITTENT

## 2019-11-09 ASSESSMENT — PAIN DESCRIPTION - ORIENTATION: ORIENTATION: RIGHT;LEFT;MID

## 2019-11-09 ASSESSMENT — PAIN DESCRIPTION - PAIN TYPE: TYPE: ACUTE PAIN

## 2019-11-09 ASSESSMENT — PAIN DESCRIPTION - DESCRIPTORS: DESCRIPTORS: ACHING;CONSTANT

## 2019-11-09 ASSESSMENT — PAIN - FUNCTIONAL ASSESSMENT: PAIN_FUNCTIONAL_ASSESSMENT: PREVENTS OR INTERFERES SOME ACTIVE ACTIVITIES AND ADLS

## 2019-11-09 ASSESSMENT — PAIN DESCRIPTION - PROGRESSION: CLINICAL_PROGRESSION: NOT CHANGED

## 2019-11-09 ASSESSMENT — PAIN DESCRIPTION - LOCATION: LOCATION: GENERALIZED

## 2019-11-10 PROBLEM — E43 SEVERE PROTEIN-CALORIE MALNUTRITION (HCC): Chronic | Status: ACTIVE | Noted: 2019-11-10

## 2019-11-10 LAB
ALBUMIN SERPL-MCNC: 3 G/DL (ref 3.5–5.2)
ALP BLD-CCNC: 88 U/L (ref 35–104)
ALT SERPL-CCNC: 13 U/L (ref 0–32)
ANION GAP SERPL CALCULATED.3IONS-SCNC: 10 MMOL/L (ref 7–16)
AST SERPL-CCNC: 17 U/L (ref 0–31)
BILIRUB SERPL-MCNC: 0.3 MG/DL (ref 0–1.2)
BILIRUBIN DIRECT: <0.2 MG/DL (ref 0–0.3)
BILIRUBIN, INDIRECT: ABNORMAL MG/DL (ref 0–1)
BUN BLDV-MCNC: 10 MG/DL (ref 8–23)
CALCIUM SERPL-MCNC: 8.5 MG/DL (ref 8.6–10.2)
CHLORIDE BLD-SCNC: 94 MMOL/L (ref 98–107)
CO2: 34 MMOL/L (ref 22–29)
CREAT SERPL-MCNC: 0.4 MG/DL (ref 0.5–1)
GFR AFRICAN AMERICAN: >60
GFR NON-AFRICAN AMERICAN: >60 ML/MIN/1.73
GLUCOSE BLD-MCNC: 199 MG/DL (ref 74–99)
HCT VFR BLD CALC: 36.5 % (ref 34–48)
HEMOGLOBIN: 11.3 G/DL (ref 11.5–15.5)
MAGNESIUM: 2.2 MG/DL (ref 1.6–2.6)
MCH RBC QN AUTO: 30.9 PG (ref 26–35)
MCHC RBC AUTO-ENTMCNC: 31 % (ref 32–34.5)
MCV RBC AUTO: 99.7 FL (ref 80–99.9)
METER GLUCOSE: 103 MG/DL (ref 74–99)
METER GLUCOSE: 175 MG/DL (ref 74–99)
METER GLUCOSE: 200 MG/DL (ref 74–99)
METER GLUCOSE: 249 MG/DL (ref 74–99)
ORGANISM: ABNORMAL
PDW BLD-RTO: 13.7 FL (ref 11.5–15)
PHOSPHORUS: 4 MG/DL (ref 2.5–4.5)
PLATELET # BLD: 164 E9/L (ref 130–450)
PMV BLD AUTO: 10.7 FL (ref 7–12)
POTASSIUM SERPL-SCNC: 4.7 MMOL/L (ref 3.5–5)
RBC # BLD: 3.66 E12/L (ref 3.5–5.5)
SODIUM BLD-SCNC: 138 MMOL/L (ref 132–146)
TOTAL PROTEIN: 5.3 G/DL (ref 6.4–8.3)
URINE CULTURE, ROUTINE: ABNORMAL
WBC # BLD: 5.8 E9/L (ref 4.5–11.5)

## 2019-11-10 PROCEDURE — 36415 COLL VENOUS BLD VENIPUNCTURE: CPT

## 2019-11-10 PROCEDURE — 6360000002 HC RX W HCPCS: Performed by: INTERNAL MEDICINE

## 2019-11-10 PROCEDURE — 2580000003 HC RX 258: Performed by: INTERNAL MEDICINE

## 2019-11-10 PROCEDURE — 80048 BASIC METABOLIC PNL TOTAL CA: CPT

## 2019-11-10 PROCEDURE — 80076 HEPATIC FUNCTION PANEL: CPT

## 2019-11-10 PROCEDURE — 2060000000 HC ICU INTERMEDIATE R&B

## 2019-11-10 PROCEDURE — 2500000003 HC RX 250 WO HCPCS: Performed by: INTERNAL MEDICINE

## 2019-11-10 PROCEDURE — 84100 ASSAY OF PHOSPHORUS: CPT

## 2019-11-10 PROCEDURE — 6370000000 HC RX 637 (ALT 250 FOR IP): Performed by: INTERNAL MEDICINE

## 2019-11-10 PROCEDURE — 82962 GLUCOSE BLOOD TEST: CPT

## 2019-11-10 PROCEDURE — 94640 AIRWAY INHALATION TREATMENT: CPT

## 2019-11-10 PROCEDURE — 85027 COMPLETE CBC AUTOMATED: CPT

## 2019-11-10 PROCEDURE — C9113 INJ PANTOPRAZOLE SODIUM, VIA: HCPCS | Performed by: INTERNAL MEDICINE

## 2019-11-10 PROCEDURE — 2700000000 HC OXYGEN THERAPY PER DAY

## 2019-11-10 PROCEDURE — 83735 ASSAY OF MAGNESIUM: CPT

## 2019-11-10 RX ADMIN — INSULIN LISPRO 2 UNITS: 100 INJECTION, SOLUTION INTRAVENOUS; SUBCUTANEOUS at 11:57

## 2019-11-10 RX ADMIN — INSULIN LISPRO 2 UNITS: 100 INJECTION, SOLUTION INTRAVENOUS; SUBCUTANEOUS at 06:42

## 2019-11-10 RX ADMIN — MUPIROCIN: 20 OINTMENT TOPICAL at 20:41

## 2019-11-10 RX ADMIN — CEFTRIAXONE SODIUM 1 G: 1 INJECTION, POWDER, FOR SOLUTION INTRAMUSCULAR; INTRAVENOUS at 16:32

## 2019-11-10 RX ADMIN — INSULIN LISPRO 2 UNITS: 100 INJECTION, SOLUTION INTRAVENOUS; SUBCUTANEOUS at 11:55

## 2019-11-10 RX ADMIN — Medication 10 ML: at 20:45

## 2019-11-10 RX ADMIN — FORMOTEROL FUMARATE DIHYDRATE 20 MCG: 20 SOLUTION RESPIRATORY (INHALATION) at 08:24

## 2019-11-10 RX ADMIN — IPRATROPIUM BROMIDE AND ALBUTEROL SULFATE 1 AMPULE: .5; 3 SOLUTION RESPIRATORY (INHALATION) at 08:23

## 2019-11-10 RX ADMIN — IPRATROPIUM BROMIDE AND ALBUTEROL SULFATE 1 AMPULE: .5; 3 SOLUTION RESPIRATORY (INHALATION) at 12:13

## 2019-11-10 RX ADMIN — BUDESONIDE 1000 MCG: 0.5 SUSPENSION RESPIRATORY (INHALATION) at 08:23

## 2019-11-10 RX ADMIN — PREDNISOLONE ACETATE 1 DROP: 10 SUSPENSION/ DROPS OPHTHALMIC at 10:29

## 2019-11-10 RX ADMIN — INSULIN LISPRO 2 UNITS: 100 INJECTION, SOLUTION INTRAVENOUS; SUBCUTANEOUS at 06:43

## 2019-11-10 RX ADMIN — IPRATROPIUM BROMIDE AND ALBUTEROL SULFATE 1 AMPULE: .5; 3 SOLUTION RESPIRATORY (INHALATION) at 16:01

## 2019-11-10 RX ADMIN — INSULIN GLARGINE 10 UNITS: 100 INJECTION, SOLUTION SUBCUTANEOUS at 20:43

## 2019-11-10 RX ADMIN — MUPIROCIN: 20 OINTMENT TOPICAL at 10:29

## 2019-11-10 RX ADMIN — INSULIN LISPRO 1 UNITS: 100 INJECTION, SOLUTION INTRAVENOUS; SUBCUTANEOUS at 20:43

## 2019-11-10 RX ADMIN — Medication 10 ML: at 10:30

## 2019-11-10 RX ADMIN — INSULIN GLARGINE 10 UNITS: 100 INJECTION, SOLUTION SUBCUTANEOUS at 10:31

## 2019-11-10 RX ADMIN — IPRATROPIUM BROMIDE AND ALBUTEROL SULFATE 1 AMPULE: .5; 3 SOLUTION RESPIRATORY (INHALATION) at 19:31

## 2019-11-10 RX ADMIN — ENOXAPARIN SODIUM 30 MG: 30 INJECTION SUBCUTANEOUS at 10:30

## 2019-11-10 RX ADMIN — BUDESONIDE 1000 MCG: 0.5 SUSPENSION RESPIRATORY (INHALATION) at 19:31

## 2019-11-10 RX ADMIN — LEVOTHYROXINE SODIUM 25 MCG: 100 INJECTION, POWDER, LYOPHILIZED, FOR SOLUTION INTRAVENOUS at 10:29

## 2019-11-10 RX ADMIN — PANTOPRAZOLE SODIUM 40 MG: 40 INJECTION, POWDER, FOR SOLUTION INTRAVENOUS at 10:29

## 2019-11-10 RX ADMIN — FORMOTEROL FUMARATE DIHYDRATE 20 MCG: 20 SOLUTION RESPIRATORY (INHALATION) at 19:31

## 2019-11-10 ASSESSMENT — PAIN SCALES - GENERAL
PAINLEVEL_OUTOF10: 0

## 2019-11-11 LAB
ALBUMIN SERPL-MCNC: 3 G/DL (ref 3.5–5.2)
ALP BLD-CCNC: 81 U/L (ref 35–104)
ALT SERPL-CCNC: 11 U/L (ref 0–32)
ANION GAP SERPL CALCULATED.3IONS-SCNC: 10 MMOL/L (ref 7–16)
AST SERPL-CCNC: 14 U/L (ref 0–31)
BILIRUB SERPL-MCNC: 0.2 MG/DL (ref 0–1.2)
BILIRUBIN DIRECT: <0.2 MG/DL (ref 0–0.3)
BILIRUBIN, INDIRECT: ABNORMAL MG/DL (ref 0–1)
BLOOD CULTURE, ROUTINE: NORMAL
BUN BLDV-MCNC: 13 MG/DL (ref 8–23)
CALCIUM SERPL-MCNC: 8.8 MG/DL (ref 8.6–10.2)
CHLORIDE BLD-SCNC: 95 MMOL/L (ref 98–107)
CO2: 33 MMOL/L (ref 22–29)
CREAT SERPL-MCNC: 0.4 MG/DL (ref 0.5–1)
CULTURE, BLOOD 2: NORMAL
GFR AFRICAN AMERICAN: >60
GFR NON-AFRICAN AMERICAN: >60 ML/MIN/1.73
GLUCOSE BLD-MCNC: 118 MG/DL (ref 74–99)
HCT VFR BLD CALC: 36.9 % (ref 34–48)
HEMOGLOBIN: 11.3 G/DL (ref 11.5–15.5)
MAGNESIUM: 2.4 MG/DL (ref 1.6–2.6)
MCH RBC QN AUTO: 30.6 PG (ref 26–35)
MCHC RBC AUTO-ENTMCNC: 30.6 % (ref 32–34.5)
MCV RBC AUTO: 100 FL (ref 80–99.9)
METER GLUCOSE: 106 MG/DL (ref 74–99)
METER GLUCOSE: 133 MG/DL (ref 74–99)
METER GLUCOSE: 211 MG/DL (ref 74–99)
METER GLUCOSE: 241 MG/DL (ref 74–99)
METER GLUCOSE: 292 MG/DL (ref 74–99)
METER GLUCOSE: 344 MG/DL (ref 74–99)
METER GLUCOSE: 379 MG/DL (ref 74–99)
METER GLUCOSE: 40 MG/DL (ref 74–99)
METER GLUCOSE: 61 MG/DL (ref 74–99)
PDW BLD-RTO: 13.6 FL (ref 11.5–15)
PHOSPHORUS: 4.4 MG/DL (ref 2.5–4.5)
PLATELET # BLD: 155 E9/L (ref 130–450)
PMV BLD AUTO: 10.5 FL (ref 7–12)
POTASSIUM SERPL-SCNC: 4.3 MMOL/L (ref 3.5–5)
RBC # BLD: 3.69 E12/L (ref 3.5–5.5)
SODIUM BLD-SCNC: 138 MMOL/L (ref 132–146)
TOTAL PROTEIN: 5.5 G/DL (ref 6.4–8.3)
WBC # BLD: 6.3 E9/L (ref 4.5–11.5)

## 2019-11-11 PROCEDURE — 2060000000 HC ICU INTERMEDIATE R&B

## 2019-11-11 PROCEDURE — 97166 OT EVAL MOD COMPLEX 45 MIN: CPT

## 2019-11-11 PROCEDURE — 97535 SELF CARE MNGMENT TRAINING: CPT

## 2019-11-11 PROCEDURE — 6370000000 HC RX 637 (ALT 250 FOR IP): Performed by: INTERNAL MEDICINE

## 2019-11-11 PROCEDURE — 82962 GLUCOSE BLOOD TEST: CPT

## 2019-11-11 PROCEDURE — 97530 THERAPEUTIC ACTIVITIES: CPT

## 2019-11-11 PROCEDURE — 84100 ASSAY OF PHOSPHORUS: CPT

## 2019-11-11 PROCEDURE — C9113 INJ PANTOPRAZOLE SODIUM, VIA: HCPCS | Performed by: INTERNAL MEDICINE

## 2019-11-11 PROCEDURE — 80048 BASIC METABOLIC PNL TOTAL CA: CPT

## 2019-11-11 PROCEDURE — 2500000003 HC RX 250 WO HCPCS: Performed by: INTERNAL MEDICINE

## 2019-11-11 PROCEDURE — 6360000002 HC RX W HCPCS: Performed by: INTERNAL MEDICINE

## 2019-11-11 PROCEDURE — 83735 ASSAY OF MAGNESIUM: CPT

## 2019-11-11 PROCEDURE — 36415 COLL VENOUS BLD VENIPUNCTURE: CPT

## 2019-11-11 PROCEDURE — 85027 COMPLETE CBC AUTOMATED: CPT

## 2019-11-11 PROCEDURE — 2580000003 HC RX 258: Performed by: INTERNAL MEDICINE

## 2019-11-11 PROCEDURE — 2700000000 HC OXYGEN THERAPY PER DAY

## 2019-11-11 PROCEDURE — 94640 AIRWAY INHALATION TREATMENT: CPT

## 2019-11-11 PROCEDURE — 80076 HEPATIC FUNCTION PANEL: CPT

## 2019-11-11 RX ADMIN — INSULIN LISPRO 2 UNITS: 100 INJECTION, SOLUTION INTRAVENOUS; SUBCUTANEOUS at 16:51

## 2019-11-11 RX ADMIN — ONDANSETRON 4 MG: 2 INJECTION INTRAMUSCULAR; INTRAVENOUS at 19:00

## 2019-11-11 RX ADMIN — BUDESONIDE 1000 MCG: 0.5 SUSPENSION RESPIRATORY (INHALATION) at 21:28

## 2019-11-11 RX ADMIN — BUDESONIDE 1000 MCG: 0.5 SUSPENSION RESPIRATORY (INHALATION) at 07:52

## 2019-11-11 RX ADMIN — INSULIN LISPRO 3 UNITS: 100 INJECTION, SOLUTION INTRAVENOUS; SUBCUTANEOUS at 16:49

## 2019-11-11 RX ADMIN — PANTOPRAZOLE SODIUM 40 MG: 40 INJECTION, POWDER, FOR SOLUTION INTRAVENOUS at 11:09

## 2019-11-11 RX ADMIN — ENOXAPARIN SODIUM 30 MG: 30 INJECTION SUBCUTANEOUS at 11:08

## 2019-11-11 RX ADMIN — MUPIROCIN: 20 OINTMENT TOPICAL at 21:14

## 2019-11-11 RX ADMIN — DEXTROSE 50 % IN WATER (D50W) INTRAVENOUS SYRINGE 12.5 G: at 02:37

## 2019-11-11 RX ADMIN — IPRATROPIUM BROMIDE AND ALBUTEROL SULFATE 1 AMPULE: .5; 3 SOLUTION RESPIRATORY (INHALATION) at 15:12

## 2019-11-11 RX ADMIN — FORMOTEROL FUMARATE DIHYDRATE 20 MCG: 20 SOLUTION RESPIRATORY (INHALATION) at 21:28

## 2019-11-11 RX ADMIN — INSULIN LISPRO 5 UNITS: 100 INJECTION, SOLUTION INTRAVENOUS; SUBCUTANEOUS at 11:07

## 2019-11-11 RX ADMIN — INSULIN LISPRO 2 UNITS: 100 INJECTION, SOLUTION INTRAVENOUS; SUBCUTANEOUS at 11:10

## 2019-11-11 RX ADMIN — MUPIROCIN: 20 OINTMENT TOPICAL at 11:19

## 2019-11-11 RX ADMIN — ONDANSETRON 4 MG: 2 INJECTION INTRAMUSCULAR; INTRAVENOUS at 11:08

## 2019-11-11 RX ADMIN — FORMOTEROL FUMARATE DIHYDRATE 20 MCG: 20 SOLUTION RESPIRATORY (INHALATION) at 07:52

## 2019-11-11 RX ADMIN — IPRATROPIUM BROMIDE AND ALBUTEROL SULFATE 1 AMPULE: .5; 3 SOLUTION RESPIRATORY (INHALATION) at 07:51

## 2019-11-11 RX ADMIN — PREDNISOLONE ACETATE 1 DROP: 10 SUSPENSION/ DROPS OPHTHALMIC at 11:21

## 2019-11-11 RX ADMIN — LEVOTHYROXINE SODIUM 25 MCG: 100 INJECTION, POWDER, LYOPHILIZED, FOR SOLUTION INTRAVENOUS at 11:08

## 2019-11-11 RX ADMIN — INSULIN GLARGINE 10 UNITS: 100 INJECTION, SOLUTION SUBCUTANEOUS at 11:21

## 2019-11-11 RX ADMIN — DEXTROSE 50 % IN WATER (D50W) INTRAVENOUS SYRINGE 12.5 G: at 06:22

## 2019-11-11 RX ADMIN — Medication 10 ML: at 11:18

## 2019-11-11 RX ADMIN — CEFTRIAXONE SODIUM 1 G: 1 INJECTION, POWDER, FOR SOLUTION INTRAMUSCULAR; INTRAVENOUS at 16:50

## 2019-11-11 RX ADMIN — IPRATROPIUM BROMIDE AND ALBUTEROL SULFATE 1 AMPULE: .5; 3 SOLUTION RESPIRATORY (INHALATION) at 12:16

## 2019-11-11 RX ADMIN — IPRATROPIUM BROMIDE AND ALBUTEROL SULFATE 1 AMPULE: .5; 3 SOLUTION RESPIRATORY (INHALATION) at 21:28

## 2019-11-11 RX ADMIN — Medication 10 ML: at 21:14

## 2019-11-11 ASSESSMENT — PAIN SCALES - GENERAL
PAINLEVEL_OUTOF10: 0
PAINLEVEL_OUTOF10: 0

## 2019-11-12 VITALS
HEART RATE: 70 BPM | TEMPERATURE: 97.5 F | OXYGEN SATURATION: 98 % | DIASTOLIC BLOOD PRESSURE: 59 MMHG | WEIGHT: 97.4 LBS | SYSTOLIC BLOOD PRESSURE: 105 MMHG | HEIGHT: 62 IN | BODY MASS INDEX: 17.92 KG/M2 | RESPIRATION RATE: 20 BRPM

## 2019-11-12 LAB
ALBUMIN SERPL-MCNC: 3 G/DL (ref 3.5–5.2)
ALP BLD-CCNC: 78 U/L (ref 35–104)
ALT SERPL-CCNC: 10 U/L (ref 0–32)
ANION GAP SERPL CALCULATED.3IONS-SCNC: 9 MMOL/L (ref 7–16)
AST SERPL-CCNC: 13 U/L (ref 0–31)
BILIRUB SERPL-MCNC: 0.3 MG/DL (ref 0–1.2)
BILIRUBIN DIRECT: <0.2 MG/DL (ref 0–0.3)
BILIRUBIN, INDIRECT: ABNORMAL MG/DL (ref 0–1)
BUN BLDV-MCNC: 14 MG/DL (ref 8–23)
CALCIUM SERPL-MCNC: 8.8 MG/DL (ref 8.6–10.2)
CHLORIDE BLD-SCNC: 95 MMOL/L (ref 98–107)
CO2: 34 MMOL/L (ref 22–29)
CREAT SERPL-MCNC: 0.4 MG/DL (ref 0.5–1)
GFR AFRICAN AMERICAN: >60
GFR NON-AFRICAN AMERICAN: >60 ML/MIN/1.73
GLUCOSE BLD-MCNC: 199 MG/DL (ref 74–99)
HCT VFR BLD CALC: 34.8 % (ref 34–48)
HEMOGLOBIN: 10.6 G/DL (ref 11.5–15.5)
MAGNESIUM: 2.1 MG/DL (ref 1.6–2.6)
MCH RBC QN AUTO: 30.5 PG (ref 26–35)
MCHC RBC AUTO-ENTMCNC: 30.5 % (ref 32–34.5)
MCV RBC AUTO: 100.3 FL (ref 80–99.9)
METER GLUCOSE: 197 MG/DL (ref 74–99)
METER GLUCOSE: 230 MG/DL (ref 74–99)
METER GLUCOSE: 272 MG/DL (ref 74–99)
METER GLUCOSE: 273 MG/DL (ref 74–99)
PDW BLD-RTO: 13.5 FL (ref 11.5–15)
PHOSPHORUS: 3.5 MG/DL (ref 2.5–4.5)
PLATELET # BLD: 143 E9/L (ref 130–450)
PMV BLD AUTO: 10.2 FL (ref 7–12)
POTASSIUM SERPL-SCNC: 4.9 MMOL/L (ref 3.5–5)
RBC # BLD: 3.47 E12/L (ref 3.5–5.5)
SODIUM BLD-SCNC: 138 MMOL/L (ref 132–146)
TOTAL PROTEIN: 5.4 G/DL (ref 6.4–8.3)
WBC # BLD: 5.7 E9/L (ref 4.5–11.5)

## 2019-11-12 PROCEDURE — 2580000003 HC RX 258: Performed by: INTERNAL MEDICINE

## 2019-11-12 PROCEDURE — 6370000000 HC RX 637 (ALT 250 FOR IP): Performed by: INTERNAL MEDICINE

## 2019-11-12 PROCEDURE — 80076 HEPATIC FUNCTION PANEL: CPT

## 2019-11-12 PROCEDURE — C9113 INJ PANTOPRAZOLE SODIUM, VIA: HCPCS | Performed by: INTERNAL MEDICINE

## 2019-11-12 PROCEDURE — 2700000000 HC OXYGEN THERAPY PER DAY

## 2019-11-12 PROCEDURE — 6360000002 HC RX W HCPCS: Performed by: INTERNAL MEDICINE

## 2019-11-12 PROCEDURE — 84100 ASSAY OF PHOSPHORUS: CPT

## 2019-11-12 PROCEDURE — 94640 AIRWAY INHALATION TREATMENT: CPT

## 2019-11-12 PROCEDURE — 2500000003 HC RX 250 WO HCPCS: Performed by: INTERNAL MEDICINE

## 2019-11-12 PROCEDURE — 82962 GLUCOSE BLOOD TEST: CPT

## 2019-11-12 PROCEDURE — 83735 ASSAY OF MAGNESIUM: CPT

## 2019-11-12 PROCEDURE — 85027 COMPLETE CBC AUTOMATED: CPT

## 2019-11-12 PROCEDURE — 80048 BASIC METABOLIC PNL TOTAL CA: CPT

## 2019-11-12 PROCEDURE — 36415 COLL VENOUS BLD VENIPUNCTURE: CPT

## 2019-11-12 RX ADMIN — INSULIN LISPRO 2 UNITS: 100 INJECTION, SOLUTION INTRAVENOUS; SUBCUTANEOUS at 16:33

## 2019-11-12 RX ADMIN — INSULIN LISPRO 2 UNITS: 100 INJECTION, SOLUTION INTRAVENOUS; SUBCUTANEOUS at 10:06

## 2019-11-12 RX ADMIN — INSULIN LISPRO 2 UNITS: 100 INJECTION, SOLUTION INTRAVENOUS; SUBCUTANEOUS at 12:12

## 2019-11-12 RX ADMIN — INSULIN GLARGINE 10 UNITS: 100 INJECTION, SOLUTION SUBCUTANEOUS at 10:05

## 2019-11-12 RX ADMIN — ENOXAPARIN SODIUM 30 MG: 30 INJECTION SUBCUTANEOUS at 09:59

## 2019-11-12 RX ADMIN — LEVOTHYROXINE SODIUM 25 MCG: 100 INJECTION, POWDER, LYOPHILIZED, FOR SOLUTION INTRAVENOUS at 10:00

## 2019-11-12 RX ADMIN — FORMOTEROL FUMARATE DIHYDRATE 20 MCG: 20 SOLUTION RESPIRATORY (INHALATION) at 07:39

## 2019-11-12 RX ADMIN — PANTOPRAZOLE SODIUM 40 MG: 40 INJECTION, POWDER, FOR SOLUTION INTRAVENOUS at 09:59

## 2019-11-12 RX ADMIN — BUDESONIDE 1000 MCG: 0.5 SUSPENSION RESPIRATORY (INHALATION) at 07:39

## 2019-11-12 RX ADMIN — Medication 10 ML: at 09:59

## 2019-11-12 RX ADMIN — IPRATROPIUM BROMIDE AND ALBUTEROL SULFATE 1 AMPULE: .5; 3 SOLUTION RESPIRATORY (INHALATION) at 12:24

## 2019-11-12 RX ADMIN — INSULIN LISPRO 3 UNITS: 100 INJECTION, SOLUTION INTRAVENOUS; SUBCUTANEOUS at 12:11

## 2019-11-12 RX ADMIN — PREDNISOLONE ACETATE 1 DROP: 10 SUSPENSION/ DROPS OPHTHALMIC at 09:59

## 2019-11-12 RX ADMIN — INSULIN LISPRO 2 UNITS: 100 INJECTION, SOLUTION INTRAVENOUS; SUBCUTANEOUS at 16:29

## 2019-11-12 RX ADMIN — ACETAMINOPHEN 650 MG: 325 TABLET, FILM COATED ORAL at 03:35

## 2019-11-12 RX ADMIN — MUPIROCIN: 20 OINTMENT TOPICAL at 10:07

## 2019-11-12 RX ADMIN — IPRATROPIUM BROMIDE AND ALBUTEROL SULFATE 1 AMPULE: .5; 3 SOLUTION RESPIRATORY (INHALATION) at 07:39

## 2019-11-12 RX ADMIN — CEFTRIAXONE SODIUM 1 G: 1 INJECTION, POWDER, FOR SOLUTION INTRAMUSCULAR; INTRAVENOUS at 16:30

## 2019-11-12 ASSESSMENT — PAIN SCALES - GENERAL
PAINLEVEL_OUTOF10: 4
PAINLEVEL_OUTOF10: 8
PAINLEVEL_OUTOF10: 0
PAINLEVEL_OUTOF10: 0

## 2021-01-01 ENCOUNTER — APPOINTMENT (OUTPATIENT)
Dept: CT IMAGING | Age: 86
DRG: 189 | End: 2021-01-01
Payer: MEDICARE

## 2021-01-01 ENCOUNTER — APPOINTMENT (OUTPATIENT)
Dept: GENERAL RADIOLOGY | Age: 86
DRG: 189 | End: 2021-01-01
Payer: MEDICARE

## 2021-01-01 ENCOUNTER — HOSPITAL ENCOUNTER (INPATIENT)
Age: 86
LOS: 14 days | Discharge: HOSPICE | DRG: 189 | End: 2021-01-29
Attending: EMERGENCY MEDICINE | Admitting: FAMILY MEDICINE
Payer: MEDICARE

## 2021-01-01 VITALS
SYSTOLIC BLOOD PRESSURE: 106 MMHG | HEIGHT: 62 IN | WEIGHT: 93.7 LBS | BODY MASS INDEX: 17.24 KG/M2 | RESPIRATION RATE: 20 BRPM | HEART RATE: 68 BPM | TEMPERATURE: 98.2 F | OXYGEN SATURATION: 99 % | DIASTOLIC BLOOD PRESSURE: 56 MMHG

## 2021-01-01 DIAGNOSIS — J96.02 ACUTE RESPIRATORY FAILURE WITH HYPERCAPNIA (HCC): Primary | ICD-10-CM

## 2021-01-01 DIAGNOSIS — Z51.5 HOSPICE CARE: ICD-10-CM

## 2021-01-01 DIAGNOSIS — J44.1 COPD EXACERBATION (HCC): ICD-10-CM

## 2021-01-01 DIAGNOSIS — J96.02 ACUTE HYPERCAPNIC RESPIRATORY FAILURE (HCC): ICD-10-CM

## 2021-01-01 DIAGNOSIS — E87.20 LACTIC ACIDOSIS: ICD-10-CM

## 2021-01-01 LAB
AADO2: 173.8 MMHG
AADO2: 178 MMHG
ADENOVIRUS BY PCR: NOT DETECTED
ALBUMIN SERPL-MCNC: 3.6 G/DL (ref 3.5–5.2)
ALP BLD-CCNC: 136 U/L (ref 35–104)
ALT SERPL-CCNC: 25 U/L (ref 0–32)
ANION GAP SERPL CALCULATED.3IONS-SCNC: 1 MMOL/L (ref 7–16)
ANION GAP SERPL CALCULATED.3IONS-SCNC: 12 MMOL/L (ref 7–16)
ANION GAP SERPL CALCULATED.3IONS-SCNC: 2 MMOL/L (ref 7–16)
ANION GAP SERPL CALCULATED.3IONS-SCNC: 4 MMOL/L (ref 7–16)
ANION GAP SERPL CALCULATED.3IONS-SCNC: 5 MMOL/L (ref 7–16)
ANION GAP SERPL CALCULATED.3IONS-SCNC: 6 MMOL/L (ref 7–16)
ANION GAP SERPL CALCULATED.3IONS-SCNC: 8 MMOL/L (ref 7–16)
ANION GAP SERPL CALCULATED.3IONS-SCNC: 8 MMOL/L (ref 7–16)
APTT: 23.8 SEC (ref 24.5–35.1)
AST SERPL-CCNC: 70 U/L (ref 0–31)
B.E.: 14 MMOL/L (ref -3–3)
B.E.: 8.1 MMOL/L (ref -3–3)
B.E.: 8.6 MMOL/L (ref -3–3)
BACTERIA: NORMAL /HPF
BASOPHILS ABSOLUTE: 0.01 E9/L (ref 0–0.2)
BASOPHILS ABSOLUTE: 0.02 E9/L (ref 0–0.2)
BASOPHILS RELATIVE PERCENT: 0.2 % (ref 0–2)
BASOPHILS RELATIVE PERCENT: 0.2 % (ref 0–2)
BILIRUB SERPL-MCNC: 0.7 MG/DL (ref 0–1.2)
BILIRUBIN URINE: NEGATIVE
BLOOD CULTURE, ROUTINE: NORMAL
BLOOD, URINE: NORMAL
BORDETELLA PARAPERTUSSIS BY PCR: NOT DETECTED
BORDETELLA PERTUSSIS BY PCR: NOT DETECTED
BUN BLDV-MCNC: 11 MG/DL (ref 8–23)
BUN BLDV-MCNC: 12 MG/DL (ref 8–23)
BUN BLDV-MCNC: 15 MG/DL (ref 8–23)
BUN BLDV-MCNC: 15 MG/DL (ref 8–23)
BUN BLDV-MCNC: 16 MG/DL (ref 8–23)
BUN BLDV-MCNC: 20 MG/DL (ref 8–23)
BUN BLDV-MCNC: 22 MG/DL (ref 8–23)
BUN BLDV-MCNC: 23 MG/DL (ref 8–23)
BUN BLDV-MCNC: 24 MG/DL (ref 8–23)
BUN BLDV-MCNC: 25 MG/DL (ref 8–23)
BUN BLDV-MCNC: 28 MG/DL (ref 8–23)
BUN BLDV-MCNC: 29 MG/DL (ref 8–23)
BUN BLDV-MCNC: 32 MG/DL (ref 8–23)
BUN BLDV-MCNC: 37 MG/DL (ref 8–23)
BUN BLDV-MCNC: 38 MG/DL (ref 8–23)
C-REACTIVE PROTEIN: 0.3 MG/DL (ref 0–0.4)
CALCIUM SERPL-MCNC: 8.4 MG/DL (ref 8.6–10.2)
CALCIUM SERPL-MCNC: 8.5 MG/DL (ref 8.6–10.2)
CALCIUM SERPL-MCNC: 8.6 MG/DL (ref 8.6–10.2)
CALCIUM SERPL-MCNC: 8.7 MG/DL (ref 8.6–10.2)
CALCIUM SERPL-MCNC: 8.8 MG/DL (ref 8.6–10.2)
CALCIUM SERPL-MCNC: 8.8 MG/DL (ref 8.6–10.2)
CALCIUM SERPL-MCNC: 8.9 MG/DL (ref 8.6–10.2)
CALCIUM SERPL-MCNC: 8.9 MG/DL (ref 8.6–10.2)
CALCIUM SERPL-MCNC: 9 MG/DL (ref 8.6–10.2)
CHLAMYDOPHILIA PNEUMONIAE BY PCR: NOT DETECTED
CHLORIDE BLD-SCNC: 90 MMOL/L (ref 98–107)
CHLORIDE BLD-SCNC: 94 MMOL/L (ref 98–107)
CHLORIDE BLD-SCNC: 95 MMOL/L (ref 98–107)
CHLORIDE BLD-SCNC: 96 MMOL/L (ref 98–107)
CHP ED QC CHECK: YES
CLARITY: CLEAR
CO2: 36 MMOL/L (ref 22–29)
CO2: 38 MMOL/L (ref 22–29)
CO2: 39 MMOL/L (ref 22–29)
CO2: 39 MMOL/L (ref 22–29)
CO2: 40 MMOL/L (ref 22–29)
CO2: 42 MMOL/L (ref 22–29)
CO2: 43 MMOL/L (ref 22–29)
CO2: 43 MMOL/L (ref 22–29)
COHB: 1 % (ref 0–1.5)
COHB: 1.2 % (ref 0–1.5)
COHB: 1.3 % (ref 0–1.5)
COLOR: YELLOW
CORONAVIRUS 229E BY PCR: NOT DETECTED
CORONAVIRUS HKU1 BY PCR: NOT DETECTED
CORONAVIRUS NL63 BY PCR: NOT DETECTED
CORONAVIRUS OC43 BY PCR: NOT DETECTED
CREAT SERPL-MCNC: 0.5 MG/DL (ref 0.5–1)
CREAT SERPL-MCNC: 0.6 MG/DL (ref 0.5–1)
CREAT SERPL-MCNC: 0.7 MG/DL (ref 0.5–1)
CREAT SERPL-MCNC: 0.8 MG/DL (ref 0.5–1)
CREAT SERPL-MCNC: 1 MG/DL (ref 0.5–1)
CRITICAL: ABNORMAL
CULTURE, BLOOD 2: NORMAL
DATE ANALYZED: ABNORMAL
DATE OF COLLECTION: ABNORMAL
EKG ATRIAL RATE: 85 BPM
EKG P AXIS: 60 DEGREES
EKG P-R INTERVAL: 148 MS
EKG Q-T INTERVAL: 404 MS
EKG QRS DURATION: 80 MS
EKG QTC CALCULATION (BAZETT): 480 MS
EKG R AXIS: 19 DEGREES
EKG T AXIS: 26 DEGREES
EKG VENTRICULAR RATE: 85 BPM
EOSINOPHILS ABSOLUTE: 0 E9/L (ref 0.05–0.5)
EOSINOPHILS ABSOLUTE: 0.13 E9/L (ref 0.05–0.5)
EOSINOPHILS RELATIVE PERCENT: 0 % (ref 0–6)
EOSINOPHILS RELATIVE PERCENT: 1.3 % (ref 0–6)
FIO2: 50 %
FIO2: 60 %
FOLATE: 17.9 NG/ML (ref 4.8–24.2)
GFR AFRICAN AMERICAN: >60
GFR NON-AFRICAN AMERICAN: 53 ML/MIN/1.73
GFR NON-AFRICAN AMERICAN: >60 ML/MIN/1.73
GLUCOSE BLD-MCNC: 114 MG/DL (ref 74–99)
GLUCOSE BLD-MCNC: 118 MG/DL (ref 74–99)
GLUCOSE BLD-MCNC: 131 MG/DL (ref 74–99)
GLUCOSE BLD-MCNC: 138 MG/DL (ref 74–99)
GLUCOSE BLD-MCNC: 139 MG/DL (ref 74–99)
GLUCOSE BLD-MCNC: 143 MG/DL (ref 74–99)
GLUCOSE BLD-MCNC: 148 MG/DL (ref 74–99)
GLUCOSE BLD-MCNC: 161 MG/DL (ref 74–99)
GLUCOSE BLD-MCNC: 209 MG/DL
GLUCOSE BLD-MCNC: 234 MG/DL (ref 74–99)
GLUCOSE BLD-MCNC: 234 MG/DL (ref 74–99)
GLUCOSE BLD-MCNC: 277 MG/DL (ref 74–99)
GLUCOSE BLD-MCNC: 306 MG/DL (ref 74–99)
GLUCOSE BLD-MCNC: 76 MG/DL (ref 74–99)
GLUCOSE BLD-MCNC: 85 MG/DL (ref 74–99)
GLUCOSE BLD-MCNC: 92 MG/DL (ref 74–99)
GLUCOSE URINE: NEGATIVE MG/DL
HCO3: 37.8 MMOL/L (ref 22–26)
HCO3: 38.3 MMOL/L (ref 22–26)
HCO3: 43 MMOL/L (ref 22–26)
HCT VFR BLD CALC: 35.7 % (ref 34–48)
HCT VFR BLD CALC: 35.8 % (ref 34–48)
HCT VFR BLD CALC: 36.9 % (ref 34–48)
HCT VFR BLD CALC: 37.1 % (ref 34–48)
HCT VFR BLD CALC: 37.4 % (ref 34–48)
HCT VFR BLD CALC: 38.3 % (ref 34–48)
HCT VFR BLD CALC: 38.9 % (ref 34–48)
HCT VFR BLD CALC: 39.4 % (ref 34–48)
HCT VFR BLD CALC: 39.9 % (ref 34–48)
HCT VFR BLD CALC: 40 % (ref 34–48)
HCT VFR BLD CALC: 40.5 % (ref 34–48)
HCT VFR BLD CALC: 40.6 % (ref 34–48)
HCT VFR BLD CALC: 40.9 % (ref 34–48)
HCT VFR BLD CALC: 41.8 % (ref 34–48)
HCT VFR BLD CALC: 44.2 % (ref 34–48)
HEMOGLOBIN: 10.8 G/DL (ref 11.5–15.5)
HEMOGLOBIN: 10.8 G/DL (ref 11.5–15.5)
HEMOGLOBIN: 11.5 G/DL (ref 11.5–15.5)
HEMOGLOBIN: 11.7 G/DL (ref 11.5–15.5)
HEMOGLOBIN: 11.9 G/DL (ref 11.5–15.5)
HEMOGLOBIN: 11.9 G/DL (ref 11.5–15.5)
HEMOGLOBIN: 12 G/DL (ref 11.5–15.5)
HEMOGLOBIN: 12 G/DL (ref 11.5–15.5)
HEMOGLOBIN: 12.2 G/DL (ref 11.5–15.5)
HEMOGLOBIN: 12.2 G/DL (ref 11.5–15.5)
HEMOGLOBIN: 12.3 G/DL (ref 11.5–15.5)
HEMOGLOBIN: 12.5 G/DL (ref 11.5–15.5)
HEMOGLOBIN: 13.2 G/DL (ref 11.5–15.5)
HHB: 1.2 % (ref 0–5)
HHB: 2.6 % (ref 0–5)
HHB: 3.7 % (ref 0–5)
HUMAN METAPNEUMOVIRUS BY PCR: NOT DETECTED
HUMAN RHINOVIRUS/ENTEROVIRUS BY PCR: NOT DETECTED
IMMATURE GRANULOCYTES #: 0.02 E9/L
IMMATURE GRANULOCYTES #: 0.07 E9/L
IMMATURE GRANULOCYTES %: 0.4 % (ref 0–5)
IMMATURE GRANULOCYTES %: 0.7 % (ref 0–5)
INFLUENZA A BY PCR: NOT DETECTED
INFLUENZA B BY PCR: NOT DETECTED
INR BLD: 1.2
KETONES, URINE: NEGATIVE MG/DL
LAB: ABNORMAL
LACTATE DEHYDROGENASE: 226 U/L (ref 135–214)
LACTIC ACID, SEPSIS: 1.4 MMOL/L (ref 0.5–1.9)
LACTIC ACID, SEPSIS: 1.7 MMOL/L (ref 0.5–1.9)
LACTIC ACID: 5.1 MMOL/L (ref 0.5–2.2)
LEUKOCYTE ESTERASE, URINE: NEGATIVE
LYMPHOCYTES ABSOLUTE: 0.78 E9/L (ref 1.5–4)
LYMPHOCYTES ABSOLUTE: 0.85 E9/L (ref 1.5–4)
LYMPHOCYTES RELATIVE PERCENT: 16.6 % (ref 20–42)
LYMPHOCYTES RELATIVE PERCENT: 8.6 % (ref 20–42)
Lab: ABNORMAL
MAGNESIUM: 2.1 MG/DL (ref 1.6–2.6)
MCH RBC QN AUTO: 30.5 PG (ref 26–35)
MCH RBC QN AUTO: 30.7 PG (ref 26–35)
MCH RBC QN AUTO: 30.8 PG (ref 26–35)
MCH RBC QN AUTO: 30.9 PG (ref 26–35)
MCH RBC QN AUTO: 31 PG (ref 26–35)
MCH RBC QN AUTO: 31 PG (ref 26–35)
MCH RBC QN AUTO: 31.1 PG (ref 26–35)
MCH RBC QN AUTO: 31.1 PG (ref 26–35)
MCH RBC QN AUTO: 31.2 PG (ref 26–35)
MCH RBC QN AUTO: 31.3 PG (ref 26–35)
MCH RBC QN AUTO: 31.4 PG (ref 26–35)
MCH RBC QN AUTO: 31.6 PG (ref 26–35)
MCH RBC QN AUTO: 31.8 PG (ref 26–35)
MCHC RBC AUTO-ENTMCNC: 29.8 % (ref 32–34.5)
MCHC RBC AUTO-ENTMCNC: 29.8 % (ref 32–34.5)
MCHC RBC AUTO-ENTMCNC: 29.9 % (ref 32–34.5)
MCHC RBC AUTO-ENTMCNC: 29.9 % (ref 32–34.5)
MCHC RBC AUTO-ENTMCNC: 30 % (ref 32–34.5)
MCHC RBC AUTO-ENTMCNC: 30 % (ref 32–34.5)
MCHC RBC AUTO-ENTMCNC: 30.2 % (ref 32–34.5)
MCHC RBC AUTO-ENTMCNC: 30.3 % (ref 32–34.5)
MCHC RBC AUTO-ENTMCNC: 30.4 % (ref 32–34.5)
MCHC RBC AUTO-ENTMCNC: 30.5 % (ref 32–34.5)
MCHC RBC AUTO-ENTMCNC: 30.5 % (ref 32–34.5)
MCHC RBC AUTO-ENTMCNC: 30.6 % (ref 32–34.5)
MCHC RBC AUTO-ENTMCNC: 30.7 % (ref 32–34.5)
MCHC RBC AUTO-ENTMCNC: 31 % (ref 32–34.5)
MCHC RBC AUTO-ENTMCNC: 31.2 % (ref 32–34.5)
MCV RBC AUTO: 100.8 FL (ref 80–99.9)
MCV RBC AUTO: 101.1 FL (ref 80–99.9)
MCV RBC AUTO: 101.3 FL (ref 80–99.9)
MCV RBC AUTO: 101.6 FL (ref 80–99.9)
MCV RBC AUTO: 101.9 FL (ref 80–99.9)
MCV RBC AUTO: 101.9 FL (ref 80–99.9)
MCV RBC AUTO: 102.5 FL (ref 80–99.9)
MCV RBC AUTO: 102.8 FL (ref 80–99.9)
MCV RBC AUTO: 103.1 FL (ref 80–99.9)
MCV RBC AUTO: 103.1 FL (ref 80–99.9)
MCV RBC AUTO: 103.5 FL (ref 80–99.9)
MCV RBC AUTO: 103.7 FL (ref 80–99.9)
MCV RBC AUTO: 103.8 FL (ref 80–99.9)
MCV RBC AUTO: 104 FL (ref 80–99.9)
MCV RBC AUTO: 105 FL (ref 80–99.9)
METER GLUCOSE: 101 MG/DL (ref 74–99)
METER GLUCOSE: 109 MG/DL (ref 74–99)
METER GLUCOSE: 115 MG/DL (ref 74–99)
METER GLUCOSE: 120 MG/DL (ref 74–99)
METER GLUCOSE: 128 MG/DL (ref 74–99)
METER GLUCOSE: 132 MG/DL (ref 74–99)
METER GLUCOSE: 140 MG/DL (ref 74–99)
METER GLUCOSE: 143 MG/DL (ref 74–99)
METER GLUCOSE: 144 MG/DL (ref 74–99)
METER GLUCOSE: 145 MG/DL (ref 74–99)
METER GLUCOSE: 148 MG/DL (ref 74–99)
METER GLUCOSE: 154 MG/DL (ref 74–99)
METER GLUCOSE: 165 MG/DL (ref 74–99)
METER GLUCOSE: 174 MG/DL (ref 74–99)
METER GLUCOSE: 179 MG/DL (ref 74–99)
METER GLUCOSE: 184 MG/DL (ref 74–99)
METER GLUCOSE: 196 MG/DL (ref 74–99)
METER GLUCOSE: 198 MG/DL (ref 74–99)
METER GLUCOSE: 201 MG/DL (ref 74–99)
METER GLUCOSE: 202 MG/DL (ref 74–99)
METER GLUCOSE: 203 MG/DL (ref 74–99)
METER GLUCOSE: 209 MG/DL (ref 74–99)
METER GLUCOSE: 215 MG/DL (ref 74–99)
METER GLUCOSE: 230 MG/DL (ref 74–99)
METER GLUCOSE: 234 MG/DL (ref 74–99)
METER GLUCOSE: 239 MG/DL (ref 74–99)
METER GLUCOSE: 243 MG/DL (ref 74–99)
METER GLUCOSE: 249 MG/DL (ref 74–99)
METER GLUCOSE: 249 MG/DL (ref 74–99)
METER GLUCOSE: 251 MG/DL (ref 74–99)
METER GLUCOSE: 262 MG/DL (ref 74–99)
METER GLUCOSE: 273 MG/DL (ref 74–99)
METER GLUCOSE: 273 MG/DL (ref 74–99)
METER GLUCOSE: 302 MG/DL (ref 74–99)
METER GLUCOSE: 314 MG/DL (ref 74–99)
METER GLUCOSE: 331 MG/DL (ref 74–99)
METER GLUCOSE: 340 MG/DL (ref 74–99)
METER GLUCOSE: 348 MG/DL (ref 74–99)
METER GLUCOSE: 356 MG/DL (ref 74–99)
METER GLUCOSE: 383 MG/DL (ref 74–99)
METER GLUCOSE: 398 MG/DL (ref 74–99)
METER GLUCOSE: 401 MG/DL (ref 74–99)
METER GLUCOSE: 41 MG/DL (ref 74–99)
METER GLUCOSE: 413 MG/DL (ref 74–99)
METER GLUCOSE: 416 MG/DL (ref 74–99)
METER GLUCOSE: 417 MG/DL (ref 74–99)
METER GLUCOSE: 439 MG/DL (ref 74–99)
METER GLUCOSE: 455 MG/DL (ref 74–99)
METER GLUCOSE: 482 MG/DL (ref 74–99)
METER GLUCOSE: 54 MG/DL (ref 74–99)
METER GLUCOSE: 73 MG/DL (ref 74–99)
METER GLUCOSE: 74 MG/DL (ref 74–99)
METER GLUCOSE: 83 MG/DL (ref 74–99)
METER GLUCOSE: 85 MG/DL (ref 74–99)
METER GLUCOSE: 85 MG/DL (ref 74–99)
METER GLUCOSE: 87 MG/DL (ref 74–99)
METER GLUCOSE: 89 MG/DL (ref 74–99)
METER GLUCOSE: 90 MG/DL (ref 74–99)
METER GLUCOSE: 90 MG/DL (ref 74–99)
METER GLUCOSE: <40 MG/DL (ref 74–99)
METHB: 0.3 % (ref 0–1.5)
METHB: 0.4 % (ref 0–1.5)
METHB: 0.5 % (ref 0–1.5)
MODE: ABNORMAL
MONOCYTES ABSOLUTE: 0.11 E9/L (ref 0.1–0.95)
MONOCYTES ABSOLUTE: 0.7 E9/L (ref 0.1–0.95)
MONOCYTES RELATIVE PERCENT: 2.3 % (ref 2–12)
MONOCYTES RELATIVE PERCENT: 7.1 % (ref 2–12)
MYCOPLASMA PNEUMONIAE BY PCR: NOT DETECTED
NEUTROPHILS ABSOLUTE: 3.78 E9/L (ref 1.8–7.3)
NEUTROPHILS ABSOLUTE: 8.11 E9/L (ref 1.8–7.3)
NEUTROPHILS RELATIVE PERCENT: 80.5 % (ref 43–80)
NEUTROPHILS RELATIVE PERCENT: 82.1 % (ref 43–80)
NITRITE, URINE: NEGATIVE
O2 CONTENT: 17.5 ML/DL
O2 CONTENT: 17.5 ML/DL
O2 CONTENT: 18.1 ML/DL
O2 SATURATION: 96.2 % (ref 92–98.5)
O2 SATURATION: 97.4 % (ref 92–98.5)
O2 SATURATION: 98.8 % (ref 92–98.5)
O2HB: 94.6 % (ref 94–97)
O2HB: 95.8 % (ref 94–97)
O2HB: 97.4 % (ref 94–97)
OPERATOR ID: 1023
OPERATOR ID: 1023
OPERATOR ID: ABNORMAL
PARAINFLUENZA VIRUS 1 BY PCR: NOT DETECTED
PARAINFLUENZA VIRUS 2 BY PCR: NOT DETECTED
PARAINFLUENZA VIRUS 3 BY PCR: NOT DETECTED
PARAINFLUENZA VIRUS 4 BY PCR: NOT DETECTED
PATIENT TEMP: 37 C
PCO2: 76.4 MMHG (ref 35–45)
PCO2: 78 MMHG (ref 35–45)
PCO2: 86.5 MMHG (ref 35–45)
PDW BLD-RTO: 13.2 FL (ref 11.5–15)
PDW BLD-RTO: 13.3 FL (ref 11.5–15)
PDW BLD-RTO: 13.3 FL (ref 11.5–15)
PDW BLD-RTO: 13.4 FL (ref 11.5–15)
PDW BLD-RTO: 13.5 FL (ref 11.5–15)
PDW BLD-RTO: 13.6 FL (ref 11.5–15)
PEEP/CPAP: 6 CMH2O
PFO2: 1.6 MMHG/%
PFO2: 2.55 MMHG/%
PH BLOOD GAS: 7.26 (ref 7.35–7.45)
PH BLOOD GAS: 7.3 (ref 7.35–7.45)
PH BLOOD GAS: 7.37 (ref 7.35–7.45)
PH UA: 5 (ref 5–9)
PLATELET # BLD: 111 E9/L (ref 130–450)
PLATELET # BLD: 111 E9/L (ref 130–450)
PLATELET # BLD: 113 E9/L (ref 130–450)
PLATELET # BLD: 117 E9/L (ref 130–450)
PLATELET # BLD: 119 E9/L (ref 130–450)
PLATELET # BLD: 121 E9/L (ref 130–450)
PLATELET # BLD: 124 E9/L (ref 130–450)
PLATELET # BLD: 128 E9/L (ref 130–450)
PLATELET # BLD: 128 E9/L (ref 130–450)
PLATELET # BLD: 130 E9/L (ref 130–450)
PLATELET # BLD: 148 E9/L (ref 130–450)
PLATELET # BLD: 158 E9/L (ref 130–450)
PLATELET # BLD: 187 E9/L (ref 130–450)
PLATELET # BLD: 198 E9/L (ref 130–450)
PLATELET # BLD: 202 E9/L (ref 130–450)
PMV BLD AUTO: 10 FL (ref 7–12)
PMV BLD AUTO: 10 FL (ref 7–12)
PMV BLD AUTO: 10.2 FL (ref 7–12)
PMV BLD AUTO: 10.2 FL (ref 7–12)
PMV BLD AUTO: 10.5 FL (ref 7–12)
PMV BLD AUTO: 10.6 FL (ref 7–12)
PMV BLD AUTO: 10.7 FL (ref 7–12)
PMV BLD AUTO: 10.7 FL (ref 7–12)
PMV BLD AUTO: 9.6 FL (ref 7–12)
PMV BLD AUTO: 9.6 FL (ref 7–12)
PMV BLD AUTO: 9.8 FL (ref 7–12)
PMV BLD AUTO: 9.9 FL (ref 7–12)
PMV BLD AUTO: 9.9 FL (ref 7–12)
PO2: 103.5 MMHG (ref 75–100)
PO2: 153.2 MMHG (ref 75–100)
PO2: 80.1 MMHG (ref 75–100)
POTASSIUM REFLEX MAGNESIUM: 5.4 MMOL/L (ref 3.5–5)
POTASSIUM SERPL-SCNC: 4.3 MMOL/L (ref 3.5–5)
POTASSIUM SERPL-SCNC: 4.4 MMOL/L (ref 3.5–5)
POTASSIUM SERPL-SCNC: 4.4 MMOL/L (ref 3.5–5)
POTASSIUM SERPL-SCNC: 4.5 MMOL/L (ref 3.5–5)
POTASSIUM SERPL-SCNC: 4.6 MMOL/L (ref 3.5–5)
POTASSIUM SERPL-SCNC: 4.7 MMOL/L (ref 3.5–5)
POTASSIUM SERPL-SCNC: 5.2 MMOL/L (ref 3.5–5)
POTASSIUM SERPL-SCNC: 5.4 MMOL/L (ref 3.5–5)
POTASSIUM SERPL-SCNC: 5.4 MMOL/L (ref 3.5–5)
POTASSIUM SERPL-SCNC: 5.5 MMOL/L (ref 3.5–5)
PRO-BNP: 3003 PG/ML (ref 0–450)
PRO-BNP: 4151 PG/ML (ref 0–450)
PROCALCITONIN: 0.27 NG/ML (ref 0–0.08)
PROTEIN UA: NEGATIVE MG/DL
PROTHROMBIN TIME: 13.4 SEC (ref 9.3–12.4)
RBC # BLD: 3.46 E12/L (ref 3.5–5.5)
RBC # BLD: 3.54 E12/L (ref 3.5–5.5)
RBC # BLD: 3.62 E12/L (ref 3.5–5.5)
RBC # BLD: 3.67 E12/L (ref 3.5–5.5)
RBC # BLD: 3.68 E12/L (ref 3.5–5.5)
RBC # BLD: 3.76 E12/L (ref 3.5–5.5)
RBC # BLD: 3.8 E12/L (ref 3.5–5.5)
RBC # BLD: 3.84 E12/L (ref 3.5–5.5)
RBC # BLD: 3.88 E12/L (ref 3.5–5.5)
RBC # BLD: 3.88 E12/L (ref 3.5–5.5)
RBC # BLD: 3.93 E12/L (ref 3.5–5.5)
RBC # BLD: 3.94 E12/L (ref 3.5–5.5)
RBC # BLD: 3.96 E12/L (ref 3.5–5.5)
RBC # BLD: 3.98 E12/L (ref 3.5–5.5)
RBC # BLD: 4.25 E12/L (ref 3.5–5.5)
RBC UA: NORMAL /HPF (ref 0–2)
RESPIRATORY SYNCYTIAL VIRUS BY PCR: NOT DETECTED
RI(T): 113 %
RI(T): 2.22
RR MECHANICAL: 18 B/MIN
SARS-COV-2, PCR: NOT DETECTED
SODIUM BLD-SCNC: 136 MMOL/L (ref 132–146)
SODIUM BLD-SCNC: 138 MMOL/L (ref 132–146)
SODIUM BLD-SCNC: 138 MMOL/L (ref 132–146)
SODIUM BLD-SCNC: 139 MMOL/L (ref 132–146)
SODIUM BLD-SCNC: 139 MMOL/L (ref 132–146)
SODIUM BLD-SCNC: 140 MMOL/L (ref 132–146)
SODIUM BLD-SCNC: 141 MMOL/L (ref 132–146)
SODIUM BLD-SCNC: 142 MMOL/L (ref 132–146)
SOURCE, BLOOD GAS: ABNORMAL
SPECIFIC GRAVITY UA: 1.02 (ref 1–1.03)
THB: 12.6 G/DL (ref 11.5–16.5)
THB: 12.9 G/DL (ref 11.5–16.5)
THB: 13.6 G/DL (ref 11.5–16.5)
TIME ANALYZED: 1108
TIME ANALYZED: 1350
TIME ANALYZED: 1501
TOTAL PROTEIN: 6.2 G/DL (ref 6.4–8.3)
TROPONIN: 0.01 NG/ML (ref 0–0.03)
URINE CULTURE, ROUTINE: NORMAL
UROBILINOGEN, URINE: 0.2 E.U./DL
VITAMIN B-12: 246 PG/ML (ref 211–946)
VT MECHANICAL: 400 ML
WBC # BLD: 4.6 E9/L (ref 4.5–11.5)
WBC # BLD: 4.7 E9/L (ref 4.5–11.5)
WBC # BLD: 5 E9/L (ref 4.5–11.5)
WBC # BLD: 5.5 E9/L (ref 4.5–11.5)
WBC # BLD: 5.7 E9/L (ref 4.5–11.5)
WBC # BLD: 6 E9/L (ref 4.5–11.5)
WBC # BLD: 6 E9/L (ref 4.5–11.5)
WBC # BLD: 6.3 E9/L (ref 4.5–11.5)
WBC # BLD: 6.5 E9/L (ref 4.5–11.5)
WBC # BLD: 6.7 E9/L (ref 4.5–11.5)
WBC # BLD: 7.3 E9/L (ref 4.5–11.5)
WBC # BLD: 7.4 E9/L (ref 4.5–11.5)
WBC # BLD: 7.7 E9/L (ref 4.5–11.5)
WBC # BLD: 8.5 E9/L (ref 4.5–11.5)
WBC # BLD: 9.9 E9/L (ref 4.5–11.5)
WBC UA: NORMAL /HPF (ref 0–5)

## 2021-01-01 PROCEDURE — 6360000002 HC RX W HCPCS: Performed by: NURSE PRACTITIONER

## 2021-01-01 PROCEDURE — 2500000003 HC RX 250 WO HCPCS: Performed by: NURSE PRACTITIONER

## 2021-01-01 PROCEDURE — 2060000000 HC ICU INTERMEDIATE R&B

## 2021-01-01 PROCEDURE — 6370000000 HC RX 637 (ALT 250 FOR IP): Performed by: FAMILY MEDICINE

## 2021-01-01 PROCEDURE — 6370000000 HC RX 637 (ALT 250 FOR IP): Performed by: INTERNAL MEDICINE

## 2021-01-01 PROCEDURE — 2700000000 HC OXYGEN THERAPY PER DAY

## 2021-01-01 PROCEDURE — 6360000002 HC RX W HCPCS: Performed by: INTERNAL MEDICINE

## 2021-01-01 PROCEDURE — 94660 CPAP INITIATION&MGMT: CPT

## 2021-01-01 PROCEDURE — 80048 BASIC METABOLIC PNL TOTAL CA: CPT

## 2021-01-01 PROCEDURE — 94640 AIRWAY INHALATION TREATMENT: CPT

## 2021-01-01 PROCEDURE — 2580000003 HC RX 258: Performed by: INTERNAL MEDICINE

## 2021-01-01 PROCEDURE — 82805 BLOOD GASES W/O2 SATURATION: CPT

## 2021-01-01 PROCEDURE — 85027 COMPLETE CBC AUTOMATED: CPT

## 2021-01-01 PROCEDURE — 97530 THERAPEUTIC ACTIVITIES: CPT

## 2021-01-01 PROCEDURE — 83735 ASSAY OF MAGNESIUM: CPT

## 2021-01-01 PROCEDURE — 36415 COLL VENOUS BLD VENIPUNCTURE: CPT

## 2021-01-01 PROCEDURE — 97162 PT EVAL MOD COMPLEX 30 MIN: CPT

## 2021-01-01 PROCEDURE — 82962 GLUCOSE BLOOD TEST: CPT

## 2021-01-01 PROCEDURE — 99231 SBSQ HOSP IP/OBS SF/LOW 25: CPT | Performed by: NURSE PRACTITIONER

## 2021-01-01 PROCEDURE — 6360000002 HC RX W HCPCS: Performed by: EMERGENCY MEDICINE

## 2021-01-01 PROCEDURE — 2580000003 HC RX 258: Performed by: NURSE PRACTITIONER

## 2021-01-01 PROCEDURE — 83880 ASSAY OF NATRIURETIC PEPTIDE: CPT

## 2021-01-01 PROCEDURE — 51701 INSERT BLADDER CATHETER: CPT

## 2021-01-01 PROCEDURE — 87088 URINE BACTERIA CULTURE: CPT

## 2021-01-01 PROCEDURE — 2500000003 HC RX 250 WO HCPCS: Performed by: RADIOLOGY

## 2021-01-01 PROCEDURE — 0202U NFCT DS 22 TRGT SARS-COV-2: CPT

## 2021-01-01 PROCEDURE — 92611 MOTION FLUOROSCOPY/SWALLOW: CPT

## 2021-01-01 PROCEDURE — 74230 X-RAY XM SWLNG FUNCJ C+: CPT

## 2021-01-01 PROCEDURE — 97535 SELF CARE MNGMENT TRAINING: CPT

## 2021-01-01 PROCEDURE — 85025 COMPLETE CBC W/AUTO DIFF WBC: CPT

## 2021-01-01 PROCEDURE — 85730 THROMBOPLASTIN TIME PARTIAL: CPT

## 2021-01-01 PROCEDURE — 83605 ASSAY OF LACTIC ACID: CPT

## 2021-01-01 PROCEDURE — 2580000003 HC RX 258: Performed by: EMERGENCY MEDICINE

## 2021-01-01 PROCEDURE — 87040 BLOOD CULTURE FOR BACTERIA: CPT

## 2021-01-01 PROCEDURE — 83615 LACTATE (LD) (LDH) ENZYME: CPT

## 2021-01-01 PROCEDURE — 6370000000 HC RX 637 (ALT 250 FOR IP): Performed by: EMERGENCY MEDICINE

## 2021-01-01 PROCEDURE — 99285 EMERGENCY DEPT VISIT HI MDM: CPT

## 2021-01-01 PROCEDURE — 93005 ELECTROCARDIOGRAM TRACING: CPT | Performed by: EMERGENCY MEDICINE

## 2021-01-01 PROCEDURE — 85610 PROTHROMBIN TIME: CPT

## 2021-01-01 PROCEDURE — 70450 CT HEAD/BRAIN W/O DYE: CPT

## 2021-01-01 PROCEDURE — 84484 ASSAY OF TROPONIN QUANT: CPT

## 2021-01-01 PROCEDURE — 80053 COMPREHEN METABOLIC PANEL: CPT

## 2021-01-01 PROCEDURE — 99223 1ST HOSP IP/OBS HIGH 75: CPT | Performed by: NURSE PRACTITIONER

## 2021-01-01 PROCEDURE — 81001 URINALYSIS AUTO W/SCOPE: CPT

## 2021-01-01 PROCEDURE — 86140 C-REACTIVE PROTEIN: CPT

## 2021-01-01 PROCEDURE — 71045 X-RAY EXAM CHEST 1 VIEW: CPT

## 2021-01-01 PROCEDURE — 82607 VITAMIN B-12: CPT

## 2021-01-01 PROCEDURE — 97166 OT EVAL MOD COMPLEX 45 MIN: CPT

## 2021-01-01 PROCEDURE — 82746 ASSAY OF FOLIC ACID SERUM: CPT

## 2021-01-01 PROCEDURE — 99232 SBSQ HOSP IP/OBS MODERATE 35: CPT | Performed by: NURSE PRACTITIONER

## 2021-01-01 PROCEDURE — 84145 PROCALCITONIN (PCT): CPT

## 2021-01-01 PROCEDURE — 92526 ORAL FUNCTION THERAPY: CPT

## 2021-01-01 RX ORDER — MELOXICAM 7.5 MG/1
7.5 TABLET ORAL DAILY
Status: ON HOLD | COMMUNITY
End: 2021-01-01 | Stop reason: HOSPADM

## 2021-01-01 RX ORDER — ROSUVASTATIN CALCIUM 10 MG/1
10 TABLET, COATED ORAL DAILY
Status: DISCONTINUED | OUTPATIENT
Start: 2021-01-01 | End: 2021-01-01 | Stop reason: HOSPADM

## 2021-01-01 RX ORDER — FLUTICASONE PROPIONATE 50 MCG
1 SPRAY, SUSPENSION (ML) NASAL DAILY
Status: DISCONTINUED | OUTPATIENT
Start: 2021-01-01 | End: 2021-01-01 | Stop reason: HOSPADM

## 2021-01-01 RX ORDER — PROMETHAZINE HYDROCHLORIDE 25 MG/1
12.5 TABLET ORAL EVERY 6 HOURS PRN
Status: DISCONTINUED | OUTPATIENT
Start: 2021-01-01 | End: 2021-01-01 | Stop reason: HOSPADM

## 2021-01-01 RX ORDER — SODIUM CHLORIDE 0.9 % (FLUSH) 0.9 %
10 SYRINGE (ML) INJECTION EVERY 12 HOURS SCHEDULED
Status: DISCONTINUED | OUTPATIENT
Start: 2021-01-01 | End: 2021-01-01 | Stop reason: HOSPADM

## 2021-01-01 RX ORDER — METHYLPREDNISOLONE SODIUM SUCCINATE 40 MG/ML
20 INJECTION, POWDER, LYOPHILIZED, FOR SOLUTION INTRAMUSCULAR; INTRAVENOUS EVERY 12 HOURS
Status: DISCONTINUED | OUTPATIENT
Start: 2021-01-01 | End: 2021-01-01

## 2021-01-01 RX ORDER — MINERAL OIL/HYDROPHIL PETROLAT
OINTMENT (GRAM) TOPICAL 2 TIMES DAILY PRN
Status: DISCONTINUED | OUTPATIENT
Start: 2021-01-01 | End: 2021-01-01 | Stop reason: HOSPADM

## 2021-01-01 RX ORDER — ACETAMINOPHEN 325 MG/1
650 TABLET ORAL EVERY 6 HOURS PRN
Status: DISCONTINUED | OUTPATIENT
Start: 2021-01-01 | End: 2021-01-01 | Stop reason: HOSPADM

## 2021-01-01 RX ORDER — METHYLPREDNISOLONE SODIUM SUCCINATE 40 MG/ML
40 INJECTION, POWDER, LYOPHILIZED, FOR SOLUTION INTRAMUSCULAR; INTRAVENOUS EVERY 8 HOURS
Status: DISCONTINUED | OUTPATIENT
Start: 2021-01-01 | End: 2021-01-01

## 2021-01-01 RX ORDER — LORAZEPAM 1 MG/1
1 TABLET ORAL EVERY 4 HOURS PRN
Qty: 18 TABLET | Refills: 0 | Status: SHIPPED | OUTPATIENT
Start: 2021-01-01 | End: 2021-01-01

## 2021-01-01 RX ORDER — GLYCOPYRROLATE 1 MG/1
1 TABLET ORAL EVERY 4 HOURS PRN
Qty: 18 TABLET | Refills: 0 | Status: SHIPPED | OUTPATIENT
Start: 2021-01-01 | End: 2021-01-01

## 2021-01-01 RX ORDER — DOXYCYCLINE HYCLATE 100 MG
100 TABLET ORAL 2 TIMES DAILY
Status: ON HOLD | COMMUNITY
Start: 2021-01-01 | End: 2021-01-01 | Stop reason: HOSPADM

## 2021-01-01 RX ORDER — IPRATROPIUM BROMIDE AND ALBUTEROL SULFATE 2.5; .5 MG/3ML; MG/3ML
1 SOLUTION RESPIRATORY (INHALATION) ONCE
Status: COMPLETED | OUTPATIENT
Start: 2021-01-01 | End: 2021-01-01

## 2021-01-01 RX ORDER — IPRATROPIUM BROMIDE AND ALBUTEROL SULFATE 2.5; .5 MG/3ML; MG/3ML
1 SOLUTION RESPIRATORY (INHALATION)
Status: DISCONTINUED | OUTPATIENT
Start: 2021-01-01 | End: 2021-01-01 | Stop reason: HOSPADM

## 2021-01-01 RX ORDER — POLYETHYLENE GLYCOL 3350 17 G/17G
17 POWDER, FOR SOLUTION ORAL DAILY PRN
Status: DISCONTINUED | OUTPATIENT
Start: 2021-01-01 | End: 2021-01-01 | Stop reason: HOSPADM

## 2021-01-01 RX ORDER — MORPHINE SULFATE 100 MG/5ML
5 SOLUTION ORAL EVERY 4 HOURS PRN
Qty: 1 BOTTLE | Refills: 0 | Status: SHIPPED | OUTPATIENT
Start: 2021-01-01 | End: 2021-01-01

## 2021-01-01 RX ORDER — DEXTROSE MONOHYDRATE 50 MG/ML
100 INJECTION, SOLUTION INTRAVENOUS PRN
Status: DISCONTINUED | OUTPATIENT
Start: 2021-01-01 | End: 2021-01-01 | Stop reason: HOSPADM

## 2021-01-01 RX ORDER — OMEPRAZOLE 20 MG/1
20 CAPSULE, DELAYED RELEASE ORAL DAILY
Status: ON HOLD | COMMUNITY
End: 2021-01-01 | Stop reason: HOSPADM

## 2021-01-01 RX ORDER — 0.9 % SODIUM CHLORIDE 0.9 %
1000 INTRAVENOUS SOLUTION INTRAVENOUS ONCE
Status: COMPLETED | OUTPATIENT
Start: 2021-01-01 | End: 2021-01-01

## 2021-01-01 RX ORDER — INSULIN GLARGINE 100 [IU]/ML
3 INJECTION, SOLUTION SUBCUTANEOUS NIGHTLY
Status: DISCONTINUED | OUTPATIENT
Start: 2021-01-01 | End: 2021-01-01

## 2021-01-01 RX ORDER — DEXTROSE MONOHYDRATE 25 G/50ML
12.5 INJECTION, SOLUTION INTRAVENOUS PRN
Status: DISCONTINUED | OUTPATIENT
Start: 2021-01-01 | End: 2021-01-01 | Stop reason: HOSPADM

## 2021-01-01 RX ORDER — ROSUVASTATIN CALCIUM 10 MG/1
10 TABLET, COATED ORAL DAILY
Status: ON HOLD | COMMUNITY
End: 2021-01-01 | Stop reason: HOSPADM

## 2021-01-01 RX ORDER — DONEPEZIL HYDROCHLORIDE 23 MG/1
23 TABLET, FILM COATED ORAL NIGHTLY
Status: ON HOLD | COMMUNITY
End: 2021-01-01 | Stop reason: HOSPADM

## 2021-01-01 RX ORDER — PANTOPRAZOLE SODIUM 40 MG/1
40 TABLET, DELAYED RELEASE ORAL
Status: DISCONTINUED | OUTPATIENT
Start: 2021-01-01 | End: 2021-01-01 | Stop reason: HOSPADM

## 2021-01-01 RX ORDER — OMEPRAZOLE 20 MG/1
20 CAPSULE, DELAYED RELEASE ORAL DAILY
Status: DISCONTINUED | OUTPATIENT
Start: 2021-01-01 | End: 2021-01-01 | Stop reason: CLARIF

## 2021-01-01 RX ORDER — INSULIN GLARGINE 100 [IU]/ML
5 INJECTION, SOLUTION SUBCUTANEOUS NIGHTLY
Status: DISCONTINUED | OUTPATIENT
Start: 2021-01-01 | End: 2021-01-01

## 2021-01-01 RX ORDER — FLUTICASONE PROPIONATE 50 MCG
1 SPRAY, SUSPENSION (ML) NASAL DAILY
Status: ON HOLD | COMMUNITY
End: 2021-01-01 | Stop reason: HOSPADM

## 2021-01-01 RX ORDER — PREDNISONE 10 MG/1
10 TABLET ORAL DAILY
Status: DISCONTINUED | OUTPATIENT
Start: 2021-01-01 | End: 2021-01-01 | Stop reason: HOSPADM

## 2021-01-01 RX ORDER — ACETAMINOPHEN 325 MG/1
650 TABLET ORAL EVERY 4 HOURS PRN
Status: DISCONTINUED | OUTPATIENT
Start: 2021-01-01 | End: 2021-01-01 | Stop reason: ALTCHOICE

## 2021-01-01 RX ORDER — SODIUM CHLORIDE 0.9 % (FLUSH) 0.9 %
10 SYRINGE (ML) INJECTION EVERY 12 HOURS SCHEDULED
Status: DISCONTINUED | OUTPATIENT
Start: 2021-01-01 | End: 2021-01-01 | Stop reason: SDUPTHER

## 2021-01-01 RX ORDER — SODIUM CHLORIDE 0.9 % (FLUSH) 0.9 %
10 SYRINGE (ML) INJECTION PRN
Status: DISCONTINUED | OUTPATIENT
Start: 2021-01-01 | End: 2021-01-01 | Stop reason: HOSPADM

## 2021-01-01 RX ORDER — NICOTINE POLACRILEX 4 MG
15 LOZENGE BUCCAL PRN
Status: DISCONTINUED | OUTPATIENT
Start: 2021-01-01 | End: 2021-01-01 | Stop reason: HOSPADM

## 2021-01-01 RX ORDER — ONDANSETRON 2 MG/ML
4 INJECTION INTRAMUSCULAR; INTRAVENOUS EVERY 6 HOURS PRN
Status: DISCONTINUED | OUTPATIENT
Start: 2021-01-01 | End: 2021-01-01 | Stop reason: HOSPADM

## 2021-01-01 RX ORDER — DONEPEZIL HYDROCHLORIDE 5 MG/1
20 TABLET, FILM COATED ORAL NIGHTLY
Status: DISCONTINUED | OUTPATIENT
Start: 2021-01-01 | End: 2021-01-01 | Stop reason: HOSPADM

## 2021-01-01 RX ORDER — BUMETANIDE 1 MG/1
1 TABLET ORAL DAILY
Status: DISCONTINUED | OUTPATIENT
Start: 2021-01-01 | End: 2021-01-01

## 2021-01-01 RX ORDER — INSULIN ASPART 100 [IU]/ML
2 INJECTION, SOLUTION INTRAVENOUS; SUBCUTANEOUS
Status: ON HOLD | COMMUNITY
End: 2021-01-01 | Stop reason: HOSPADM

## 2021-01-01 RX ORDER — ACETAMINOPHEN 650 MG/1
650 SUPPOSITORY RECTAL EVERY 6 HOURS PRN
Status: DISCONTINUED | OUTPATIENT
Start: 2021-01-01 | End: 2021-01-01 | Stop reason: HOSPADM

## 2021-01-01 RX ORDER — METHYLPREDNISOLONE SODIUM SUCCINATE 40 MG/ML
40 INJECTION, POWDER, LYOPHILIZED, FOR SOLUTION INTRAMUSCULAR; INTRAVENOUS EVERY 12 HOURS
Status: DISCONTINUED | OUTPATIENT
Start: 2021-01-01 | End: 2021-01-01

## 2021-01-01 RX ORDER — BUMETANIDE 1 MG/1
1 TABLET ORAL DAILY
Status: ON HOLD | COMMUNITY
End: 2021-01-01 | Stop reason: HOSPADM

## 2021-01-01 RX ORDER — INSULIN GLARGINE 100 [IU]/ML
10 INJECTION, SOLUTION SUBCUTANEOUS NIGHTLY
Status: DISCONTINUED | OUTPATIENT
Start: 2021-01-01 | End: 2021-01-01

## 2021-01-01 RX ORDER — INSULIN DEGLUDEC INJECTION 100 U/ML
10 INJECTION, SOLUTION SUBCUTANEOUS 2 TIMES DAILY
Status: ON HOLD | COMMUNITY
End: 2021-01-01 | Stop reason: HOSPADM

## 2021-01-01 RX ORDER — SODIUM CHLORIDE 0.9 % (FLUSH) 0.9 %
10 SYRINGE (ML) INJECTION PRN
Status: DISCONTINUED | OUTPATIENT
Start: 2021-01-01 | End: 2021-01-01 | Stop reason: SDUPTHER

## 2021-01-01 RX ORDER — METHYLPREDNISOLONE SODIUM SUCCINATE 125 MG/2ML
125 INJECTION, POWDER, LYOPHILIZED, FOR SOLUTION INTRAMUSCULAR; INTRAVENOUS ONCE
Status: COMPLETED | OUTPATIENT
Start: 2021-01-01 | End: 2021-01-01

## 2021-01-01 RX ORDER — PREDNISOLONE ACETATE 10 MG/ML
1 SUSPENSION/ DROPS OPHTHALMIC DAILY
Status: DISCONTINUED | OUTPATIENT
Start: 2021-01-01 | End: 2021-01-01 | Stop reason: HOSPADM

## 2021-01-01 RX ORDER — UMECLIDINIUM 62.5 UG/1
1 AEROSOL, POWDER ORAL DAILY
Status: ON HOLD | COMMUNITY
End: 2021-01-01 | Stop reason: HOSPADM

## 2021-01-01 RX ORDER — INSULIN GLARGINE 100 [IU]/ML
5 INJECTION, SOLUTION SUBCUTANEOUS NIGHTLY
Status: DISCONTINUED | OUTPATIENT
Start: 2021-01-01 | End: 2021-01-01 | Stop reason: HOSPADM

## 2021-01-01 RX ORDER — ARFORMOTEROL TARTRATE 15 UG/2ML
15 SOLUTION RESPIRATORY (INHALATION) 2 TIMES DAILY
Status: DISCONTINUED | OUTPATIENT
Start: 2021-01-01 | End: 2021-01-01 | Stop reason: HOSPADM

## 2021-01-01 RX ADMIN — Medication 10 ML: at 16:45

## 2021-01-01 RX ADMIN — ENOXAPARIN SODIUM 30 MG: 30 INJECTION SUBCUTANEOUS at 09:25

## 2021-01-01 RX ADMIN — INSULIN LISPRO 4 UNITS: 100 INJECTION, SOLUTION INTRAVENOUS; SUBCUTANEOUS at 06:07

## 2021-01-01 RX ADMIN — ARFORMOTEROL TARTRATE 15 MCG: 15 SOLUTION RESPIRATORY (INHALATION) at 19:34

## 2021-01-01 RX ADMIN — METHYLPREDNISOLONE SODIUM SUCCINATE 20 MG: 40 INJECTION, POWDER, FOR SOLUTION INTRAMUSCULAR; INTRAVENOUS at 08:12

## 2021-01-01 RX ADMIN — PROMETHAZINE HYDROCHLORIDE 12.5 MG: 25 TABLET ORAL at 19:51

## 2021-01-01 RX ADMIN — IPRATROPIUM BROMIDE AND ALBUTEROL SULFATE 1 AMPULE: .5; 3 SOLUTION RESPIRATORY (INHALATION) at 10:22

## 2021-01-01 RX ADMIN — FLUTICASONE PROPIONATE 1 SPRAY: 50 SPRAY, METERED NASAL at 10:09

## 2021-01-01 RX ADMIN — PREDNISOLONE ACETATE 1 DROP: 10 SUSPENSION/ DROPS OPHTHALMIC at 09:21

## 2021-01-01 RX ADMIN — INSULIN LISPRO 3 UNITS: 100 INJECTION, SOLUTION INTRAVENOUS; SUBCUTANEOUS at 20:53

## 2021-01-01 RX ADMIN — PREDNISOLONE ACETATE 1 DROP: 10 SUSPENSION/ DROPS OPHTHALMIC at 08:51

## 2021-01-01 RX ADMIN — DOXYCYCLINE 100 MG: 100 INJECTION, POWDER, LYOPHILIZED, FOR SOLUTION INTRAVENOUS at 11:10

## 2021-01-01 RX ADMIN — Medication 10 ML: at 10:09

## 2021-01-01 RX ADMIN — ARFORMOTEROL TARTRATE 15 MCG: 15 SOLUTION RESPIRATORY (INHALATION) at 12:11

## 2021-01-01 RX ADMIN — INSULIN GLARGINE 10 UNITS: 100 INJECTION, SOLUTION SUBCUTANEOUS at 20:05

## 2021-01-01 RX ADMIN — INSULIN LISPRO 4 UNITS: 100 INJECTION, SOLUTION INTRAVENOUS; SUBCUTANEOUS at 12:31

## 2021-01-01 RX ADMIN — INSULIN GLARGINE 3 UNITS: 100 INJECTION, SOLUTION SUBCUTANEOUS at 20:39

## 2021-01-01 RX ADMIN — ROSUVASTATIN 10 MG: 10 TABLET, FILM COATED ORAL at 08:53

## 2021-01-01 RX ADMIN — Medication 10 ML: at 21:21

## 2021-01-01 RX ADMIN — METHYLPREDNISOLONE SODIUM SUCCINATE 20 MG: 40 INJECTION, POWDER, FOR SOLUTION INTRAMUSCULAR; INTRAVENOUS at 19:59

## 2021-01-01 RX ADMIN — IPRATROPIUM BROMIDE AND ALBUTEROL SULFATE 1 AMPULE: .5; 3 SOLUTION RESPIRATORY (INHALATION) at 08:00

## 2021-01-01 RX ADMIN — ENOXAPARIN SODIUM 30 MG: 30 INJECTION SUBCUTANEOUS at 09:09

## 2021-01-01 RX ADMIN — MICONAZOLE NITRATE: 2 OINTMENT TOPICAL at 19:55

## 2021-01-01 RX ADMIN — METHYLPREDNISOLONE SODIUM SUCCINATE 20 MG: 40 INJECTION, POWDER, FOR SOLUTION INTRAMUSCULAR; INTRAVENOUS at 08:51

## 2021-01-01 RX ADMIN — ROSUVASTATIN 10 MG: 10 TABLET, FILM COATED ORAL at 07:49

## 2021-01-01 RX ADMIN — ROSUVASTATIN 10 MG: 10 TABLET, FILM COATED ORAL at 09:57

## 2021-01-01 RX ADMIN — ACETAMINOPHEN 650 MG: 325 TABLET ORAL at 08:50

## 2021-01-01 RX ADMIN — PREDNISONE 10 MG: 10 TABLET ORAL at 10:07

## 2021-01-01 RX ADMIN — FLUTICASONE PROPIONATE 1 SPRAY: 50 SPRAY, METERED NASAL at 08:12

## 2021-01-01 RX ADMIN — IPRATROPIUM BROMIDE AND ALBUTEROL SULFATE 1 AMPULE: .5; 3 SOLUTION RESPIRATORY (INHALATION) at 15:56

## 2021-01-01 RX ADMIN — FLUTICASONE PROPIONATE 1 SPRAY: 50 SPRAY, METERED NASAL at 07:33

## 2021-01-01 RX ADMIN — DONEPEZIL HYDROCHLORIDE 20 MG: 5 TABLET, FILM COATED ORAL at 20:04

## 2021-01-01 RX ADMIN — FLUTICASONE PROPIONATE 1 SPRAY: 50 SPRAY, METERED NASAL at 07:50

## 2021-01-01 RX ADMIN — ARFORMOTEROL TARTRATE 15 MCG: 15 SOLUTION RESPIRATORY (INHALATION) at 20:36

## 2021-01-01 RX ADMIN — ENOXAPARIN SODIUM 40 MG: 40 INJECTION SUBCUTANEOUS at 08:11

## 2021-01-01 RX ADMIN — Medication 10 ML: at 09:38

## 2021-01-01 RX ADMIN — PREDNISOLONE ACETATE 1 DROP: 10 SUSPENSION/ DROPS OPHTHALMIC at 08:54

## 2021-01-01 RX ADMIN — PANTOPRAZOLE SODIUM 40 MG: 40 TABLET, DELAYED RELEASE ORAL at 09:21

## 2021-01-01 RX ADMIN — ROSUVASTATIN 10 MG: 10 TABLET, FILM COATED ORAL at 10:37

## 2021-01-01 RX ADMIN — CEFTRIAXONE SODIUM 1 G: 1 INJECTION, POWDER, FOR SOLUTION INTRAMUSCULAR; INTRAVENOUS at 11:26

## 2021-01-01 RX ADMIN — ENOXAPARIN SODIUM 40 MG: 40 INJECTION SUBCUTANEOUS at 08:32

## 2021-01-01 RX ADMIN — FLUTICASONE PROPIONATE 1 SPRAY: 50 SPRAY, METERED NASAL at 08:47

## 2021-01-01 RX ADMIN — PREDNISONE 10 MG: 10 TABLET ORAL at 17:04

## 2021-01-01 RX ADMIN — PANTOPRAZOLE SODIUM 40 MG: 40 TABLET, DELAYED RELEASE ORAL at 10:07

## 2021-01-01 RX ADMIN — PANTOPRAZOLE SODIUM 40 MG: 40 TABLET, DELAYED RELEASE ORAL at 08:11

## 2021-01-01 RX ADMIN — DONEPEZIL HYDROCHLORIDE 20 MG: 5 TABLET, FILM COATED ORAL at 19:31

## 2021-01-01 RX ADMIN — Medication 10 ML: at 21:06

## 2021-01-01 RX ADMIN — INSULIN LISPRO 1 UNITS: 100 INJECTION, SOLUTION INTRAVENOUS; SUBCUTANEOUS at 06:01

## 2021-01-01 RX ADMIN — FLUTICASONE PROPIONATE 1 SPRAY: 50 SPRAY, METERED NASAL at 08:51

## 2021-01-01 RX ADMIN — Medication 10 ML: at 19:32

## 2021-01-01 RX ADMIN — MICONAZOLE NITRATE: 2 OINTMENT TOPICAL at 08:32

## 2021-01-01 RX ADMIN — DOXYCYCLINE 100 MG: 100 INJECTION, POWDER, LYOPHILIZED, FOR SOLUTION INTRAVENOUS at 12:06

## 2021-01-01 RX ADMIN — DOXYCYCLINE 100 MG: 100 INJECTION, POWDER, LYOPHILIZED, FOR SOLUTION INTRAVENOUS at 12:28

## 2021-01-01 RX ADMIN — MICONAZOLE NITRATE: 2 OINTMENT TOPICAL at 21:30

## 2021-01-01 RX ADMIN — INSULIN GLARGINE 5 UNITS: 100 INJECTION, SOLUTION SUBCUTANEOUS at 20:53

## 2021-01-01 RX ADMIN — FLUTICASONE PROPIONATE 1 SPRAY: 50 SPRAY, METERED NASAL at 09:23

## 2021-01-01 RX ADMIN — Medication 10 ML: at 08:46

## 2021-01-01 RX ADMIN — FLUTICASONE PROPIONATE 1 SPRAY: 50 SPRAY, METERED NASAL at 08:53

## 2021-01-01 RX ADMIN — BARIUM SULFATE 15 ML: 400 PASTE ORAL at 14:14

## 2021-01-01 RX ADMIN — IPRATROPIUM BROMIDE AND ALBUTEROL SULFATE 1 AMPULE: .5; 3 SOLUTION RESPIRATORY (INHALATION) at 19:27

## 2021-01-01 RX ADMIN — INSULIN LISPRO 4 UNITS: 100 INJECTION, SOLUTION INTRAVENOUS; SUBCUTANEOUS at 13:25

## 2021-01-01 RX ADMIN — IPRATROPIUM BROMIDE AND ALBUTEROL SULFATE 1 AMPULE: .5; 3 SOLUTION RESPIRATORY (INHALATION) at 08:20

## 2021-01-01 RX ADMIN — IPRATROPIUM BROMIDE AND ALBUTEROL SULFATE 1 AMPULE: .5; 3 SOLUTION RESPIRATORY (INHALATION) at 12:42

## 2021-01-01 RX ADMIN — IPRATROPIUM BROMIDE AND ALBUTEROL SULFATE 1 AMPULE: .5; 3 SOLUTION RESPIRATORY (INHALATION) at 21:40

## 2021-01-01 RX ADMIN — INSULIN LISPRO 4 UNITS: 100 INJECTION, SOLUTION INTRAVENOUS; SUBCUTANEOUS at 16:03

## 2021-01-01 RX ADMIN — IPRATROPIUM BROMIDE AND ALBUTEROL SULFATE 1 AMPULE: .5; 3 SOLUTION RESPIRATORY (INHALATION) at 16:15

## 2021-01-01 RX ADMIN — IPRATROPIUM BROMIDE AND ALBUTEROL SULFATE 1 AMPULE: .5; 3 SOLUTION RESPIRATORY (INHALATION) at 08:41

## 2021-01-01 RX ADMIN — Medication 10 ML: at 20:00

## 2021-01-01 RX ADMIN — METHYLPREDNISOLONE SODIUM SUCCINATE 40 MG: 40 INJECTION, POWDER, FOR SOLUTION INTRAMUSCULAR; INTRAVENOUS at 09:07

## 2021-01-01 RX ADMIN — PANTOPRAZOLE SODIUM 40 MG: 40 TABLET, DELAYED RELEASE ORAL at 05:26

## 2021-01-01 RX ADMIN — PANTOPRAZOLE SODIUM 40 MG: 40 TABLET, DELAYED RELEASE ORAL at 05:38

## 2021-01-01 RX ADMIN — INSULIN LISPRO 4 UNITS: 100 INJECTION, SOLUTION INTRAVENOUS; SUBCUTANEOUS at 17:05

## 2021-01-01 RX ADMIN — ROSUVASTATIN 10 MG: 10 TABLET, FILM COATED ORAL at 09:38

## 2021-01-01 RX ADMIN — PANTOPRAZOLE SODIUM 40 MG: 40 TABLET, DELAYED RELEASE ORAL at 06:40

## 2021-01-01 RX ADMIN — IPRATROPIUM BROMIDE AND ALBUTEROL SULFATE 1 AMPULE: .5; 3 SOLUTION RESPIRATORY (INHALATION) at 12:14

## 2021-01-01 RX ADMIN — IPRATROPIUM BROMIDE AND ALBUTEROL SULFATE 1 AMPULE: .5; 3 SOLUTION RESPIRATORY (INHALATION) at 08:44

## 2021-01-01 RX ADMIN — Medication 10 ML: at 20:52

## 2021-01-01 RX ADMIN — MICONAZOLE NITRATE: 2 OINTMENT TOPICAL at 22:07

## 2021-01-01 RX ADMIN — METHYLPREDNISOLONE SODIUM SUCCINATE 20 MG: 40 INJECTION, POWDER, FOR SOLUTION INTRAMUSCULAR; INTRAVENOUS at 20:56

## 2021-01-01 RX ADMIN — DOXYCYCLINE 100 MG: 100 INJECTION, POWDER, LYOPHILIZED, FOR SOLUTION INTRAVENOUS at 23:09

## 2021-01-01 RX ADMIN — DOXYCYCLINE 100 MG: 100 INJECTION, POWDER, LYOPHILIZED, FOR SOLUTION INTRAVENOUS at 23:06

## 2021-01-01 RX ADMIN — METHYLPREDNISOLONE SODIUM SUCCINATE 40 MG: 40 INJECTION, POWDER, FOR SOLUTION INTRAMUSCULAR; INTRAVENOUS at 16:16

## 2021-01-01 RX ADMIN — ENOXAPARIN SODIUM 40 MG: 40 INJECTION SUBCUTANEOUS at 08:30

## 2021-01-01 RX ADMIN — PREDNISONE 10 MG: 10 TABLET ORAL at 10:37

## 2021-01-01 RX ADMIN — Medication 10 ML: at 08:11

## 2021-01-01 RX ADMIN — DOXYCYCLINE 100 MG: 100 INJECTION, POWDER, LYOPHILIZED, FOR SOLUTION INTRAVENOUS at 11:16

## 2021-01-01 RX ADMIN — CEFTRIAXONE SODIUM 1 G: 1 INJECTION, POWDER, FOR SOLUTION INTRAMUSCULAR; INTRAVENOUS at 11:09

## 2021-01-01 RX ADMIN — DONEPEZIL HYDROCHLORIDE 20 MG: 5 TABLET, FILM COATED ORAL at 19:51

## 2021-01-01 RX ADMIN — INSULIN LISPRO 1 UNITS: 100 INJECTION, SOLUTION INTRAVENOUS; SUBCUTANEOUS at 19:32

## 2021-01-01 RX ADMIN — INSULIN LISPRO 8 UNITS: 100 INJECTION, SOLUTION INTRAVENOUS; SUBCUTANEOUS at 17:42

## 2021-01-01 RX ADMIN — Medication 10 ML: at 08:50

## 2021-01-01 RX ADMIN — IPRATROPIUM BROMIDE AND ALBUTEROL SULFATE 1 AMPULE: .5; 3 SOLUTION RESPIRATORY (INHALATION) at 16:27

## 2021-01-01 RX ADMIN — DONEPEZIL HYDROCHLORIDE 20 MG: 5 TABLET, FILM COATED ORAL at 21:39

## 2021-01-01 RX ADMIN — IPRATROPIUM BROMIDE AND ALBUTEROL SULFATE 1 AMPULE: .5; 3 SOLUTION RESPIRATORY (INHALATION) at 16:17

## 2021-01-01 RX ADMIN — IPRATROPIUM BROMIDE AND ALBUTEROL SULFATE 1 AMPULE: .5; 3 SOLUTION RESPIRATORY (INHALATION) at 11:45

## 2021-01-01 RX ADMIN — IPRATROPIUM BROMIDE AND ALBUTEROL SULFATE 1 AMPULE: .5; 3 SOLUTION RESPIRATORY (INHALATION) at 21:25

## 2021-01-01 RX ADMIN — INSULIN GLARGINE 5 UNITS: 100 INJECTION, SOLUTION SUBCUTANEOUS at 20:07

## 2021-01-01 RX ADMIN — IPRATROPIUM BROMIDE AND ALBUTEROL SULFATE 1 AMPULE: .5; 3 SOLUTION RESPIRATORY (INHALATION) at 19:37

## 2021-01-01 RX ADMIN — INSULIN LISPRO 6 UNITS: 100 INJECTION, SOLUTION INTRAVENOUS; SUBCUTANEOUS at 17:41

## 2021-01-01 RX ADMIN — FLUTICASONE PROPIONATE 1 SPRAY: 50 SPRAY, METERED NASAL at 10:38

## 2021-01-01 RX ADMIN — IPRATROPIUM BROMIDE AND ALBUTEROL SULFATE 1 AMPULE: .5; 3 SOLUTION RESPIRATORY (INHALATION) at 07:44

## 2021-01-01 RX ADMIN — METHYLPREDNISOLONE SODIUM SUCCINATE 40 MG: 40 INJECTION, POWDER, FOR SOLUTION INTRAMUSCULAR; INTRAVENOUS at 00:12

## 2021-01-01 RX ADMIN — Medication 10 ML: at 09:26

## 2021-01-01 RX ADMIN — IPRATROPIUM BROMIDE AND ALBUTEROL SULFATE 1 AMPULE: .5; 3 SOLUTION RESPIRATORY (INHALATION) at 19:41

## 2021-01-01 RX ADMIN — INSULIN GLARGINE 10 UNITS: 100 INJECTION, SOLUTION SUBCUTANEOUS at 07:00

## 2021-01-01 RX ADMIN — IPRATROPIUM BROMIDE AND ALBUTEROL SULFATE 1 AMPULE: .5; 3 SOLUTION RESPIRATORY (INHALATION) at 20:26

## 2021-01-01 RX ADMIN — INSULIN LISPRO 5 UNITS: 100 INJECTION, SOLUTION INTRAVENOUS; SUBCUTANEOUS at 21:08

## 2021-01-01 RX ADMIN — DONEPEZIL HYDROCHLORIDE 20 MG: 5 TABLET, FILM COATED ORAL at 19:55

## 2021-01-01 RX ADMIN — PREDNISOLONE ACETATE 1 DROP: 10 SUSPENSION/ DROPS OPHTHALMIC at 09:23

## 2021-01-01 RX ADMIN — ROSUVASTATIN 10 MG: 10 TABLET, FILM COATED ORAL at 09:22

## 2021-01-01 RX ADMIN — PREDNISOLONE ACETATE 1 DROP: 10 SUSPENSION/ DROPS OPHTHALMIC at 08:08

## 2021-01-01 RX ADMIN — Medication 10 ML: at 04:33

## 2021-01-01 RX ADMIN — ROSUVASTATIN 10 MG: 10 TABLET, FILM COATED ORAL at 09:09

## 2021-01-01 RX ADMIN — PREDNISOLONE ACETATE 1 DROP: 10 SUSPENSION/ DROPS OPHTHALMIC at 09:09

## 2021-01-01 RX ADMIN — INSULIN LISPRO 12 UNITS: 100 INJECTION, SOLUTION INTRAVENOUS; SUBCUTANEOUS at 17:29

## 2021-01-01 RX ADMIN — SODIUM CHLORIDE 1000 ML: 9 INJECTION, SOLUTION INTRAVENOUS at 13:30

## 2021-01-01 RX ADMIN — IPRATROPIUM BROMIDE AND ALBUTEROL SULFATE 1 AMPULE: .5; 3 SOLUTION RESPIRATORY (INHALATION) at 12:31

## 2021-01-01 RX ADMIN — FLUTICASONE PROPIONATE 1 SPRAY: 50 SPRAY, METERED NASAL at 09:39

## 2021-01-01 RX ADMIN — INSULIN GLARGINE 10 UNITS: 100 INJECTION, SOLUTION SUBCUTANEOUS at 19:32

## 2021-01-01 RX ADMIN — ENOXAPARIN SODIUM 40 MG: 40 INJECTION SUBCUTANEOUS at 07:49

## 2021-01-01 RX ADMIN — BARIUM SULFATE 15 ML: 400 SUSPENSION ORAL at 14:14

## 2021-01-01 RX ADMIN — METHYLPREDNISOLONE SODIUM SUCCINATE 40 MG: 40 INJECTION, POWDER, FOR SOLUTION INTRAMUSCULAR; INTRAVENOUS at 01:02

## 2021-01-01 RX ADMIN — PREDNISOLONE ACETATE 1 DROP: 10 SUSPENSION/ DROPS OPHTHALMIC at 08:12

## 2021-01-01 RX ADMIN — PANTOPRAZOLE SODIUM 40 MG: 40 TABLET, DELAYED RELEASE ORAL at 05:00

## 2021-01-01 RX ADMIN — DONEPEZIL HYDROCHLORIDE 20 MG: 5 TABLET, FILM COATED ORAL at 20:53

## 2021-01-01 RX ADMIN — INSULIN LISPRO 5 UNITS: 100 INJECTION, SOLUTION INTRAVENOUS; SUBCUTANEOUS at 20:07

## 2021-01-01 RX ADMIN — DEXTROSE MONOHYDRATE 12.5 G: 25 INJECTION, SOLUTION INTRAVENOUS at 06:30

## 2021-01-01 RX ADMIN — MICONAZOLE NITRATE: 2 OINTMENT TOPICAL at 07:51

## 2021-01-01 RX ADMIN — FLUTICASONE PROPIONATE 1 SPRAY: 50 SPRAY, METERED NASAL at 08:09

## 2021-01-01 RX ADMIN — MICONAZOLE NITRATE: 2 OINTMENT TOPICAL at 09:00

## 2021-01-01 RX ADMIN — Medication 10 ML: at 08:18

## 2021-01-01 RX ADMIN — IPRATROPIUM BROMIDE AND ALBUTEROL SULFATE 1 AMPULE: .5; 3 SOLUTION RESPIRATORY (INHALATION) at 13:50

## 2021-01-01 RX ADMIN — IPRATROPIUM BROMIDE AND ALBUTEROL SULFATE 1 AMPULE: .5; 3 SOLUTION RESPIRATORY (INHALATION) at 19:34

## 2021-01-01 RX ADMIN — CEFTRIAXONE SODIUM 1 G: 1 INJECTION, POWDER, FOR SOLUTION INTRAMUSCULAR; INTRAVENOUS at 11:30

## 2021-01-01 RX ADMIN — PREDNISOLONE ACETATE 1 DROP: 10 SUSPENSION/ DROPS OPHTHALMIC at 10:38

## 2021-01-01 RX ADMIN — MICONAZOLE NITRATE: 2 OINTMENT TOPICAL at 20:04

## 2021-01-01 RX ADMIN — IPRATROPIUM BROMIDE AND ALBUTEROL SULFATE 1 AMPULE: .5; 3 SOLUTION RESPIRATORY (INHALATION) at 16:30

## 2021-01-01 RX ADMIN — INSULIN LISPRO 2 UNITS: 100 INJECTION, SOLUTION INTRAVENOUS; SUBCUTANEOUS at 21:39

## 2021-01-01 RX ADMIN — INSULIN LISPRO 1 UNITS: 100 INJECTION, SOLUTION INTRAVENOUS; SUBCUTANEOUS at 20:56

## 2021-01-01 RX ADMIN — PANTOPRAZOLE SODIUM 40 MG: 40 TABLET, DELAYED RELEASE ORAL at 05:05

## 2021-01-01 RX ADMIN — PREDNISONE 10 MG: 10 TABLET ORAL at 08:47

## 2021-01-01 RX ADMIN — IPRATROPIUM BROMIDE AND ALBUTEROL SULFATE 1 AMPULE: .5; 3 SOLUTION RESPIRATORY (INHALATION) at 11:41

## 2021-01-01 RX ADMIN — IPRATROPIUM BROMIDE AND ALBUTEROL SULFATE 1 AMPULE: .5; 3 SOLUTION RESPIRATORY (INHALATION) at 09:18

## 2021-01-01 RX ADMIN — MICONAZOLE NITRATE: 2 OINTMENT TOPICAL at 19:58

## 2021-01-01 RX ADMIN — CEFTRIAXONE SODIUM 1 G: 1 INJECTION, POWDER, FOR SOLUTION INTRAMUSCULAR; INTRAVENOUS at 11:44

## 2021-01-01 RX ADMIN — IPRATROPIUM BROMIDE AND ALBUTEROL SULFATE 1 AMPULE: .5; 3 SOLUTION RESPIRATORY (INHALATION) at 16:29

## 2021-01-01 RX ADMIN — INSULIN GLARGINE 10 UNITS: 100 INJECTION, SOLUTION SUBCUTANEOUS at 21:39

## 2021-01-01 RX ADMIN — ENOXAPARIN SODIUM 40 MG: 40 INJECTION SUBCUTANEOUS at 08:48

## 2021-01-01 RX ADMIN — INSULIN LISPRO 6 UNITS: 100 INJECTION, SOLUTION INTRAVENOUS; SUBCUTANEOUS at 21:00

## 2021-01-01 RX ADMIN — IPRATROPIUM BROMIDE AND ALBUTEROL SULFATE 1 AMPULE: .5; 3 SOLUTION RESPIRATORY (INHALATION) at 12:54

## 2021-01-01 RX ADMIN — DONEPEZIL HYDROCHLORIDE 20 MG: 5 TABLET, FILM COATED ORAL at 21:21

## 2021-01-01 RX ADMIN — IPRATROPIUM BROMIDE AND ALBUTEROL SULFATE 1 AMPULE: .5; 3 SOLUTION RESPIRATORY (INHALATION) at 08:05

## 2021-01-01 RX ADMIN — ONDANSETRON 4 MG: 2 INJECTION INTRAMUSCULAR; INTRAVENOUS at 08:11

## 2021-01-01 RX ADMIN — DOXYCYCLINE 100 MG: 100 INJECTION, POWDER, LYOPHILIZED, FOR SOLUTION INTRAVENOUS at 23:16

## 2021-01-01 RX ADMIN — METHYLPREDNISOLONE SODIUM SUCCINATE 20 MG: 40 INJECTION, POWDER, FOR SOLUTION INTRAMUSCULAR; INTRAVENOUS at 09:26

## 2021-01-01 RX ADMIN — INSULIN LISPRO 3 UNITS: 100 INJECTION, SOLUTION INTRAVENOUS; SUBCUTANEOUS at 06:40

## 2021-01-01 RX ADMIN — ENOXAPARIN SODIUM 40 MG: 40 INJECTION SUBCUTANEOUS at 09:00

## 2021-01-01 RX ADMIN — Medication 10 ML: at 09:58

## 2021-01-01 RX ADMIN — BARIUM SULFATE 15 ML: 0.81 POWDER, FOR SUSPENSION ORAL at 14:14

## 2021-01-01 RX ADMIN — METHYLPREDNISOLONE SODIUM SUCCINATE 125 MG: 125 INJECTION, POWDER, FOR SOLUTION INTRAMUSCULAR; INTRAVENOUS at 16:45

## 2021-01-01 RX ADMIN — IPRATROPIUM BROMIDE AND ALBUTEROL SULFATE 1 AMPULE: .5; 3 SOLUTION RESPIRATORY (INHALATION) at 12:45

## 2021-01-01 RX ADMIN — ONDANSETRON 4 MG: 2 INJECTION INTRAMUSCULAR; INTRAVENOUS at 12:05

## 2021-01-01 RX ADMIN — ROSUVASTATIN 10 MG: 10 TABLET, FILM COATED ORAL at 08:47

## 2021-01-01 RX ADMIN — ENOXAPARIN SODIUM 40 MG: 40 INJECTION SUBCUTANEOUS at 08:52

## 2021-01-01 RX ADMIN — CEFTRIAXONE SODIUM 1 G: 1 INJECTION, POWDER, FOR SOLUTION INTRAMUSCULAR; INTRAVENOUS at 12:22

## 2021-01-01 RX ADMIN — FLUTICASONE PROPIONATE 1 SPRAY: 50 SPRAY, METERED NASAL at 09:09

## 2021-01-01 RX ADMIN — DOXYCYCLINE 100 MG: 100 INJECTION, POWDER, LYOPHILIZED, FOR SOLUTION INTRAVENOUS at 22:51

## 2021-01-01 RX ADMIN — INSULIN LISPRO 4 UNITS: 100 INJECTION, SOLUTION INTRAVENOUS; SUBCUTANEOUS at 17:16

## 2021-01-01 RX ADMIN — Medication 10 ML: at 09:08

## 2021-01-01 RX ADMIN — METHYLPREDNISOLONE SODIUM SUCCINATE 40 MG: 40 INJECTION, POWDER, FOR SOLUTION INTRAMUSCULAR; INTRAVENOUS at 21:21

## 2021-01-01 RX ADMIN — INSULIN LISPRO 2 UNITS: 100 INJECTION, SOLUTION INTRAVENOUS; SUBCUTANEOUS at 08:45

## 2021-01-01 RX ADMIN — ARFORMOTEROL TARTRATE 15 MCG: 15 SOLUTION RESPIRATORY (INHALATION) at 19:27

## 2021-01-01 RX ADMIN — IPRATROPIUM BROMIDE AND ALBUTEROL SULFATE 1 AMPULE: .5; 3 SOLUTION RESPIRATORY (INHALATION) at 16:05

## 2021-01-01 RX ADMIN — ROSUVASTATIN 10 MG: 10 TABLET, FILM COATED ORAL at 08:11

## 2021-01-01 RX ADMIN — Medication: at 11:26

## 2021-01-01 RX ADMIN — INSULIN LISPRO 3 UNITS: 100 INJECTION, SOLUTION INTRAVENOUS; SUBCUTANEOUS at 19:58

## 2021-01-01 RX ADMIN — DONEPEZIL HYDROCHLORIDE 20 MG: 5 TABLET, FILM COATED ORAL at 20:38

## 2021-01-01 RX ADMIN — INSULIN LISPRO 1 UNITS: 100 INJECTION, SOLUTION INTRAVENOUS; SUBCUTANEOUS at 19:59

## 2021-01-01 RX ADMIN — IPRATROPIUM BROMIDE AND ALBUTEROL SULFATE 1 AMPULE: .5; 3 SOLUTION RESPIRATORY (INHALATION) at 12:11

## 2021-01-01 RX ADMIN — ROSUVASTATIN 10 MG: 10 TABLET, FILM COATED ORAL at 08:50

## 2021-01-01 RX ADMIN — IPRATROPIUM BROMIDE AND ALBUTEROL SULFATE 1 AMPULE: .5; 2.5 SOLUTION RESPIRATORY (INHALATION) at 17:03

## 2021-01-01 RX ADMIN — DONEPEZIL HYDROCHLORIDE 20 MG: 5 TABLET, FILM COATED ORAL at 20:47

## 2021-01-01 RX ADMIN — INSULIN LISPRO 8 UNITS: 100 INJECTION, SOLUTION INTRAVENOUS; SUBCUTANEOUS at 11:41

## 2021-01-01 RX ADMIN — PREDNISOLONE ACETATE 1 DROP: 10 SUSPENSION/ DROPS OPHTHALMIC at 07:33

## 2021-01-01 RX ADMIN — Medication 10 ML: at 19:56

## 2021-01-01 RX ADMIN — MICONAZOLE NITRATE: 2 OINTMENT TOPICAL at 21:09

## 2021-01-01 RX ADMIN — INSULIN GLARGINE 10 UNITS: 100 INJECTION, SOLUTION SUBCUTANEOUS at 19:57

## 2021-01-01 RX ADMIN — IPRATROPIUM BROMIDE AND ALBUTEROL SULFATE 1 AMPULE: .5; 3 SOLUTION RESPIRATORY (INHALATION) at 12:00

## 2021-01-01 RX ADMIN — METHYLPREDNISOLONE SODIUM SUCCINATE 40 MG: 40 INJECTION, POWDER, FOR SOLUTION INTRAMUSCULAR; INTRAVENOUS at 09:09

## 2021-01-01 RX ADMIN — CEFTRIAXONE SODIUM 1 G: 1 INJECTION, POWDER, FOR SOLUTION INTRAMUSCULAR; INTRAVENOUS at 11:42

## 2021-01-01 RX ADMIN — ARFORMOTEROL TARTRATE 15 MCG: 15 SOLUTION RESPIRATORY (INHALATION) at 08:40

## 2021-01-01 RX ADMIN — MICONAZOLE NITRATE: 2 OINTMENT TOPICAL at 20:56

## 2021-01-01 RX ADMIN — PANTOPRAZOLE SODIUM 40 MG: 40 TABLET, DELAYED RELEASE ORAL at 05:02

## 2021-01-01 RX ADMIN — IPRATROPIUM BROMIDE AND ALBUTEROL SULFATE 1 AMPULE: .5; 3 SOLUTION RESPIRATORY (INHALATION) at 19:48

## 2021-01-01 RX ADMIN — PREDNISOLONE ACETATE 1 DROP: 10 SUSPENSION/ DROPS OPHTHALMIC at 08:32

## 2021-01-01 RX ADMIN — INSULIN LISPRO 5 UNITS: 100 INJECTION, SOLUTION INTRAVENOUS; SUBCUTANEOUS at 16:40

## 2021-01-01 RX ADMIN — METHYLPREDNISOLONE SODIUM SUCCINATE 40 MG: 40 INJECTION, POWDER, FOR SOLUTION INTRAMUSCULAR; INTRAVENOUS at 08:18

## 2021-01-01 RX ADMIN — PREDNISOLONE ACETATE 1 DROP: 10 SUSPENSION/ DROPS OPHTHALMIC at 10:09

## 2021-01-01 RX ADMIN — MICONAZOLE NITRATE: 2 OINTMENT TOPICAL at 09:39

## 2021-01-01 RX ADMIN — MICONAZOLE NITRATE: 2 OINTMENT TOPICAL at 08:11

## 2021-01-01 RX ADMIN — ENOXAPARIN SODIUM 30 MG: 30 INJECTION SUBCUTANEOUS at 08:50

## 2021-01-01 RX ADMIN — DONEPEZIL HYDROCHLORIDE 20 MG: 5 TABLET, FILM COATED ORAL at 07:00

## 2021-01-01 RX ADMIN — MICONAZOLE NITRATE: 2 OINTMENT TOPICAL at 08:00

## 2021-01-01 RX ADMIN — IPRATROPIUM BROMIDE AND ALBUTEROL SULFATE 1 AMPULE: .5; 3 SOLUTION RESPIRATORY (INHALATION) at 08:10

## 2021-01-01 RX ADMIN — CEFTRIAXONE SODIUM 1 G: 1 INJECTION, POWDER, FOR SOLUTION INTRAMUSCULAR; INTRAVENOUS at 12:07

## 2021-01-01 RX ADMIN — PREDNISOLONE ACETATE 1 DROP: 10 SUSPENSION/ DROPS OPHTHALMIC at 09:40

## 2021-01-01 RX ADMIN — ACETAMINOPHEN 650 MG: 325 TABLET ORAL at 03:06

## 2021-01-01 RX ADMIN — INSULIN LISPRO 1 UNITS: 100 INJECTION, SOLUTION INTRAVENOUS; SUBCUTANEOUS at 12:48

## 2021-01-01 RX ADMIN — MICONAZOLE NITRATE: 2 OINTMENT TOPICAL at 22:45

## 2021-01-01 RX ADMIN — ENOXAPARIN SODIUM 30 MG: 30 INJECTION SUBCUTANEOUS at 09:23

## 2021-01-01 RX ADMIN — DEXTROSE MONOHYDRATE 12.5 G: 25 INJECTION, SOLUTION INTRAVENOUS at 05:02

## 2021-01-01 RX ADMIN — INSULIN LISPRO 4 UNITS: 100 INJECTION, SOLUTION INTRAVENOUS; SUBCUTANEOUS at 11:12

## 2021-01-01 RX ADMIN — IPRATROPIUM BROMIDE AND ALBUTEROL SULFATE 1 AMPULE: .5; 3 SOLUTION RESPIRATORY (INHALATION) at 21:36

## 2021-01-01 RX ADMIN — IPRATROPIUM BROMIDE AND ALBUTEROL SULFATE 1 AMPULE: .5; 3 SOLUTION RESPIRATORY (INHALATION) at 20:36

## 2021-01-01 RX ADMIN — IPRATROPIUM BROMIDE AND ALBUTEROL SULFATE 1 AMPULE: .5; 3 SOLUTION RESPIRATORY (INHALATION) at 20:21

## 2021-01-01 RX ADMIN — ENOXAPARIN SODIUM 40 MG: 40 INJECTION SUBCUTANEOUS at 10:08

## 2021-01-01 RX ADMIN — PANTOPRAZOLE SODIUM 40 MG: 40 TABLET, DELAYED RELEASE ORAL at 06:09

## 2021-01-01 RX ADMIN — IPRATROPIUM BROMIDE AND ALBUTEROL SULFATE 1 AMPULE: .5; 3 SOLUTION RESPIRATORY (INHALATION) at 11:52

## 2021-01-01 RX ADMIN — INSULIN LISPRO 2 UNITS: 100 INJECTION, SOLUTION INTRAVENOUS; SUBCUTANEOUS at 11:26

## 2021-01-01 RX ADMIN — ROSUVASTATIN 10 MG: 10 TABLET, FILM COATED ORAL at 10:07

## 2021-01-01 RX ADMIN — INSULIN GLARGINE 5 UNITS: 100 INJECTION, SOLUTION SUBCUTANEOUS at 20:54

## 2021-01-01 RX ADMIN — MICONAZOLE NITRATE: 2 OINTMENT TOPICAL at 08:53

## 2021-01-01 RX ADMIN — Medication 10 ML: at 20:04

## 2021-01-01 RX ADMIN — IPRATROPIUM BROMIDE AND ALBUTEROL SULFATE 1 AMPULE: .5; 3 SOLUTION RESPIRATORY (INHALATION) at 08:24

## 2021-01-01 RX ADMIN — IPRATROPIUM BROMIDE AND ALBUTEROL SULFATE 1 AMPULE: .5; 3 SOLUTION RESPIRATORY (INHALATION) at 20:47

## 2021-01-01 RX ADMIN — ENOXAPARIN SODIUM 30 MG: 30 INJECTION SUBCUTANEOUS at 08:18

## 2021-01-01 RX ADMIN — ROSUVASTATIN 10 MG: 10 TABLET, FILM COATED ORAL at 08:32

## 2021-01-01 RX ADMIN — FLUTICASONE PROPIONATE 1 SPRAY: 50 SPRAY, METERED NASAL at 08:32

## 2021-01-01 RX ADMIN — PANTOPRAZOLE SODIUM 40 MG: 40 TABLET, DELAYED RELEASE ORAL at 08:30

## 2021-01-01 RX ADMIN — Medication 10 ML: at 07:52

## 2021-01-01 RX ADMIN — PANTOPRAZOLE SODIUM 40 MG: 40 TABLET, DELAYED RELEASE ORAL at 05:11

## 2021-01-01 RX ADMIN — INSULIN GLARGINE 10 UNITS: 100 INJECTION, SOLUTION SUBCUTANEOUS at 21:00

## 2021-01-01 RX ADMIN — MICONAZOLE NITRATE: 2 OINTMENT TOPICAL at 10:11

## 2021-01-01 RX ADMIN — DONEPEZIL HYDROCHLORIDE 20 MG: 5 TABLET, FILM COATED ORAL at 20:00

## 2021-01-01 RX ADMIN — DOXYCYCLINE 100 MG: 100 INJECTION, POWDER, LYOPHILIZED, FOR SOLUTION INTRAVENOUS at 11:26

## 2021-01-01 RX ADMIN — PREDNISOLONE ACETATE 1 DROP: 10 SUSPENSION/ DROPS OPHTHALMIC at 08:47

## 2021-01-01 RX ADMIN — DOXYCYCLINE 100 MG: 100 INJECTION, POWDER, LYOPHILIZED, FOR SOLUTION INTRAVENOUS at 12:23

## 2021-01-01 RX ADMIN — INSULIN LISPRO 4 UNITS: 100 INJECTION, SOLUTION INTRAVENOUS; SUBCUTANEOUS at 16:45

## 2021-01-01 RX ADMIN — IPRATROPIUM BROMIDE AND ALBUTEROL SULFATE 1 AMPULE: .5; 3 SOLUTION RESPIRATORY (INHALATION) at 12:13

## 2021-01-01 RX ADMIN — PREDNISOLONE ACETATE 1 DROP: 10 SUSPENSION/ DROPS OPHTHALMIC at 07:51

## 2021-01-01 RX ADMIN — MICONAZOLE NITRATE: 2 OINTMENT TOPICAL at 17:30

## 2021-01-01 RX ADMIN — IPRATROPIUM BROMIDE AND ALBUTEROL SULFATE 1 AMPULE: .5; 3 SOLUTION RESPIRATORY (INHALATION) at 17:06

## 2021-01-01 RX ADMIN — IPRATROPIUM BROMIDE AND ALBUTEROL SULFATE 1 AMPULE: .5; 3 SOLUTION RESPIRATORY (INHALATION) at 08:45

## 2021-01-01 RX ADMIN — IPRATROPIUM BROMIDE AND ALBUTEROL SULFATE 1 AMPULE: .5; 3 SOLUTION RESPIRATORY (INHALATION) at 16:19

## 2021-01-01 RX ADMIN — FLUTICASONE PROPIONATE 1 SPRAY: 50 SPRAY, METERED NASAL at 09:21

## 2021-01-01 RX ADMIN — ARFORMOTEROL TARTRATE 15 MCG: 15 SOLUTION RESPIRATORY (INHALATION) at 08:10

## 2021-01-01 RX ADMIN — MICONAZOLE NITRATE: 2 OINTMENT TOPICAL at 08:49

## 2021-01-01 RX ADMIN — Medication 10 ML: at 08:52

## 2021-01-01 RX ADMIN — Medication 10 ML: at 08:33

## 2021-01-01 RX ADMIN — INSULIN GLARGINE 5 UNITS: 100 INJECTION, SOLUTION SUBCUTANEOUS at 21:10

## 2021-01-01 RX ADMIN — INSULIN LISPRO 4 UNITS: 100 INJECTION, SOLUTION INTRAVENOUS; SUBCUTANEOUS at 20:06

## 2021-01-01 RX ADMIN — IPRATROPIUM BROMIDE AND ALBUTEROL SULFATE 1 AMPULE: .5; 3 SOLUTION RESPIRATORY (INHALATION) at 22:13

## 2021-01-01 RX ADMIN — DOXYCYCLINE 100 MG: 100 INJECTION, POWDER, LYOPHILIZED, FOR SOLUTION INTRAVENOUS at 22:53

## 2021-01-01 RX ADMIN — Medication 10 ML: at 09:09

## 2021-01-01 RX ADMIN — IPRATROPIUM BROMIDE AND ALBUTEROL SULFATE 1 AMPULE: .5; 3 SOLUTION RESPIRATORY (INHALATION) at 16:42

## 2021-01-01 RX ADMIN — Medication 10 ML: at 23:12

## 2021-01-01 RX ADMIN — Medication 10 ML: at 21:30

## 2021-01-01 RX ADMIN — IPRATROPIUM BROMIDE AND ALBUTEROL SULFATE 1 AMPULE: .5; 3 SOLUTION RESPIRATORY (INHALATION) at 08:40

## 2021-01-01 RX ADMIN — DEXTROSE MONOHYDRATE 12.5 G: 25 INJECTION, SOLUTION INTRAVENOUS at 05:09

## 2021-01-01 RX ADMIN — DONEPEZIL HYDROCHLORIDE 20 MG: 5 TABLET, FILM COATED ORAL at 21:09

## 2021-01-01 RX ADMIN — IPRATROPIUM BROMIDE AND ALBUTEROL SULFATE 1 AMPULE: .5; 3 SOLUTION RESPIRATORY (INHALATION) at 16:32

## 2021-01-01 ASSESSMENT — ENCOUNTER SYMPTOMS
SHORTNESS OF BREATH: 1

## 2021-01-01 ASSESSMENT — PAIN SCALES - GENERAL
PAINLEVEL_OUTOF10: 0
PAINLEVEL_OUTOF10: 5
PAINLEVEL_OUTOF10: 0

## 2021-01-15 PROBLEM — J96.02 ACUTE HYPERCAPNIC RESPIRATORY FAILURE (HCC): Status: ACTIVE | Noted: 2021-01-01

## 2021-01-15 NOTE — LETTER
Patient Name: Gwyn Cantu  1934    Employees name: Galvan Rival  Relationship to patient: BSN, RN,     [facility]  Certification of [facility] Provider for  [facility] SPECIALTY [facility] Mother discharging home with Hospice under terminal diagnosis    Provider's name: Corky Agustin MD    [facility] [address]  [address] [address]  Dept: [address]: [phone]    Pt admitted 1/15/2021 and discharged 1/29/2021  Cam Fly  1/28/21
3. Describe other relevant medical facts, if any, related to the condition for which the patient needs care (such medical facts may include symptoms, diagnosis, or any regimen of continuing treatment such as the use of specialized equipment:   · Acute on chronic respiratory failure  · Bilateral pleural effusions  · Dysphagia  · Hx COPD  · Hx Dementia        Hx DM . PART B: AMOUNT OF LEAVE NEEDED: When answering these questions, keep in mind that your patient's need for care by the employee seeking leave may include assistance with basic medical hygiene, nutritional, safety or transportation needs, or the provision of physical or psychological care. 4. Will the patient be incapacitated for a single continuous period of time, including any time for treatment and recovery? Yes. If so, estimate the beginning and ending dates for the period of incapacity: 1/29/2021 to undetermined          During this time, will the patient need care? Yes. If so, explain the care needed by the patient and why such care is medically necessary: Total care, Hospice pt with terminal diagnosis . 5. Will the patient require follow-up treatments, including any time for recovery? Yes. Estimate treatment schedule, if any, including the dates of any scheduled appointments and the time required for each appointment, including any recovery period: Undetermined    Explain the care needed by the patient, and why such care is medically necessary:     6. Will the patient require care on an intermittent basis? Yes. If so, estimate the hours the patient needs care: 24 hours a day, 7 days a week. Explain the care needed by the patient, and why such care is medically necessary: Total care, Hospice with terminal diagnosis. 7. Will the condition cause episodic flare-ups periodically preventing the patient from participating in normal daily activities?  Yes.

## 2021-01-15 NOTE — ED NOTES
Bed: 11  Expected date:   Expected time:   Means of arrival:   Comments:  EMS     Tony Bass RN  01/15/21 1025

## 2021-01-15 NOTE — ED PROVIDER NOTES
Patient is a 59-year-old female with past medical history of COPD, diabetes, chronically on 4 L oxygen at home presenting to the emergency department for syncope and hypoxia. Symptoms severe in severity and constant since onset. History limited secondary to patient mental status and current condition. History obtained mostly from EMS and his daughter. Patient's daughter states that patient has been intermittently confused for the last couple weeks. She was diagnosed with a UTI on 12/29 and given antibiotic. She states she had a repeat urinalysis approximately 1 week ago that still demonstrated UTI so they switched up her antibiotics. States over the last 3 days patient has had a few syncopal events and has not been acting herself. She states on Wednesday her brother was with the patient and she became limp, clammy and discounted slumped over in her chair. This lasted a few minutes and completely resolved. She states this also happened again yesterday when patient was sitting on the toilet. She lost consciousness while sitting on the toilet And just slumped over. Today schedule appoint with patient's primary care provider to evaluate patient. When EMS arrived to the PCPs office patient was found to be 68% on room air. She was 99% when she was placed on a nonrebreather. Review of Systems   Unable to perform ROS: Mental status change        Physical Exam  Vitals signs and nursing note reviewed. Constitutional:       General: She is in acute distress. Appearance: She is well-developed. She is ill-appearing. HENT:      Head: Normocephalic and atraumatic. Eyes:      Extraocular Movements: Extraocular movements intact. Pupils: Pupils are equal, round, and reactive to light. Neck:      Musculoskeletal: Normal range of motion and neck supple. Cardiovascular:      Rate and Rhythm: Normal rate and regular rhythm. Pulmonary:      Effort: Respiratory distress present. Breath sounds: Normal breath sounds. No wheezing or rales. Abdominal:      Palpations: Abdomen is soft. Tenderness: There is no abdominal tenderness. There is no guarding or rebound. Musculoskeletal:         General: No swelling or tenderness. Skin:     General: Skin is warm and dry. Neurological:      Mental Status: She is alert and oriented to person, place, and time. Cranial Nerves: No cranial nerve deficit. Sensory: No sensory deficit. Motor: No weakness. Coordination: Coordination normal.      Comments: Patient knows her name but confused as to where she is or why she is here. She does not know the year. Moves all 4 extremities spontaneously. No facial droop. No ataxia or drift. 5 out of 5 muscle strength in the bilateral upper and lower extremities.           Procedures     MDM  Number of Diagnoses or Management Options     Amount and/or Complexity of Data Reviewed  Clinical lab tests: reviewed  Tests in the radiology section of CPT®: reviewed  Tests in the medicine section of CPT®: reviewed Patient presented to the emergency department for hypoxia and syncope. Initial exam she is in respiratory distress, satting 85% on her normal 5 L. ABG obtained and patient found to be hypercapnic. BiPAP placed and patient's clinical status as well as ABG greatly improved. Work-up initiated. She was not have a lactic acidosis of 5.1. Repeat lactic acidosis 1.4, most likely secondary to hypoxia. Chest x-ray essentially unremarkable and molecular panel negative. Symptoms most likely secondary to COPD exacerbation as well as acute hypercapnic respiratory failure. Cultures pending at this time. With patient having the multiple bouts of loss conscious over the past few days CT head was performed and essentially unremarkable, no acute intracranial abnormalities. Patient's clinical status should benefit from inpatient evaluation and care. Consult placed on-call internal medicine who accepted patient for admission. They requested a call to pulmonology and that consult was placed. Discussed case with pulmonology and they will follow. ED Course as of Leonidas 15 1709   Fri Leonidas 15, 2021   1201 Reevaluated patient, resting comfortably on bipap, o2 sat 100%. []   36 Spoke with Dr. Tyra Dinh, will admit. []      ED Course User Index  [KP] Katerina Sanchez, DO      ----------------------------------------------- PAST HISTORY --------------------------------------------  Past Medical History:  has a past medical history of Acid reflux, Arthritis, COPD (chronic obstructive pulmonary disease) ([facility] [address] Dementia ([facility] [address] Diabetes mellitus ([facility] [address] Hiatal hernia, Hyperlipidemia, and Oxygen dependent. Past Surgical History:  has a past surgical history that includes Cholecystectomy; Cataract removal with implant; Tubal ligation; Corneal transplant (01/11/2013); Breast surgery; Corneal transplant (Right, 03/16/2018); and pr corneal transplant,pen,pseudoaphak (Right, 3/16/2018). Social History:  reports that she has been smoking. She has a 44.25 pack-year smoking history. She has never used smokeless tobacco. She reports that she does not drink alcohol or use drugs. Family History: family history is not on file. The patients home medications have been reviewed. Allergies: Patient has no known allergies.     ------------------------------------------------ RESULTS ---------------------------------------------------    LABS:  Results for orders placed or performed during the hospital encounter of 01/15/21   Respiratory Panel, Molecular, with COVID-19 (Restricted: peds pts or suitable admitted adults)    Specimen: Nasopharyngeal   Result Value Ref Range    Adenovirus by PCR Not Detected Not Detected    Bordetella parapertussis by PCR Not Detected Not Detected    Bordetella pertussis by PCR Not Detected Not Detected    Chlamydophilia pneumoniae by PCR Not Detected Not Detected    Coronavirus 229E by PCR Not Detected Not Detected    Coronavirus HKU1 by PCR Not Detected Not Detected    Coronavirus NL63 by PCR Not Detected Not Detected    Coronavirus OC43 by PCR Not Detected Not Detected    SARS-CoV-2, PCR Not Detected Not Detected    Human Metapneumovirus by PCR Not Detected Not Detected    Human Rhinovirus/Enterovirus by PCR Not Detected Not Detected    Influenza A by PCR Not Detected Not Detected    Influenza B by PCR Not Detected Not Detected    Mycoplasma pneumoniae by PCR Not Detected Not Detected    Parainfluenza Virus 1 by PCR Not Detected Not Detected    Parainfluenza Virus 2 by PCR Not Detected Not Detected    Parainfluenza Virus 3 by PCR Not Detected Not Detected    Parainfluenza Virus 4 by PCR Not Detected Not Detected    Respiratory Syncytial Virus by PCR Not Detected Not Detected   Comprehensive Metabolic Panel   Result Value Ref Range    Sodium 138 132 - 146 mmol/L    Potassium 4.4 3.5 - 5.0 mmol/L    Chloride 90 (L) 98 - 107 mmol/L    CO2 36 (H) 22 - 29 mmol/L Lactic Acid, Sepsis 1.4 0.5 - 1.9 mmol/L   POCT Glucose   Result Value Ref Range    Glucose 209 mg/dL    QC OK? yes    POCT Glucose   Result Value Ref Range    Meter Glucose 209 (H) 74 - 99 mg/dL   EKG 12 Lead   Result Value Ref Range    Ventricular Rate 85 BPM    Atrial Rate 85 BPM    P-R Interval 148 ms    QRS Duration 80 ms    Q-T Interval 404 ms    QTc Calculation (Bazett) 480 ms    P Axis 60 degrees    R Axis 19 degrees    T Axis 26 degrees       RADIOLOGY:    All Radiology results interpreted by Radiologist unless otherwise noted. CT Head WO Contrast   Final Result   1. There is no acute intracranial abnormality. Specifically, there is no   intracranial hemorrhage. 2. Atrophy and periventricular leukomalacia,      XR CHEST PORTABLE   Final Result   No significant change          EKG: This EKG is signed and interpreted by ED Physician. Time:  1038   Rate: 85  Rhythm: Sinus. Interpretation: non-specific EKG. frequent PVCs. Normal axis deviation. QTc 480. Nonspecific T wave abnormality  Comparison: changes compared to previous EKG.    ---------------------------- NURSING NOTES AND VITALS REVIEWED -------------------------   The nursing notes within the ED encounter and vital signs as below have been reviewed.    BP (!) 103/47   Pulse 68   Temp 97 °F (36.1 °C) (Temporal)   Resp 22   Wt 97 lb (44 kg)   SpO2 100%   BMI 17.74 kg/m²   Oxygen Saturation Interpretation: Improved after treatment      ------------------------------------------PROGRESS NOTES -------------------------------------------    ED COURSE MEDICATIONS:                Medications   sodium chloride flush 0.9 % injection 10 mL (has no administration in time range)   sodium chloride flush 0.9 % injection 10 mL (10 mLs Intravenous Given 1/15/21 1645)   acetaminophen (TYLENOL) tablet 650 mg (has no administration in time range)   0.9 % sodium chloride bolus (0 mLs Intravenous Stopped 1/15/21 7126) ipratropium-albuterol (DUONEB) nebulizer solution 1 ampule (1 ampule Inhalation Given 1/15/21 1703)   methylPREDNISolone sodium (SOLU-MEDROL) injection 125 mg (125 mg Intravenous Given 1/15/21 1645)       CONSULTATIONS:            Consultation:  I Spoke with Dr. Anaid Vo (Medicine). Discussed case. They will admit this patient. Consultation:  I Spoke with Dr. Henna Estrada (pulmoology). Discussed case. They will provide consultation. Loki Pace PROCEDURES:            none. COUNSELING:   I have spoken with the patient and discussed todays results, in addition to providing specific details for the plan of care and counseling regarding the diagnosis and prognosis.     --------------------------------------- IMPRESSION & DISPOSITION --------------------------------     IMPRESSION(s):  1. Acute respiratory failure with hypercapnia (HCC)    2. COPD exacerbation ([facility] [address]    3. Lactic acidosis        This patient's ED course included: a personal history and physicial examination, re-evaluation prior to disposition, IV medications, cardiac monitoring and continuous pulse oximetry    This patient has been closely monitored during their ED course. DISPOSITION:  Disposition: Admit to telemetry. Patient condition is serious. END OF PROVIDER NOTE.           Marcus Sanchez DO  Resident  01/15/21 6435

## 2021-01-16 NOTE — H&P
HISTORY AND PHYSICAL             Date: 1/16/2021        Patient Name: Javan Ceja     YOB: 1934      Age:  80 y.o. Chief Complaint     Chief Complaint   Patient presents with    Loss of Consciousness     Pt was brought into her PCP's office today for several episodes of syncope for the past 3 days. When EMS arrived, the patient was 68% on room air. Currently 99% on a NRB. History Obtained From   patient, electronic medical record    History of Present Illness   Patient admitted for loss of consciousness and confusion. She was hypoxic. Past Medical History     Past Medical History:   Diagnosis Date    Acid reflux     Arthritis     COPD (chronic obstructive pulmonary disease) (HCC)     Dementia (HCC)     Diabetes mellitus ([facility] [address]     Hiatal hernia     Hyperlipidemia     Oxygen dependent         Past Surgical History     Past Surgical History:   Procedure Laterality Date    BREAST SURGERY      lumpectomy right and left \"cancer-free lumps\"    CATARACT REMOVAL WITH IMPLANT      left    CHOLECYSTECTOMY      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    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    Decoment stripping automated endothelial keratoplasty    MT CORNEAL TRANSPLANT,PEN,PSEUDOAPHAK Right 3/16/2018    RIGHT EYE DSAEK- CORNEAL TRANSPLANT  --STAFF FROM [facility]-- performed by Mervin eHrnandez MD at [address] [place] [address]          Medications Prior to Admission     Prior to Admission medications    Medication Sig Start Date End Date Taking?  Authorizing Provider   Donepezil HCl (ARICEPT) 23 MG TABS tablet Take 23 mg by mouth nightly   Yes Historical Provider, MD   doxycycline hyclate (VIBRA-TABS) 100 MG tablet Take 100 mg by mouth 2 times daily 1/11/21 1/18/21 Yes Historical Provider, MD   bumetanide (BUMEX) 1 MG tablet Take 1 mg by mouth daily   Yes Historical Provider, MD   meloxicam (MOBIC) 7.5 MG tablet Take 7.5 mg by mouth daily   Yes Historical Provider, MD fluticasone (FLONASE) 50 MCG/ACT nasal spray 1 spray by Nasal route daily   Yes Historical Provider, MD   Umeclidinium Bromide (INCRUSE ELLIPTA) 62.5 MCG/INH AEPB Inhale 1 puff into the lungs daily   Yes Historical Provider, MD   tiotropium (SPIRIVA) 18 MCG inhalation capsule Inhale 18 mcg into the lungs daily   Yes Historical Provider, MD   rosuvastatin (CRESTOR) 10 MG tablet Take 10 mg by mouth daily   Yes Historical Provider, MD   omeprazole (PRILOSEC) 20 MG delayed release capsule Take 20 mg by mouth daily   Yes Historical Provider, MD   Insulin Degludec (TRESIBA FLEXTOUCH) 100 UNIT/ML SOPN Inject 10 Units into the skin 2 times daily   Yes Historical Provider, MD   insulin aspart (NOVOLOG FLEXPEN) 100 UNIT/ML injection pen Inject 2 Units into the skin 3 times daily (before meals)   Yes Historical Provider, MD   prednisoLONE acetate (PRED FORTE) 1 % ophthalmic suspension Place 1 drop into both eyes daily    Yes Historical Provider, MD        Allergies   Patient has no known allergies. Social History     Social History     Tobacco History     Smoking Status  Current Every Day Smoker Smoking Frequency  0.75 packs/day for 59 years (44.25 pk yrs)    Smokeless Tobacco Use  Never Used          Alcohol History     Alcohol Use Status  No          Drug Use     Drug Use Status  No          Sexual Activity     Sexually Active  Not Asked                Family History   History reviewed. No pertinent family history. Review of Systems   Review of Systems   Constitutional: Positive for activity change. HENT: Negative for congestion. Respiratory: Positive for shortness of breath. Cardiovascular: Negative for chest pain. Musculoskeletal: Negative for arthralgias. Psychiatric/Behavioral: Positive for confusion.        Physical Exam   BP (!) 99/51   Pulse 68   Temp 97.5 °F (36.4 °C) (Temporal)   Resp 18   Ht 5' 2\" (1.575 m)   Wt 97 lb (44 kg)   SpO2 98%   BMI 17.74 kg/m²     Physical Exam  HENT: Mouth/Throat:      Mouth: Mucous membranes are moist.   Pulmonary:      Effort: Pulmonary effort is normal.      Breath sounds: Wheezing present. Skin:     General: Skin is warm and dry. Capillary Refill: Capillary refill takes less than 2 seconds. Neurological:      Mental Status: She is alert. Mental status is at baseline. Labs      Recent Results (from the past 24 hour(s))   POCT Glucose    Collection Time: 01/15/21 12:15 PM   Result Value Ref Range    Meter Glucose 209 (H) 74 - 99 mg/dL   POCT Glucose    Collection Time: 01/15/21 12:16 PM   Result Value Ref Range    Glucose 209 mg/dL    QC OK?  yes    Blood Gas, Arterial    Collection Time: 01/15/21  1:50 PM   Result Value Ref Range    Date Analyzed [id number]     Time Analyzed 1350     Source: Blood Arterial     pH, Blood Gas 7.303 (L) 7.350 - 7.450    PCO2 78.0 (HH) 35.0 - 45.0 mmHg    PO2 153.2 (H) 75.0 - 100.0 mmHg    HCO3 37.8 (H) 22.0 - 26.0 mmol/L    B.E. 8.6 (H) -3.0 - 3.0 mmol/L    O2 Sat 98.8 (H) 92.0 - 98.5 %    PO2/FIO2 2.55 mmHg/%    AaDO2 173.8 mmHg    RI(T) 113 %    O2Hb 97.4 (H) 94.0 - 97.0 %    COHb 1.0 0.0 - 1.5 %    MetHb 0.4 0.0 - 1.5 %    O2 Content 17.5 mL/dL    HHb 1.2 0.0 - 5.0 %    tHb (est) 12.6 11.5 - 16.5 g/dL    Mode AVAPS     FIO2 60.0 %    Rr Mechanical 18.0 b/min    Vt Mechanical 400.0 mL    Peep/Cpap 6.0 cmH2O    Date Of Collection      Time Collected      Pt Temp 37.0 C     ID [id number]     Lab [id number]     Critical(s) Notified Handed report to /RN    Urinalysis with Microscopic    Collection Time: 01/15/21  2:15 PM   Result Value Ref Range    Color, UA Yellow Straw/Yellow    Clarity, UA Clear Clear    Glucose, Ur Negative Negative mg/dL    Bilirubin Urine Negative Negative    Ketones, Urine Negative Negative mg/dL    Specific Gravity, UA 1.020 1.005 - 1.030    Blood, Urine TRACE-INTACT Negative    pH, UA 5.0 5.0 - 9.0    Protein, UA Negative Negative mg/dL    Urobilinogen, Urine 0.2 <2.0 E.U./dL Nitrite, Urine Negative Negative    Leukocyte Esterase, Urine Negative Negative    WBC, UA NONE 0 - 5 /HPF    RBC, UA 0-1 0 - 2 /HPF    Bacteria, UA NONE SEEN None Seen /HPF   Lactate, Sepsis    Collection Time: 01/15/21  2:53 PM   Result Value Ref Range    Lactic Acid, Sepsis 1.4 0.5 - 1.9 mmol/L   Lactate, Sepsis    Collection Time: 01/15/21  6:37 PM   Result Value Ref Range    Lactic Acid, Sepsis 1.7 0.5 - 1.9 mmol/L   POCT Glucose    Collection Time: 01/15/21  9:30 PM   Result Value Ref Range    Meter Glucose 87 74 - 99 mg/dL   POCT Glucose    Collection Time: 01/16/21 12:11 AM   Result Value Ref Range    Meter Glucose 85 74 - 99 mg/dL   POCT Glucose    Collection Time: 01/16/21  5:20 AM   Result Value Ref Range    Meter Glucose 85 74 - 99 mg/dL   CBC auto differential    Collection Time: 01/16/21  6:12 AM   Result Value Ref Range    WBC 4.7 4.5 - 11.5 E9/L    RBC 3.96 3.50 - 5.50 E12/L    Hemoglobin 12.2 11.5 - 15.5 g/dL    Hematocrit 40.6 34.0 - 48.0 %    .5 (H) 80.0 - 99.9 fL    MCH 30.8 26.0 - 35.0 pg    MCHC 30.0 (L) 32.0 - 34.5 %    RDW 13.2 11.5 - 15.0 fL    Platelets 895 623 - 586 E9/L    MPV 10.0 7.0 - 12.0 fL    Neutrophils % 80.5 (H) 43.0 - 80.0 %    Immature Granulocytes % 0.4 0.0 - 5.0 %    Lymphocytes % 16.6 (L) 20.0 - 42.0 %    Monocytes % 2.3 2.0 - 12.0 %    Eosinophils % 0.0 0.0 - 6.0 %    Basophils % 0.2 0.0 - 2.0 %    Neutrophils Absolute 3.78 1.80 - 7.30 E9/L    Immature Granulocytes # 0.02 E9/L    Lymphocytes Absolute 0.78 (L) 1.50 - 4.00 E9/L    Monocytes Absolute 0.11 0.10 - 0.95 E9/L    Eosinophils Absolute 0.00 (L) 0.05 - 0.50 E9/L    Basophils Absolute 0.01 0.00 - 0.20 J8/Y   Basic Metabolic Panel w/ Reflex to MG    Collection Time: 01/16/21  6:12 AM   Result Value Ref Range    Sodium 142 132 - 146 mmol/L    Potassium reflex Magnesium 5.4 (H) 3.5 - 5.0 mmol/L    Chloride 96 (L) 98 - 107 mmol/L    CO2 38 (H) 22 - 29 mmol/L    Anion Gap 8 7 - 16 mmol/L

## 2021-01-16 NOTE — PROGRESS NOTES
attempted to place pulm consult to Dr. Tom Salinas.  Dr Haja Zuniga covering, per perfect serve the office is not open to place consult at this time

## 2021-01-16 NOTE — PROGRESS NOTES
Dr. Ignacio Pae,    Your patient is on a medication that requires a renal dose adjustment. Renal Function Assessment:    Date Body Weight SCr CrCl Dialysis status   1/15/2021 44 kg 1 27 ml/min --       Pharmacy has renally dose-adjusted the following medication(s):    Date Medication Original Dosing Regimen New Dosing Regimen   1/15/2021 Enoxaparin  40 mg daily 30 mg daily           These changes were made per protocol according to the [facility] Renal Function-Based Dose Adjustments Policy    *Please note this dose may need readjusted if your patient's renal function significantly improves. Please contact pharmacy with any questions regarding these changes. Imelda Martinez, PharmD 1/15/2021 9:37 PM

## 2021-01-16 NOTE — PLAN OF CARE
Problem: Skin Integrity:  Goal: Absence of new skin breakdown  Description: Absence of new skin breakdown  1/16/2021 1751 by Rashi Dimas RN  Outcome: Met This Shift     Problem: Falls - Risk of:  Goal: Will remain free from falls  Description: Will remain free from falls  1/16/2021 1751 by Rashi Dimas RN  Outcome: Met This Shift     Problem: Falls - Risk of:  Goal: Absence of physical injury  Description: Absence of physical injury  1/16/2021 1751 by Rashi Dimas RN  Outcome: Met This Shift

## 2021-01-16 NOTE — PROGRESS NOTES
Date: 1/15/2021    Time: 9:44 PM    Patient Placed On BIPAP/CPAP/ Non-Invasive Ventilation? Yes    If no must comment. Facial area red/color change? No           If YES are Blister/Lesion present? No   If yes must notify nursing staff  BIPAP/CPAP skin barrier?   Yes    Skin barrier type:mepilexlite          01/15/21 8320   NIV Type   Skin Assessment Clean, dry, & intact   Skin Protection for O2 Device Yes   NIV Started/Stopped On   Equipment Type v60   Mode AVAPS   Mask Type Full face mask   Mask Size Small   Settings/Measurements   CPAP/EPAP 6 cmH2O   IPAP Min 22 cmH2O   IPAP Max 30 cmH2O   Vt Ordered 400 mL   Rate Ordered 18   Resp 19   Insp Rise Time (%) 2 %   FiO2  40 %   I Time/ I Time % 0.9 s   Vt Exhaled 560 mL   Minute Volume 8.4 Liters   Mask Leak (lpm) 29 lpm   Comfort Level Good           Celester Hailey

## 2021-01-16 NOTE — CONSULTS
Pulmonary Consultation    Admit Date: 1/15/2021  Requesting Physician: Alena Wild DO    CC: COPD/respiratory failure    HPI: This is a 80 y.o. female  86-lufr-ayln smoker, with dementia, diabetes, HLD, moderate COPD on 4L home oxygen  presenting to the emergency department for syncope and hypoxia. Symptoms severe in severity and constant since onset. History limited secondary to patient mental status and current condition. History obtained mostly from the chart. Patient's daughter states that patient has been intermittently confused for the last couple weeks. She was diagnosed with a UTI on 12/29 and given antibiotic. She states she had a repeat urinalysis approximately 1 week ago that still demonstrated UTI so they switched up her antibiotics. States over the last 3 days patient has had a few syncopal events and has not been acting herself. She states on Wednesday her brother was with the patient and she became limp, clammy and discounted slumped over in her chair. This lasted a few minutes and completely resolved. She states this also happened again yesterday when patient was sitting on the toilet. She lost consciousness while sitting on the toilet And just slumped over. Today schedule appoint with patient's primary care provider to evaluate patient. When EMS arrived to the PCPs office patient was found to be 68% on room air. She was 99% when she was placed on a nonrebreather.     PMH:    Past Medical History:   Diagnosis Date    Acid reflux     Arthritis     COPD (chronic obstructive pulmonary disease) (HCC)     Dementia (HCC)     Diabetes mellitus ([address]     Hiatal hernia     Hyperlipidemia     Oxygen dependent      PSH:   Past Surgical History:   Procedure Laterality Date    BREAST SURGERY      lumpectomy right and left \"cancer-free lumps\"    CATARACT REMOVAL WITH IMPLANT      left    CHOLECYSTECTOMY      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  CORNEAL TRANSPLANT Right 2018    Decoment stripping automated endothelial keratoplasty    MA CORNEAL TRANSPLANT,PEN,PSEUDOAPHAK Right 3/16/2018    RIGHT EYE DSAEK- CORNEAL TRANSPLANT  --STAFF FROM Replaced by [facility]-- performed by Mayo Caputo [facility] at  [facility]            Respiratory ROS: unable due to confusion    Social History:  · Alcohol:  unkown  · Tobacco: 44 p years  · Employment: unknown  Medications:     dextrose        bumetanide  1 mg Oral Daily    donepezil  20 mg Oral Nightly    fluticasone  1 spray Nasal Daily    prednisoLONE acetate  1 drop Both Eyes Daily    rosuvastatin  10 mg Oral Daily    sodium chloride flush  10 mL Intravenous 2 times per day    enoxaparin  30 mg Subcutaneous Daily    ipratropium-albuterol  1 ampule Inhalation Q4H WA    methylPREDNISolone  40 mg Intravenous Q8H    insulin glargine  5 Units Subcutaneous Nightly    insulin lispro  0-6 Units Subcutaneous TID WC    insulin lispro  0-3 Units Subcutaneous Nightly    pantoprazole  40 mg Oral QAM AC         Vitals:  Tmax:  VITALS:  BP (!) 99/51   Pulse 68   Temp 97.5 °F (36.4 °C) (Temporal)   Resp 18   Ht 5' 2\" (1.575 m)   Wt 97 lb (44 kg)   SpO2 98%   BMI 17.74 kg/m²   24HR INTAKE/OUTPUT:      Intake/Output Summary (Last 24 hours) at 2021 1033  Last data filed at 1/15/2021 1416  Gross per 24 hour   Intake    Output 350 ml   Net -350 ml     CURRENT PULSE OXIMETRY:  SpO2: 98 %  24HR PULSE OXIMETRY RANGE:  SpO2  Av.9 %  Min: 92 %  Max: 100 %        EXAM:  General: No distress. Sleepy but arouses. Confused  Eyes: PERRL. No sclera icterus. No conjunctival injection. ENT: No discharge. Pharynx clear. Neck: Trachea midline. Normal thyroid. Resp: No accessory muscle use. Tim crackles L>R. No wheezing. No rhonchi. CV: Regular rate. Regular rhythm. + DONNA no rub. ABD: Non-tender. Non-distended. No masses. No organmegaly. Normal bowel sounds. Skin: Warm and dry. No nodule on exposed extremities. No rash on exposed extremities. Lymph: No cervical LAD. No supraclavicular LAD. Ext: No joint deformity. No clubbing. No cyanosis. No edema  Neuro: Sleepy, but arouses with name. Confused. Lab Results:  CBC:   Recent Labs     01/15/21  1055 01/16/21  0612   WBC 9.9 4.7   HGB 12.5 12.2   HCT 41.8 40.6   .0* 102.5*    187     BMP:   Recent Labs     01/15/21  1055 01/16/21  0612    142   K 4.4 5.4*   CL 90* 96*   CO2 36* 38*   BUN 28* 29*   CREATININE 1.0 0.8       PT/INR:   Recent Labs     01/15/21  1055   PROTIME 13.4*   INR 1.2         ABG: noted  Films:  01/15/2021 CXR :FINDINGS: Significant bilateral parenchymal and interstitial fibrosis is unchanged. Small bilateral pleural effusions are suspected. Heart size is normal.       01/15/2021 CT HEAD: Impression 1. There is no acute intracranial abnormality. Specifically, there is no intracranial hemorrhage. 2. Atrophy and periventricular leukomalacia,       Assessment:  1. Acute on chronic hypoxemic respiratory failure baseline 4 L  2. Small Pleural effusions B  3. 10/2019 Echo showing EF of 68% mod MR mod pulmonary hypertension EF normal diastolic dysfunction grade 1  4. Dysphasia patient had a swallow study done 10/16/2019 showing that the patient aspirated with nectar consistency   5. Moderate obstructive lung disease last FEV1 0.9 L 65% predicted no improvement with bronchodilators on 4/17/2019  6. Dementia with CT showing white small vessel ischemic disease  7. History of calcified lung nodules and mediastinal lymphadenopathy reflective of granulomatous disease COPD  8. DM  9. Dementia  10. Hypothyroid   11.  Hyperkalemia        Plan:  · Wean O2 to keep pox >90-95%  · NIV if needed - AVAPS  · Continue Solumedrol IV  · Bumex daily  · Check proBNP  · Procal 0.27  · Add Rocephin, Doxy IV  · Viral panel negative, COVID negative  · Check sputum   · Continue duonebs Thanks for letting us see this patient in consultation. Please contact us with any questions. MEGHANN Dodd    Seen and evaluated, agree with above assessment and plan  Films seen  We will get LDH and CRP.   If elevated will proceed to CT scan of the chest without contrast and repeat COVID-19 test

## 2021-01-16 NOTE — H&P
History and Physical      CHIEF COMPLAINT:  AMS      HISTORY OF PRESENT ILLNESS:      The patient is a 80 y.o. female patient of Dr. Cory Schmitz  presents with a change in mental status including confusion and syncopal episodes with LOC. The patient initially treated for a UTI with antibiotics only to change antibiotics when condition failed to improve. She has a known history of COPD on home O2 and  IDDM2. ER findings note a nl CXR with ABG's c/w acute hypoxemic and hypercarbic respiratory failure improved with BiPAP.     Past Medical History:    Past Medical History:   Diagnosis Date    Acid reflux     Arthritis     COPD (chronic obstructive pulmonary disease) (HCC)     Dementia (HCC)     Diabetes mellitus ([address]     Hiatal hernia     Hyperlipidemia     Oxygen dependent        Past Surgical History:    Past Surgical History:   Procedure Laterality Date    BREAST SURGERY      lumpectomy right and left \"cancer-free lumps\"    CATARACT REMOVAL WITH IMPLANT      left    CHOLECYSTECTOMY      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    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    Decoment stripping automated endothelial keratoplasty    AL CORNEAL TRANSPLANT,PEN,PSEUDOAPHAK Right 3/16/2018    RIGHT EYE DSAEK- CORNEAL TRANSPLANT  --STAFF FROM [facility]-- performed by Susana Haines MD at [facility]         Medications Prior to Admission:    Medications Prior to Admission: Donepezil HCl (ARICEPT) 23 MG TABS tablet, Take 23 mg by mouth nightly  doxycycline hyclate (VIBRA-TABS) 100 MG tablet, Take 100 mg by mouth 2 times daily  bumetanide (BUMEX) 1 MG tablet, Take 1 mg by mouth daily  meloxicam (MOBIC) 7.5 MG tablet, Take 7.5 mg by mouth daily  fluticasone (FLONASE) 50 MCG/ACT nasal spray, 1 spray by Nasal route daily  Umeclidinium Bromide (INCRUSE ELLIPTA) 62.5 MCG/INH AEPB, Inhale 1 puff into the lungs daily  tiotropium (SPIRIVA) 18 MCG inhalation capsule, Inhale 18 mcg into the lungs daily rosuvastatin (CRESTOR) 10 MG tablet, Take 10 mg by mouth daily  omeprazole (PRILOSEC) 20 MG delayed release capsule, Take 20 mg by mouth daily  Insulin Degludec (TRESIBA FLEXTOUCH) 100 UNIT/ML SOPN, Inject 10 Units into the skin 2 times daily  insulin aspart (NOVOLOG FLEXPEN) 100 UNIT/ML injection pen, Inject 2 Units into the skin 3 times daily (before meals)  prednisoLONE acetate (PRED FORTE) 1 % ophthalmic suspension, Place 1 drop into both eyes daily     Allergies:    Patient has no known allergies. Social History:    reports that she has been smoking. She has a 44.25 pack-year smoking history. She has never used smokeless tobacco. She reports that she does not drink alcohol or use drugs. Family History:   family history is not on file. REVIEW OF SYSTEMS:  As above in the HPI, otherwise negative    PHYSICAL EXAM:    Vitals:  BP (!) 119/56   Pulse 77   Temp 96.9 °F (36.1 °C) (Temporal)   Resp 18   Wt 97 lb (44 kg)   SpO2 96%   BMI 17.74 kg/m²     General:   Awake, alert, oriented X 3. No acute distress. HEENT:    Normocephalic, atraumatic. Pupils equal, round, reactive to light. No scleral icterus. No conjunctival injection. Oropharynx clear. No nasal discharge. Neck:       Supple. No bruits, adenopathy, masses, neck vein distention. Trachea in the midline. Heart:      RRR, no murmurs, gallops, rubs  Lungs:     CTA bilaterally, bilat symmetrical expansion, no wheeze, rales, or rhonchi  Abdomen:   Bowel sounds present, soft, nontender, no masses, no organomegaly, no peritoneal signs  Extremities:  No clubbing, cyanosis, or edema  Skin:         Warm and dry, no open lesions or rash  Neuro:     Cranial nerves 2-12 intact, no focal deficits  Breast:    deferred  Rectal:    deferred  Genitalia:  deferred    LABS:    CBC with Differential:    Lab Results   Component Value Date    WBC 9.9 01/15/2021    RBC 3.98 01/15/2021    HGB 12.5 01/15/2021    HCT 41.8 01/15/2021     01/15/2021 .0 01/15/2021    MCH 31.4 01/15/2021    MCHC 29.9 01/15/2021    RDW 13.5 01/15/2021    LYMPHOPCT 8.6 01/15/2021    MONOPCT 7.1 01/15/2021    BASOPCT 0.2 01/15/2021    MONOSABS 0.70 01/15/2021    LYMPHSABS 0.85 01/15/2021    EOSABS 0.13 01/15/2021    BASOSABS 0.02 01/15/2021     CMP:    Lab Results   Component Value Date     01/15/2021    K 4.4 01/15/2021    CL 90 01/15/2021    CO2 36 01/15/2021    BUN 28 01/15/2021    CREATININE 1.0 01/15/2021    GFRAA >60 01/15/2021    LABGLOM 53 01/15/2021    GLUCOSE 209 01/15/2021    PROT 6.2 01/15/2021    LABALBU 3.6 01/15/2021    CALCIUM 8.9 01/15/2021    BILITOT 0.7 01/15/2021    ALKPHOS 136 01/15/2021    AST 70 01/15/2021    ALT 25 01/15/2021     U/A:    Lab Results   Component Value Date    COLORU Yellow 01/15/2021    PROTEINU Negative 01/15/2021    PHUR 5.0 01/15/2021    WBCUA NONE 01/15/2021    RBCUA 0-1 01/15/2021    BACTERIA NONE SEEN 01/15/2021    CLARITYU Clear 01/15/2021    SPECGRAV 1.020 01/15/2021    LEUKOCYTESUR Negative 01/15/2021    UROBILINOGEN 0.2 01/15/2021    BILIRUBINUR Negative 01/15/2021    BLOODU TRACE-INTACT 01/15/2021    GLUCOSEU Negative 01/15/2021     ABG:    Lab Results   Component Value Date    PH 7.303 01/15/2021    PCO2 78.0 01/15/2021    PO2 153.2 01/15/2021    HCO3 37.8 01/15/2021    BE 8.6 01/15/2021    O2SAT 98.8 01/15/2021       ASSESSMENT:      Active Hospital Problems    Diagnosis Date Noted    Acute hypercapnic respiratory failure ([address] [J96.02] 01/15/2021        PLAN:  Admit to a monitored floor               Respiratory support with O2, BiPAP, bronchodilators              Pulmonary consult              Diabetes management              Re-assess for UTI           Rodolfo Torres DO  7:55 PM  1/15/2021

## 2021-01-16 NOTE — PROGRESS NOTES
Daughter Oksana Nuñez made aware that [address] [facility], [address] took over for Irene. Oksana Nuñez wishes that Irene would be the one to be primary on her mothers case.    Call out to Irene to notify of patient family request.

## 2021-01-17 NOTE — PROGRESS NOTES
01/15/21  1055   APTT 23.8*     LIVER PROFILE:  Recent Labs     01/15/21  1055   AST 70*   ALT 25   BILITOT 0.7   ALKPHOS 136*       Imaging Last 24 Hours:  Ct Head Wo Contrast    Result Date: 1/15/2021  EXAMINATION: CT OF THE HEAD WITHOUT CONTRAST  1/15/2021 12:26 pm TECHNIQUE: CT of the head was performed without the administration of intravenous contrast. Dose modulation, iterative reconstruction, and/or weight based adjustment of the mA/kV was utilized to reduce the radiation dose to as low as reasonably achievable. COMPARISON: 09/11/2018 HISTORY: ORDERING SYSTEM PROVIDED HISTORY: AMS TECHNOLOGIST PROVIDED HISTORY: Reason for exam:->AMS Has a \"code stroke\" or \"stroke alert\" been called? ->No What reading provider will be dictating this exam?->CRC FINDINGS: BRAIN/VENTRICLES: There is no acute intracranial hemorrhage, mass effect or midline shift. No abnormal extra-axial fluid collection. The gray-white differentiation is maintained without evidence of an acute infarct. There is no evidence of hydrocephalus. The ventricles, cisterns and sulci are prominent consistent with atrophy. There is decreased attenuation within the periventricular white matter consistent with periventricular leukomalacia. ORBITS: The visualized portion of the orbits demonstrate no acute abnormality. SINUSES: The visualized paranasal sinuses and mastoid air cells demonstrate no acute abnormality. SOFT TISSUES/SKULL:  No acute abnormality of the visualized skull or soft tissues. 1. There is no acute intracranial abnormality. Specifically, there is no intracranial hemorrhage.  2. Atrophy and periventricular leukomalacia,    Xr Chest Portable    Result Date: 1/15/2021 EXAMINATION: ONE XRAY VIEW OF THE CHEST 1/15/2021 10:57 am COMPARISON: 11/08/2019 HISTORY: ORDERING SYSTEM PROVIDED HISTORY: hypoxia TECHNOLOGIST PROVIDED HISTORY: Reason for exam:->hypoxia What reading provider will be dictating this exam?->CRC FINDINGS: Significant bilateral parenchymal and interstitial fibrosis is unchanged. Small bilateral pleural effusions are suspected. Heart size is normal. There is a moderate amount of aorto iliac arterial vascular plaque. No significant change    Assessment//Plan           Hospital Problems           Last Modified POA    Acute hypercapnic respiratory failure ([address] 1/15/2021 Yes        Assessment:  (Acute hypoxic respiratory failure  ).    pleural effusion  DM  Dementia    Pulmonary following  Steroids, nebs  Continue rest of meds    Electronically signed by Ethan Torres MD on 1/17/21 at 8:37 AM EST

## 2021-01-17 NOTE — PROGRESS NOTES
Date: 1/16/2021    Time: 7:44 PM    Patient Placed On BIPAP/CPAP/ Non-Invasive Ventilation? No    If no must comment. Facial area red/color change? No           If YES are Blister/Lesion present? No   If yes must notify nursing staff  BIPAP/CPAP skin barrier?   Yes    Skin barrier type:mepilexlite       Comments:    Pt refuses to go on bipap at this time    [id number]

## 2021-01-17 NOTE — PLAN OF CARE
Problem: Skin Integrity:  Goal: Will show no infection signs and symptoms  Description: Will show no infection signs and symptoms  1/17/2021 1618 by Latoya Cortez RN  Outcome: Met This Shift     Problem: Skin Integrity:  Goal: Absence of new skin breakdown  Description: Absence of new skin breakdown  1/17/2021 1618 by Latoya Cortez RN  Outcome: Met This Shift     Problem: Falls - Risk of:  Goal: Will remain free from falls  Description: Will remain free from falls  1/17/2021 1618 by Latoya Cortez RN  Outcome: Met This Shift     Problem: Falls - Risk of:  Goal: Absence of physical injury  Description: Absence of physical injury  1/17/2021 1618 by Latoya Cortez RN  Outcome: Met This Shift

## 2021-01-17 NOTE — PLAN OF CARE
Problem: Skin Integrity:  Goal: Will show no infection signs and symptoms  Description: Will show no infection signs and symptoms  Outcome: Met This Shift     Problem: Skin Integrity:  Goal: Absence of new skin breakdown  Description: Absence of new skin breakdown  1/17/2021 0418 by Ernesto Jacob RN  Outcome: Met This Shift     Problem: Falls - Risk of:  Goal: Will remain free from falls  Description: Will remain free from falls  1/17/2021 0418 by Ernesto Jacob RN  Outcome: Met This Shift     Problem: Falls - Risk of:  Goal: Absence of physical injury  Description: Absence of physical injury  1/17/2021 0418 by Ernesto Jacob RN  Outcome: Met This Shift

## 2021-01-17 NOTE — PROGRESS NOTES
[facility]  Pulmonary Progress Note        Admit Date: 1/15/2021  Requesting Physician: Malinda Campbell MD     Subjective:   Much more alert and interactive this am  Doesn't feel well but can't describe, fatigued  Denies dyspnea but is dyspneic during conversation  Dry cough  No CP  On 10L HFNC    PMH:    Past Medical History:   Diagnosis Date    Acid reflux     Arthritis     [facility]     Dementia [facility]     Diabetes mellitus ([facility]     Hiatal hernia     Hyperlipidemia     Oxygen dependent      PSH:   Past Surgical History:   Procedure Laterality Date    BREAST SURGERY      lumpectomy right and left \"cancer-free lumps\"    CATARACT REMOVAL WITH IMPLANT      left    CHOLECYSTECTOMY      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    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    Decoment stripping automated endothelial keratoplasty    AL CORNEAL TRANSPLANT,PEN,PSEUDOAPHAK Right 3/16/2018    RIGHT EYE DSAEK- CORNEAL TRANSPLANT  --STAFF FROM [facility]-- performed by Rui Sánchez MD at [address] [place] [address]            Respiratory ROS: fatigue    Social History:  · Alcohol:  unkown  · Tobacco: 44 p years  · Employment: unknown  Medications:     dextrose        cefTRIAXone (ROCEPHIN) IV  1 g Intravenous Q24H    doxycycline (VIBRAMYCIN) IV  100 mg Intravenous Q12H    insulin glargine  3 Units Subcutaneous Nightly    donepezil  20 mg Oral Nightly    fluticasone  1 spray Nasal Daily    prednisoLONE acetate  1 drop Both Eyes Daily    rosuvastatin  10 mg Oral Daily    sodium chloride flush  10 mL Intravenous 2 times per day    enoxaparin  30 mg Subcutaneous Daily    ipratropium-albuterol  1 ampule Inhalation Q4H WA    methylPREDNISolone  40 mg Intravenous Q8H    pantoprazole  40 mg Oral QAM AC         Vitals:  Tmax: VITALS:  BP (!) 103/51   Pulse 63   Temp 98.1 °F (36.7 °C) (Temporal)   Resp 14   Ht 5' 2\" (1.575 m)   Wt 96 lb 6.4 oz (43.7 kg)   SpO2 93%   BMI 17.63 kg/m²   24HR INTAKE/OUTPUT:      Intake/Output Summary (Last 24 hours) at 2021 0928  Last data filed at 2021 0615  Gross per 24 hour   Intake 0 ml   Output 0 ml   Net 0 ml     CURRENT PULSE OXIMETRY:  SpO2: 93 %  24HR PULSE OXIMETRY RANGE:  SpO2  Av.3 %  Min: 93 %  Max: 100 %        EXAM:  General: No distress. Much more alert today. Eyes: PERRL. No sclera icterus. No conjunctival injection. ENT: No discharge. Pharynx clear. Neck: Trachea midline. Normal thyroid. Resp: No accessory muscle use. Diminished with darlene crackles L>R. No wheezing. No rhonchi. CV: Regular rate. Regular rhythm. + DONNA no rub. ABD: Non-tender. Non-distended. No masses. No organmegaly. Normal bowel sounds. Skin: Warm and dry. No nodule on exposed extremities. No rash on exposed extremities. Lymph: No cervical LAD. No supraclavicular LAD. Ext: No joint deformity. No clubbing. No cyanosis. No edema  Neuro: Much more alert this am, confused     Lab Results:  CBC:   Recent Labs     01/15/21  1055 21  0612   WBC 9.9 4.7   HGB 12.5 12.2   HCT 41.8 40.6   .0* 102.5*    187     BMP:   Recent Labs     01/15/21  1055 21  0612    142   K 4.4 5.4*   CL 90* 96*   CO2 36* 38*   BUN 28* 29*   CREATININE 1.0 0.8       PT/INR:   Recent Labs     01/15/21  1055   PROTIME 13.4*   INR 1.2         ABG: noted  Films:  01/15/2021 CXR :FINDINGS: Significant bilateral parenchymal and interstitial fibrosis is unchanged. Small bilateral pleural effusions are suspected. Heart size is normal.       01/15/2021 CT HEAD: Impression 1. There is no acute intracranial abnormality. Specifically, there is no intracranial hemorrhage. 2. Atrophy and periventricular leukomalacia,       Assessment:  1.  Acute on chronic hypoxemic respiratory failure baseline 4 L 2. Small Pleural effusions B  3. 10/2019 Echo showing EF of 68% mod MR mod pulmonary hypertension EF normal diastolic dysfunction grade 1  4. Dysphasia patient had a swallow study done 10/16/2019 showing that the patient aspirated with nectar consistency   5. Moderate obstructive lung disease last FEV1 0.9 L 65% predicted no improvement with bronchodilators on 4/17/2019  6. Dementia with CT showing white small vessel ischemic disease  7. History of calcified lung nodules and mediastinal lymphadenopathy reflective of granulomatous disease COPD  8. DM  9. Dementia  10. Hypothyroid   11.  Hyperkalemia        Plan:  · On 10 L HFNC with pox 93%, wean O2 to keep pox >90-95%  · NIV if needed - AVAPS  · Decrease Solumedrol to BID  · CRP 0.3,   · Bumex stopped due to relative hypotension  · ProBNP 3003  · Procal 0.27 - on Rocephin, Doxy IV  · Afebrile and nl WBC  · Viral panel negative, COVID negative  · Continue duonebs  · Barium swallow ordered - hx of aspiration       Melissa Listen, APRN-CNP

## 2021-01-18 NOTE — PROGRESS NOTES
Occupational Therapy  OCCUPATIONAL THERAPY INITIAL EVALUATION      Date:2021  Patient Name: Baldev Weller  MRN: [id number]  : 1934  Room: [address] [place] [address]    Evaluating OT: Angelia Stevens OTR/CHRIS #[id number]    Referring physician: Bboo León MD      AM-PAC Daily Activity Raw Score: 10/24  Recommended Adaptive Equipment: TBD     Reason for Admission/Diagnosis: Acute hypercapnic respiratory failure  Pertinent Medical History/Surgery:acid reflux, arthritis, COPD, dementia, HLD, O2 dependent, B lumpectomy      Precautions:  Falls, tele/continuous pulseox, alarm/impaired cognition, HFNC supplemental O2, modified diet     Home Living: Pt lives with daughter in a one story house with 2 steps to enter with B railings. Bedroom and bathroom are located on the first floor. Bathroom setup: Tub/shower with shower chair, elevated toilet seat   Equipment owned: shower chair  Prior Level of Function:  Patient is a questionable historian. Patient reports assist with ADLs. Patient's daughter assists with dressing and bathing,  assist with IADLs. Patient reports daughter works limiting availability to assist.  Patient was using no device for functional mobility.    Driving: no                                 Pain Level: No pain reported  Cognition: A&O: self:yes, place:yes, time: no, situation:no  Follows 1 step directions with increased processing time   Memory:  fair    Sequencing:  fair    Problem solving:  poor   Judgement/safety:  poor     Functional Assessment:   Initial Eval Status  Date: 21 Treatment Status  Date: Short Term Goals=LTG  Treatment frequency: PRN 1-3 x/week   Feeding Minimal Assist   Completed supine with HOB elevated  Modified Hurricane    Grooming Moderate Assist  Simulated grooming tasks   Modified Hurricane     UB Dressing Moderate Assist   Modified Hurricane    LB Dressing Dependent   Total A to don socks   Moderate Assist    Bathing Dependent   Moderate Assist Toileting Dependent   Moderate Assist    Bed Mobility  Supine to sit: Max A   Sit to supine: Max A  Verbal/tactile cues for sequencing and technique  Supine to sit: Min A   Sit to supine: Min A   Functional Transfers Sit to stand: Mod A  Stand to sit: Mod A  Verbal cues for safety, without device  SBA using fww   Functional Mobility Mod A  Completed ~2 side steps without device  SBA using fww  Bed<>bathroom   Balance Sitting:     Static:SBA, kyphotic posture observed    Dynamic:NT  Standing: Mod A     Activity Tolerance Fair-  Fair+   Visual/  Perceptual Glasses: yes         Safety       Fair                 Fair+     Upper Extremities:    Treatment Status  Date: Short Term Goals=LTG   B UE UE ROM:   B shoulder active ROM: ~0-45 degrees flexion/extension  B elbow-hand active ROM: WFL       Strength: B: 3+/5                     Coordination Fine/gross Motor Coordination:  Impaired  Opposition:Impaired, increased time and effort required                              Hand dominance: R handed    Hearing: WFL  Sensation:  No c/o numbness or tingling  Tone:  WFL  Edema: none observed B UE                            Comments: Nursing approved OT session. Upon arrival, patient semi-supine in bed and agreeable to session. OT evaluation performed with education provided regarding the purpose and benefits of OT session, along with mobility and I/ADL completion. Patient demonstrates functional limitations with ADLs/ functional mobility due to weakness, decreased activity tolerance, and impaired cognition. At end of session, patient semi-supine in bed with call light and phone within reach, along with all lines and tubes intact. Bed alarm on. Nursing notified.      Treatment: OT treatment provided this date includes: Patient / Family Goal: Patient did not verbalize any goals     Evaluation Time includes thorough review of current medical information, gathering information on past medical history/social history and prior level of function, completion of standardized testing/informal observation of tasks, assessment of data and education on plan of care and goals. Patient and/or family were instructed on functional diagnosis, prognosis/goals and OT plan of care. Demonstrated fair understanding. Time In:  11:28  Time Out: 11:53  Total Session Time: 25 minutes  Total Treatment Time: 10 minutes    Min Units   OT Eval Low 04232       OT Eval Medium 97166  X 1   OT Eval High 26158       OT Re-Eval [id number]       Therapeutic Ex 79507       Therapeutic Activities 42055       ADL/Self Care 92693  10  1   Orthotic Management 68630       Neuro Re-Ed 96767       Non-Billable Time           [] Malnutrition indicators have been identified and nursing has been notified to ensure a dietitian consult is ordered.       Mod OT Evaluation + 10  treatment minutes    JESUS Moctezuma/CHRIS #[id number]

## 2021-01-18 NOTE — PROGRESS NOTES
Date: 1/17/2021    Time: 10:40 PM    Patient Placed On BIPAP/CPAP/ Non-Invasive Ventilation? Yes    If no must comment. Facial area red/color change? No           If YES are Blister/Lesion present? No   If yes must notify nursing staff  BIPAP/CPAP skin barrier?   Yes    Skin barrier type:mepilexlite       Comments:18/400/40/6          Daphne Bryson

## 2021-01-18 NOTE — PROGRESS NOTES
SPEECH/LANGUAGE PATHOLOGY  VIDEOFLUOROSCOPIC STUDY OF SWALLOWING (MBS)      PATIENT NAME:  Alejandra Cruz      :  1934          TODAY'S DATE:  2021 ROOM:  [address] [place] [address]      SUMMARY OF EVALUATION     DYSPHAGIA DIAGNOSIS:  Moderate oropharyngeal dysphagia      DIET RECOMMENDATIONS:  Dysphagia 3, Soft/advanced (Soft & Bite-sized) solids with  honey consistency (moderately thick) liquids     FEEDING RECOMMENDATIONS:     Assistance level:  Stand by assistance is needed during all oral intake      Compensatory strategies recommended: No strategies are recommended at this time    THERAPY RECOMMENDATIONS:      Dysphagia therapy is recommended 3-5 times per week for LOS or when goals are met with emphasis on the following:  Pt will complete oral motor strength/ coordination exercises to improve bolus prep/ control and mastication with moderate cuing. Pt will complete laryngeal strength/ ROM therapeutic exercises to improve airway protection for the least restrictive PO diet with with moderate cuing.                   PROCEDURE     Consistencies Administered During the Evaluation   Liquids: nectar thick liquid and honey thick liquid   Solids:  pureed foods and solid foods      Method of Intake:   cup, spoon  Fed by clinician      Position:   Seated, upright, Lateral plane    Current Respiratory Status   nasal canula                RESULTS     ORAL STAGE       Decreased mastication due to:  disorganized chewing pattern        PHARYNGEAL STAGE           ONSET TIME     Onset time of the pharyngeal swallow was adequate       PHARYNGEAL RESIDUALS          Vallecula/Pharyngeal Wall           No significant residuals were noted in the vallecula      Pyriform Sinuses      No significant residuals were noted in the pyriform sinuses    LARYNGEAL PENETRATION Laryngeal penetration occurred in the absence of aspiration DURING the swallow for nectar consistency liquid due to  inadequate laryngeal closure which remained in the laryngeal vestibule. . Laryngeal penetration was moderate and occurred consistently   an absent cough/throat clear was noted                 ASPIRATION    Aspiration  was not present during this evaluation however there is high risk for aspiration  of thin liquid and nectar consistency liquid due to laryngeal penetration         COMPENSATORY STRATEGIES       Compensatory strategies were not attempted      STRUCTURAL/FUNCTIONAL ANOMALIES       No structural/functional anomalies were noted      CERVICAL ESOPHAGEAL STAGE :        The cervical esophagus appeared adequate                            Prognosis for improvements is fair +  This plan will be re-evaluated and revised in 1 week  if warranted. Patient stated goals: Agreed with above  Treatment goals discussed with Patient   The Patient understand(s) the diagnosis, prognosis and plan of care       CPT code:  48671  dysphagia study      [x]The admitting diagnosis and active problem list, as listed below have been reviewed prior to initiation of this evaluation.      ADMITTING DIAGNOSIS: Acute hypercapnic respiratory failure (HCC) [J96.02]  Acute hypercapnic respiratory failure ([address] [J96.02]     ACTIVE PROBLEM LIST:   Patient Active Problem List   Diagnosis    Cornea guttata    Pneumonia    Hospital-acquired pneumonia    Acute respiratory failure with hypoxia ([address]    COPD exacerbation ([address]    Severe protein-calorie malnutrition ([address]    Acute hypercapnic respiratory failure ([address]

## 2021-01-18 NOTE — PROGRESS NOTES
[facility]  Pulmonary Progress Note        Admit Date: 1/15/2021  Requesting Physician: Romain Monroy MD     Subjective:   Much more alert and interactive this am  Doesn't feel well but can't describe, fatigued  Denies dyspnea but is dyspneic during conversation  Dry cough  No CP  On 15L HFNC    PMH:    Past Medical History:   Diagnosis Date    Acid reflux     Arthritis     [facility]     Dementia [facility]     Diabetes mellitus ([facility]     Hiatal hernia     Hyperlipidemia     Oxygen dependent      PSH:   Past Surgical History:   Procedure Laterality Date    BREAST SURGERY      lumpectomy right and left \"cancer-free lumps\"    CATARACT REMOVAL WITH IMPLANT      left    CHOLECYSTECTOMY      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    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    Decoment stripping automated endothelial keratoplasty    GA CORNEAL TRANSPLANT,PEN,PSEUDOAPHAK Right 3/16/2018    RIGHT EYE DSAEK- CORNEAL TRANSPLANT  --STAFF FROM [facility]-- performed by Jaden Herrera MD at [facility]            Respiratory ROS: fatigue    Social History:  · Alcohol:  unkown  · Tobacco: 44 p years  · Employment: unknown  Medications:     dextrose        methylPREDNISolone  40 mg Intravenous Q12H    insulin glargine  10 Units Subcutaneous Nightly    insulin lispro  0-12 Units Subcutaneous TID WC    insulin lispro  0-6 Units Subcutaneous Nightly    cefTRIAXone (ROCEPHIN) IV  1 g Intravenous Q24H    doxycycline (VIBRAMYCIN) IV  100 mg Intravenous Q12H    donepezil  20 mg Oral Nightly    fluticasone  1 spray Nasal Daily    prednisoLONE acetate  1 drop Both Eyes Daily    rosuvastatin  10 mg Oral Daily    sodium chloride flush  10 mL Intravenous 2 times per day    enoxaparin  30 mg Subcutaneous Daily    ipratropium-albuterol  1 ampule Inhalation Q4H WA    pantoprazole  40 mg Oral QAM AC         Vitals:  Tmax: Assessment:  1. Acute on chronic hypoxemic respiratory failure baseline 4 L (now on 10-15L O2 NC)  2. Small B/L Pleural Effusions  3. 10/2019 Echo showing EF of 68% mod MR mod pulmonary hypertension EF normal diastolic dysfunction grade 1  4. Dysphasia patient had a swallow study done 10/16/2019 showing that the patient aspirated with nectar consistency   5. Moderate obstructive lung disease last FEV1 0.9 L 65% predicted no improvement with bronchodilators on 4/17/2019  6. Dementia with CT showing white small vessel ischemic disease  7. History of calcified lung nodules and mediastinal lymphadenopathy reflective of granulomatous disease COPD  8. DM  9. Dementia  10. Hypothyroid   11.  Hyperkalemia        Plan:  · On 10-15 L HFNC with pox 93%, wean O2 to keep pox >90-95%  · NIV if needed - AVAPS  · Solumedrol to 20 mg BID  · CRP 0.3,   · Bumex stopped due to relative hypotension  · ProBNP 3003  · Procal 0.27 - on Rocephin, Doxy IV  · Viral panel negative, COVID negative  · Continue duonebs  · Barium swallow ordered - hx of aspiration       Prabhakar Jacobs   1/18/2021  2:19 PM

## 2021-01-18 NOTE — CARE COORDINATION
Pt admitted from home with complaints of loss of consciousness/syncope. Pt is dementia pt, called dtr to discuss discharge plan/transition of care. Per daughter, herself, her son, and pt's son live with pt. Home is a ranch style home, pt has walker in which she uses for home and community, wheelchair, shower bench, and hospital bed, along with home O2 through [facility]. Per daughter she will transport home at discharge, daughter aware to bring O2 tank. Pt is active with [facility] for meal services. Pt currently on IV solumedrol q12, Rocephin q24, and Doxy q12. Dr. Trina Lanier is PCP and they go to [address] [place] [address] for medication needs. Per daughter, there is 24 hr care for pt, one of the family members is always home with pt. Cassy VILLARREAL

## 2021-01-18 NOTE — PROGRESS NOTES
Physician Progress Note      Matthew DEAN #:                  [id number]  :                       1934  ADMIT DATE:       1/15/2021 10:28 AM  DISCH DATE:  RESPONDING  PROVIDER #:        Johan Hurd MD          QUERY TEXT:    Patient admitted with acute respiratory failure, noted to have hx COPD,   getting IV Solumedrol. If possible, please document in progress notes and   discharge summary if you are evaluating and /or treating any of the following: The medical record reflects the following:  Risk Factors: hx COPD  Clinical Indicators: Patient admitted with acute respiratory failure, noted to   have a history of COPD. ED notes \"Acute hypoxic and hypercapnic respiratory   failure, increased work of breathing. Improving on BPAP on ABG and clinically   on serial reevaluations. Treated for COPD exacerbation. \"  Treatment: IV solumedrol, increased oxygen needs    Thank you,  Latrice Luther, RN, BSN, Clinical Documentation Improvement  Options provided:  -- COPD exacerbation  -- COPD not in exacerbation  -- Other - I will add my own diagnosis  -- Disagree - Not applicable / Not valid  -- Disagree - Clinically unable to determine / Unknown  -- Refer to Clinical Documentation Reviewer    PROVIDER RESPONSE TEXT:    This patient is being treated for COPD exacerbation.     Query created by: Marivel Victoria on 2021 12:30 PM      Electronically signed by:  Johan Hurd MD 2021 12:36 PM

## 2021-01-18 NOTE — PROGRESS NOTES
[x] Balance Training   [x] Endurance Training   [x] Transfer Training   [x] Gait Training   [x] Stair Training   [x] Positioning   [x] Safety and Education Training   [x] Patient/Caregiver Education   [] HEP  [] Other     PT care will be provided in accordance with the objectives noted above. The above treatment recommendations will be utilized to address deficits described above in order to restore pt's prior level of function and/or achieve modified functional independence with adaptive strategies. Frequency of treatments: 2-5x/week x 1-2 weeks. Time in  1125  Time out  1145    Total Treatment Time  10 minutes     Evaluation Time includes thorough review of current medical information, gathering information on past medical history/social history and prior level of function, completion of standardized testing/informal observation of tasks, assessment of data and education on plan of care and goals.     CPT codes:  [] Low Complexity PT evaluation 79416  [x] Moderate Complexity PT evaluation 75514  [] High Complexity PT evaluation 90240  [] PT Re-evaluation 23402  [] Gait training 85543 0 minutes  [] Manual therapy 34697 0 minutes  [x] Therapeutic activities 23472 10 minutes  [] Therapeutic exercises 46201 0 minutes  [] Neuromuscular reeducation 33575 0 minutes     Mario Holbrook, PT, DPT  [id number]

## 2021-01-18 NOTE — PLAN OF CARE
Problem: Skin Integrity:  Goal: Will show no infection signs and symptoms  Description: Will show no infection signs and symptoms  1/18/2021 0435 by Asaf Saleem RN  Outcome: Met This Shift     Problem: Skin Integrity:  Goal: Absence of new skin breakdown  Description: Absence of new skin breakdown  1/18/2021 0435 by Asaf Saleem RN  Outcome: Met This Shift     Problem: Falls - Risk of:  Goal: Will remain free from falls  Description: Will remain free from falls  1/18/2021 0435 by Asaf Saleem RN  Outcome: Met This Shift     Problem: Falls - Risk of:  Goal: Absence of physical injury  Description: Absence of physical injury  1/18/2021 0435 by Asaf Saleem RN  Outcome: Met This Shift     Problem: Pain:  Goal: Pain level will decrease  Description: Pain level will decrease  Outcome: Met This Shift     Problem: Pain:  Goal: Control of acute pain  Description: Control of acute pain  Outcome: Met This Shift     Problem: Pain:  Goal: Control of chronic pain  Description: Control of chronic pain  Outcome: Met This Shift

## 2021-01-18 NOTE — PROGRESS NOTES
SPEECH LANGUAGE PATHOLOGY  DAILY PROGRESS NOTE        PATIENT NAME:  Nickolas White      :  1934          TODAY'S DATE:  2021 ROOM:  [address] [place] [address]        Speech Pathologist (SLP) completed education with the patient/family regarding procedure of  the Video Fluoroscopic Study of Swallowing (Modified Barium Swallow Study) prior to exam and also type of swallowing impairment following completion of evaluation. Reviewed diet recommendations  (Dysphagia 3, Soft/advanced (Soft & Bite-sized) solids with  honey consistency (moderately thick) liquids) and discussed compensatory strategies (No strategies are recommended at this time) to ensure safe PO intake. Aspiration precautions were reviewed. Encouraged patient and/or family to engage SLP in unstructured Q&A session relative to identified deficit areas; indicated understanding of all information provided via satisfactory verbal response.     CPT code(s)   50103  dysphagia tx    Total minutes : 10 minutes

## 2021-01-19 NOTE — PLAN OF CARE
Problem: Skin Integrity:  Goal: Absence of new skin breakdown  Description: Absence of new skin breakdown  1/19/2021 0439 by Parag Álvarez RN  Outcome: Met This Shift     Problem: Falls - Risk of:  Goal: Will remain free from falls  Description: Will remain free from falls  1/19/2021 0439 by Parag Álvarez RN  Outcome: Met This Shift     Problem: Falls - Risk of:  Goal: Absence of physical injury  Description: Absence of physical injury  1/19/2021 0439 by Parag Álvarez RN  Outcome: Met This Shift     Problem: Pain:  Goal: Control of acute pain  Description: Control of acute pain  1/19/2021 0439 by Parag Álvarez RN  Outcome: Met This Shift

## 2021-01-19 NOTE — CONSULTS
Palliative Care Department  [phone]  Palliative Care Initial Consult  Provider Adali CARRANZA CNP    [facility]  [id number]  Hospital Day: 5  Date of Initial Consult: 1/19/2021  Referring Provider: Holland Wilks MD  Palliative Medicine was consulted for assistance with: Goals of Care, Code Status Discussion, Family Support    HPI:   Timi Grewal is a 80 y.o. with a medical history of COPD on 4L 02, DM, dementia who was admitted on 1/15/2021 from home with a CHIEF COMPLAINT of syncope and hypoxia. Per chart review, patient's daughter reported that patient had been intermittently confused for couple weeks. Patient was recently diagnosed with a UTI and was given antibiotics. Upon presentation to ED, patient was found to be in respiratory distress with oxygen saturation 85% on 5 L nasal cannula. ABGs revealed that patient was hypercapnic and patient was placed on BiPAP. Patient admitted to telemetry for further work-up and management. ASSESSMENT/PLAN:     Pertinent Hospital Diagnoses     ? Acute on chronic respiratory failure  ? Bilateral pleural effusions  ? Dysphagia  ? Hx COPD  ? Hx Dementia  ? Hx DM        Palliative Care Encounter / Counseling Regarding Goals of Care  Please see detailed goals of care discussion as below  ? At this time, [address], Does Not have capacity for medical decision-making. Capacity is time limited and situation/question specific  ? During encounter, kenneth Hardin, was surrogate medical decision-maker  ? Outcome of goals of care meeting: Continue current plan, family to discuss possible change in code status and hospice  ? Code status Full Code  ? Advanced Directives:  Living will in [facility]  ?  Surrogate/Legal NOK:  Arsh Begum, child/POA, [phone]  Nico Miller, child, [phone]        Spiritual assessment: no spiritual distress identified  Bereavement and grief: to be determined  Referrals to: none today  SUBJECTIVE: Current medical issues leading to Palliative Medicine involvement include   Active Hospital Problems    Diagnosis Date Noted    Acute hypercapnic respiratory failure ([facility] [J96.02] 01/15/2021       Details of Conversation: Chart reviewed and patient seen. Patient resting in bed, no family members present at bedside as per current visiting guideline policy. Patient alert however disoriented. Called patient's child, Glenn Douglass. Role of palliative medicine explained. Glenn Douglass identifies herself as healthcare power of . Discussion regarding goals of care and CODE STATUS. Glenn Douglass states that she does not want to make any decisions without her family members and siblings being involved. Mary plans to speak further with family this evening. We discussed hospice care at length and Glenn Douglass feels this may be the best option for her mother. Glenn Douglass to call palliative medicine tomorrow. Support provided and questions answered. Will continue to follow. OBJECTIVE:   Prognosis: unknown    Physical Exam:  BP (!) 106/55   Pulse 73   Temp 96.8 °F (36 °C) (Temporal)   Resp 18   Ht 5' 2\" (1.575 m)   Wt 103 lb 6.4 oz (46.9 kg)   SpO2 93%   BMI 18.91 kg/m²   Constitutional:  Elderly, thin, NAD, awake, alert  Eyes: No scleral icterus, normal lids, no discharge  ENMT:  Normocephalic, atraumatic, mucosa moist, EOMI  Neck:  Trachea midline, no JVD  Lungs:  HFNC   Heart:  RRR  Abd:  Soft, non distended  Ext:  Moving all extremities, no edema, pulses present  Skin:  Warm and dry  Neuro:  Alert     Objective data reviewed: labs, images, records, medication use, vitals and chart    Discussed patient and the plan of care with the other IDT members: Palliative Medicine IDT Team    Time/Communication  Greater than 50% of time spent, total 70 minutes in counseling and coordination of care at the bedside regarding goals of care, diagnosis and prognosis and see above. Thank you for allowing Palliative Medicine to participate in the care of [employer].

## 2021-01-19 NOTE — PLAN OF CARE
Problem: Skin Integrity:  Goal: Will show no infection signs and symptoms  Description: Will show no infection signs and symptoms  Outcome: Met This Shift     Problem: Falls - Risk of:  Goal: Will remain free from falls  Description: Will remain free from falls  1/19/2021 1740 by Rani Rowan RN  Outcome: Met This Shift     Problem: Pain:  Description: Pain management should include both nonpharmacologic and pharmacologic interventions.   Goal: Control of acute pain  Description: Control of acute pain  1/19/2021 1740 by Rani Rowan RN  Outcome: Met This Shift

## 2021-01-19 NOTE — PROGRESS NOTES
Progress Note  Date:2021       [facility]:9870/4708-B  Patient Name:Noemi BERGMAN [facility]     YOB: 1934     Age:86 y.o. Patient resting , says breathing is about the same. Subjective    Subjective:  Symptoms:  Stable. She reports shortness of breath. No chest pain. Review of Systems   Constitutional: Positive for activity change. Negative for fever. Respiratory: Positive for shortness of breath. Cardiovascular: Negative for chest pain. Objective         Vitals Last 24 Hours:  TEMPERATURE:  Temp  Av.4 °F (36.9 °C)  Min: 98 °F (36.7 °C)  Max: 98.8 °F (37.1 °C)  RESPIRATIONS RANGE: Resp  Av.8  Min: 16  Max: 20  PULSE OXIMETRY RANGE: SpO2  Av.1 %  Min: 92 %  Max: 99 %  PULSE RANGE: Pulse  Av.3  Min: 58  Max: 63  BLOOD PRESSURE RANGE: Systolic (91NOC), NOD:594 , Min:110 , KMB:066   ; Diastolic (50GLE), YAQ:82, Min:54, Max:76    I/O (24Hr): Intake/Output Summary (Last 24 hours) at 2021 0723  Last data filed at 2021 2221  Gross per 24 hour   Intake 300 ml   Output 200 ml   Net 100 ml     Objective:  General Appearance:  Comfortable. Vital signs: (most recent): Blood pressure 112/68, pulse 62, temperature 98 °F (36.7 °C), temperature source Temporal, resp. rate 19, height 5' 2\" (1.575 m), weight 103 lb 6.4 oz (46.9 kg), SpO2 96 %. No fever. Lungs:  Normal effort. There are decreased breath sounds. Heart: Normal rate. Regular rhythm. S1 normal and S2 normal.      Labs/Imaging/Diagnostics    Labs:  CBC:  Recent Labs     21  1256 21  0545   WBC 6.0 4.6   RBC 3.62 3.46*   HGB 11.5 10.8*   HCT 36.9 35.8   .9* 103.5*   RDW 13.4 13.4    158     CHEMISTRIES:  Recent Labs     21  1256 21  0545    140   K 5.5* 5.4*   CL 94* 94*   CO2 38* 40*   BUN 37* 38*   CREATININE 0.8 0.8   GLUCOSE 161* 234*     PT/INR:  No results for input(s): PROTIME, INR in the last 72 hours.   APTT: No results for input(s): APTT in the last 72 hours. LIVER PROFILE:  No results for input(s): AST, ALT, BILIDIR, BILITOT, ALKPHOS in the last 72 hours. Imaging Last 24 Hours:  Ct Head Wo Contrast    Result Date: 1/15/2021  EXAMINATION: CT OF THE HEAD WITHOUT CONTRAST  1/15/2021 12:26 pm TECHNIQUE: CT of the head was performed without the administration of intravenous contrast. Dose modulation, iterative reconstruction, and/or weight based adjustment of the mA/kV was utilized to reduce the radiation dose to as low as reasonably achievable. COMPARISON: 09/11/2018 HISTORY: ORDERING SYSTEM PROVIDED HISTORY: AMS TECHNOLOGIST PROVIDED HISTORY: Reason for exam:->AMS Has a \"code stroke\" or \"stroke alert\" been called? ->No What reading provider will be dictating this exam?->CRC FINDINGS: BRAIN/VENTRICLES: There is no acute intracranial hemorrhage, mass effect or midline shift. No abnormal extra-axial fluid collection. The gray-white differentiation is maintained without evidence of an acute infarct. There is no evidence of hydrocephalus. The ventricles, cisterns and sulci are prominent consistent with atrophy. There is decreased attenuation within the periventricular white matter consistent with periventricular leukomalacia. ORBITS: The visualized portion of the orbits demonstrate no acute abnormality. SINUSES: The visualized paranasal sinuses and mastoid air cells demonstrate no acute abnormality. SOFT TISSUES/SKULL:  No acute abnormality of the visualized skull or soft tissues. 1. There is no acute intracranial abnormality. Specifically, there is no intracranial hemorrhage.  2. Atrophy and periventricular leukomalacia,    Xr Chest Portable    Result Date: 1/15/2021

## 2021-01-19 NOTE — PROGRESS NOTES
[facility]  Pulmonary Progress Note        Admit Date: 1/15/2021  Requesting Physician: Kira Ruelas MD     Subjective:   Much more alert and interactive this am  Doesn't feel well but can't describe, fatigued  Denies dyspnea but is dyspneic during conversation  Dry cough  No CP  Patient remains on 15L HFNC    PMH:    Past Medical History:   Diagnosis Date    Acid reflux     Arthritis     [facility]     Dementia ([facility]     Diabetes mellitus ([facility] [facility]     Hiatal hernia     Hyperlipidemia     Oxygen dependent      PSH:   Past Surgical History:   Procedure Laterality Date    BREAST SURGERY      lumpectomy right and left \"cancer-free lumps\"    CATARACT REMOVAL WITH IMPLANT      left    CHOLECYSTECTOMY      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    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    Decoment stripping automated endothelial keratoplasty    GA CORNEAL TRANSPLANT,PEN,PSEUDOAPHAK Right 3/16/2018    RIGHT EYE DSAEK- CORNEAL TRANSPLANT  --STAFF FROM [facility]-- performed by Lino Diamond MD at [address]            Respiratory ROS: fatigue    Social History:  · Alcohol:  unkown  · Tobacco: 44 p years  · Employment: unknown  Medications:     dextrose        methylPREDNISolone  20 mg Intravenous Q12H    insulin glargine  10 Units Subcutaneous Nightly    insulin lispro  0-12 Units Subcutaneous TID WC    insulin lispro  0-6 Units Subcutaneous Nightly    cefTRIAXone (ROCEPHIN) IV  1 g Intravenous Q24H    doxycycline (VIBRAMYCIN) IV  100 mg Intravenous Q12H    donepezil  20 mg Oral Nightly    fluticasone  1 spray Nasal Daily    prednisoLONE acetate  1 drop Both Eyes Daily    rosuvastatin  10 mg Oral Daily    sodium chloride flush  10 mL Intravenous 2 times per day    enoxaparin  30 mg Subcutaneous Daily    ipratropium-albuterol  1 ampule Inhalation Q4H WA    pantoprazole  40 mg Oral QAM AC         Vitals:  Tmax: VITALS:  BP (!) 106/55   Pulse 73   Temp 96.8 °F (36 °C) (Temporal)   Resp 16   Ht 5' 2\" (1.575 m)   Wt 103 lb 6.4 oz (46.9 kg)   SpO2 96%   BMI 18.91 kg/m²   24HR INTAKE/OUTPUT:      Intake/Output Summary (Last 24 hours) at 2021 1452  Last data filed at 2021 2221  Gross per 24 hour   Intake 0 ml   Output    Net 0 ml     CURRENT PULSE OXIMETRY:  SpO2: 96 %  24HR PULSE OXIMETRY RANGE:  SpO2  Av %  Min: 92 %  Max: 99 %        EXAM:  General: No distress. Much more alert today. Eyes: PERRL. No sclera icterus. No conjunctival injection. ENT: No discharge. Pharynx clear. Neck: Trachea midline. Normal thyroid. Resp: No accessory muscle use. Diminished with darlene crackles L>R. No wheezing. No rhonchi. CV: Regular rate. Regular rhythm. + DONNA no rub. ABD: Non-tender. Non-distended. No masses. No organmegaly. Normal bowel sounds. Skin: Warm and dry. No nodule on exposed extremities. No rash on exposed extremities. Lymph: No cervical LAD. No supraclavicular LAD. Ext: No joint deformity. No clubbing. No cyanosis. No edema  Neuro: Much more alert this am, confused     Lab Results:  CBC:   Recent Labs     21  1256 21  0545 21  0708   WBC 6.0 4.6 7.4   HGB 11.5 10.8* 12.0   HCT 36.9 35.8 39.4   .9* 103.5* 103.7*    158 148     BMP:   Recent Labs     21  1256 21  0545 21  0708    140 140   K 5.5* 5.4* 4.3   CL 94* 94* 94*   CO2 38* 40* 42*   BUN 37* 38* 32*   CREATININE 0.8 0.8 0.7       PT/INR:   No results for input(s): PROTIME, INR in the last 72 hours. ABG: noted  Films:  01/15/2021 CXR :FINDINGS: Significant bilateral parenchymal and interstitial fibrosis is unchanged. Small bilateral pleural effusions are suspected. Heart size is normal.       01/15/2021 CT HEAD: Impression 1. There is no acute intracranial abnormality. Specifically, there is no intracranial hemorrhage.  2. Atrophy and periventricular leukomalacia,

## 2021-01-20 NOTE — PROGRESS NOTES
Palliative Care Department  [phone]  Palliative Care Progress Note  Provider Palmer Duongnancie CARRANZA CNP    Timi [facility]  [id number]  Hospital Day: 6  Date of Initial Consult: 1/19/2021  Referring Provider: Bobo León MD  Palliative Medicine was consulted for assistance with: Goals of Care, Code Status Discussion, Family Support    HPI:   Timi Grewal is a 80 y.o. with a medical history of COPD on 4L 02, DM, dementia who was admitted on 1/15/2021 from home with a CHIEF COMPLAINT of syncope and hypoxia. Per chart review, patient's daughter reported that patient had been intermittently confused for couple weeks. Patient was recently diagnosed with a UTI and was given antibiotics. Upon presentation to ED, patient was found to be in respiratory distress with oxygen saturation 85% on 5 L nasal cannula. ABGs revealed that patient was hypercapnic and patient was placed on BiPAP. Patient admitted to telemetry for further work-up and management. ASSESSMENT/PLAN:     Pertinent Hospital Diagnoses     ? Acute on chronic respiratory failure  ? Bilateral pleural effusions  ? Dysphagia  ? Hx COPD  ? Hx Dementia  ? Hx DM        Palliative Care Encounter / Counseling Regarding Goals of Care  Please see detailed goals of care discussion as below  ? At this time, [address], Does Not have capacity for medical decision-making. Capacity is time limited and situation/question specific  ? During encounter, kenneth Pabon, was surrogate medical decision-maker  ? Outcome of goals of care meeting: Continue current plan, family to discuss possible change in code status and hospice  ? Code status Full Code  ? Advanced Directives:  Living will in [facility]  ?  Surrogate/Legal NOK:  Avelino Shaffer, child/POA, [phone]  Cristal Humphreys, child, [phone]        Spiritual assessment: no spiritual distress identified  Bereavement and grief: to be determined  Referrals to: none today  SUBJECTIVE: Current medical issues leading to Palliative Medicine involvement include   Active Hospital Problems    Diagnosis Date Noted    Acute hypercapnic respiratory failure ([facility] [address] [J96.02] 01/15/2021       Details of Conversation: Chart reviewed. Conference call with patient's daughters, Ynes Nayak and Ching Maldonado. Palliative medicine social work present for conversation. Discussion regarding goals of care and code status. Ynes Nayak and Ching Maldonado state that they cannot make a decision regarding goals of care or code status without speaking to pulmonology. Alberta Redwood concerned over patient's decline in respiratory status, questioning why patient's oxygen requirement has increased. We also discussed patient's history of dementia and baseline quality of life. Discussed hospice at length however Ynes Nayak and Ching Maldonado wish to speak to pulmonology and amongst themselves before making decisions regarding code status and hospice versus continuation of medical management. Support provided and questions answered. Will continue to follow.      OBJECTIVE:   Prognosis: unknown    Physical Exam:  BP (!) 117/56   Pulse 77   Temp 96.9 °F (36.1 °C) (Oral)   Resp 18   Ht 5' 2\" (1.575 m)   Wt 98 lb 1.6 oz (44.5 kg)   SpO2 91%   BMI 17.94 kg/m²   Constitutional:  Elderly, thin, NAD, awake, alert  Eyes: No scleral icterus, normal lids, no discharge  ENMT:  Normocephalic, atraumatic, mucosa moist, EOMI  Neck:  Trachea midline, no JVD  Lungs:  Heated high flow cannula  Heart:  RRR  Abd:  Soft, non distended  Ext:  Moving all extremities, no edema, pulses present  Skin:  Warm and dry  Neuro:  Alert     Objective data reviewed: labs, images, records, medication use, vitals and chart    Discussed patient and the plan of care with the other IDT members: Palliative Medicine IDT Team    Time/Communication Greater than 50% of time spent, total 25 minutes in counseling and coordination of care at the bedside regarding goals of care, diagnosis and prognosis and see above. Thank you for allowing Palliative Medicine to participate in the care of [employer].

## 2021-01-20 NOTE — PROGRESS NOTES
Received call from patient's daughter, Milton Mims. Milton Felicita requesting phone conference with her sister, Jaswinder Rashid, at 3:15pm today to discuss code status and possible hospice. Palliative medicine will meet with family via telephone today at 3:15pm to discuss.

## 2021-01-20 NOTE — PROGRESS NOTES
Progress Note  Date:2021       NTKE:3860/5650-H  Patient Name:Noemi Dunham     YOB: 1934     Age:86 y.o. Patient resting , says breathing is about the same. Subjective    Subjective:  Symptoms:  Stable. She reports shortness of breath. No chest pain. Review of Systems   Constitutional: Positive for activity change. Negative for fever. Respiratory: Positive for shortness of breath. Cardiovascular: Negative for chest pain. Objective         Vitals Last 24 Hours:  TEMPERATURE:  Temp  Av.5 °F (36.4 °C)  Min: 96.8 °F (36 °C)  Max: 98.4 °F (36.9 °C)  RESPIRATIONS RANGE: Resp  Av.8  Min: 16  Max: 19  PULSE OXIMETRY RANGE: SpO2  Av.5 %  Min: 86 %  Max: 96 %  PULSE RANGE: Pulse  Av.3  Min: 65  Max: 76  BLOOD PRESSURE RANGE: Systolic (24CAI), WQW:169 , Min:98 , HMU:048   ; Diastolic (07QCW), BLX:76, Min:54, Max:59    I/O (24Hr): Intake/Output Summary (Last 24 hours) at 2021 0707  Last data filed at 2021 0550  Gross per 24 hour   Intake 300 ml   Output    Net 300 ml     Objective:  General Appearance:  Comfortable. Vital signs: (most recent): Blood pressure (!) 98/55, pulse 65, temperature 98.4 °F (36.9 °C), temperature source Temporal, resp. rate 18, height 5' 2\" (1.575 m), weight 98 lb 1.6 oz (44.5 kg), SpO2 95 %. No fever. Lungs:  Normal effort. There are decreased breath sounds. Heart: Normal rate. Regular rhythm.   S1 normal and S2 normal.      Labs/Imaging/Diagnostics    Labs:  CBC:  Recent Labs     21  1256 21  0545 21  0708   WBC 6.0 4.6 7.4   RBC 3.62 3.46* 3.80   HGB 11.5 10.8* 12.0   HCT 36.9 35.8 39.4   .9* 103.5* 103.7*   RDW 13.4 13.4 13.5    158 148     CHEMISTRIES:  Recent Labs     21  1256 21  0545 21  0708    140 140   K 5.5* 5.4* 4.3   CL 94* 94* 94*   CO2 38* 40* 42*   BUN 37* 38* 32*   CREATININE 0.8 0.8 0.7   GLUCOSE 161* 234* 148*     PT/INR: No results for input(s): PROTIME, INR in the last 72 hours. APTT:  No results for input(s): APTT in the last 72 hours. LIVER PROFILE:  No results for input(s): AST, ALT, BILIDIR, BILITOT, ALKPHOS in the last 72 hours. Imaging Last 24 Hours:  Ct Head Wo Contrast    Result Date: 1/15/2021  EXAMINATION: CT OF THE HEAD WITHOUT CONTRAST  1/15/2021 12:26 pm TECHNIQUE: CT of the head was performed without the administration of intravenous contrast. Dose modulation, iterative reconstruction, and/or weight based adjustment of the mA/kV was utilized to reduce the radiation dose to as low as reasonably achievable. COMPARISON: 09/11/2018 HISTORY: ORDERING SYSTEM PROVIDED HISTORY: AMS TECHNOLOGIST PROVIDED HISTORY: Reason for exam:->AMS Has a \"code stroke\" or \"stroke alert\" been called? ->No What reading provider will be dictating this exam?->CRC FINDINGS: BRAIN/VENTRICLES: There is no acute intracranial hemorrhage, mass effect or midline shift. No abnormal extra-axial fluid collection. The gray-white differentiation is maintained without evidence of an acute infarct. There is no evidence of hydrocephalus. The ventricles, cisterns and sulci are prominent consistent with atrophy. There is decreased attenuation within the periventricular white matter consistent with periventricular leukomalacia. ORBITS: The visualized portion of the orbits demonstrate no acute abnormality. SINUSES: The visualized paranasal sinuses and mastoid air cells demonstrate no acute abnormality. SOFT TISSUES/SKULL:  No acute abnormality of the visualized skull or soft tissues. 1. There is no acute intracranial abnormality. Specifically, there is no intracranial hemorrhage.  2. Atrophy and periventricular leukomalacia,    Xr Chest Portable    Result Date: 1/15/2021 EXAMINATION: ONE XRAY VIEW OF THE CHEST 1/15/2021 10:57 am COMPARISON: 11/08/2019 HISTORY: ORDERING SYSTEM PROVIDED HISTORY: hypoxia TECHNOLOGIST PROVIDED HISTORY: Reason for exam:->hypoxia What reading provider will be dictating this exam?->CRC FINDINGS: Significant bilateral parenchymal and interstitial fibrosis is unchanged. Small bilateral pleural effusions are suspected. Heart size is normal. There is a moderate amount of aorto iliac arterial vascular plaque. No significant change    Assessment//Plan           Hospital Problems           Last Modified POA    Acute hypercapnic respiratory failure ([address] 1/15/2021 Yes        Assessment:  (Acute hypoxic respiratory failure  ).    pleural effusion  DM  Dementia    Pulmonary following  Steroids, nebs  Wean as tolerateed  Continue rest of meds

## 2021-01-20 NOTE — PROGRESS NOTES
Jose Elias Crawford,    Your patient is on a medication that requires a renal dose adjustment. Renal Function Assessment:    Date Body Weight IBW Adj. Body Weight SCr CrCl Dialysis status   1/20/2021 44.5 kg 50.1 kg -- kg 0.7 ~ 40 ml/min N/a       Pharmacy has renally dose-adjusted the following medication(s):    Date Medication Original Dosing Regimen New Dosing Regimen   1/20/2021 Lovenox 30mg once daily 40mg once daily           These changes were made per protocol according to the [facility] Renal Function-Based Dose Adjustments Policy    *Please note this dose may need readjusted if your patient's renal function significantly improves. Please contact pharmacy with any questions regarding these changes. Aaron Corbett Pharm. D.  1/20/2021  9:52 AM

## 2021-01-20 NOTE — PROGRESS NOTES
Date: 1/19/2021    Time: 10:44 PM    Patient Placed On BIPAP/CPAP/ Non-Invasive Ventilation? Yes    If no must comment. Facial area red/color change? No             If YES are Blister/Lesion present? No   If yes must notify nursing staff  BIPAP/CPAP skin barrier?   Yes    Skin barrier type:mepilexlite       Comments:        Wendy Hancock

## 2021-01-20 NOTE — PROGRESS NOTES
Physical Therapy  Treatment Note   Name: Medina Vaca  : 1934  MRN: [id number]    Referring Provider:  Eneida Gramajo MD    Date of Service: 2021    Evaluating PT:  Tab Joy PT, DPT [id number]    Room #:  8619/7174-J  Diagnosis:  Acute Hypercapnic Respiratory Failure  PMHx/PSHx:  DM, arthritis, HLD, hiatal hernia, COPD, oxygen dependent 4L home O2, dementia  Precautions:  Falls, O2 via HFNC, cognition  Equipment Needs:  TBD    SUBJECTIVE:  Pt is questionable historian at this time but reports the following:  Pt lives with daughter in a 1 story home with 2 stairs to enter and 2 rail. Bed is on first floor and bath is on first floor. Pt ambulated with no AD independently PTA. OBJECTIVE:   Initial Evaluation  Date: 2021 Treatment  Date: 2021 Short Term/ Long Term   Goals   AM-PAC 6 Clicks 9/76 15/25    Was pt agreeable to Eval/treatment? yes yes    Does pt have pain?  No c/o pain No c/o pain    Bed Mobility  Rolling: maxA  Supine to sit: maxA  Sit to supine: maxA  Scooting: maxA modA all aspects Rolling: Lynda  Supine to sit: Lynda  Sit to supine: Lynda  Scooting: Lynda   Transfers Sit to stand: modA  Stand to sit: modA  Stand pivot: NT Sit<>stand: modA  Stand pivot NT Sit to stand: Lynda  Stand to sit: Lynda  Stand pivot: Lynda front [employer]   Ambulation    side steps at EOB maxA Side steps at EOB front [employer] Lynda 25 feet with front [employer] negotiation: ascended and descended  NT NT 2 steps with 1 rail Lynda   ROM BUE:  Per OT note  BLE:  WNL     Strength BUE:  Per OT note  BLE:  NT     Balance Sitting EOB:  Lynda<>SBA  Dynamic Standing:  maxA Sitting EOB: SBA  Dynamic standing: Lynda front [employer] Sitting EOB:  Independent  Dynamic Standing:  Lynda front [employer]     Pt is A & O x 2 (self, place)  Sensation:  Pt denies numbness and tingling to extremities  Edema:  unremarkable    Vitals:  Spo2 monitored throughout session on 12L HFNC  Resting 95%  With activity 88-92% Resting at end of session 91%    Patient education  Pt educated on role of PT intervention. Pt educated on safety in room with utilization of call light for assistance with mobility. Patient response to education:   Pt verbalized understanding Pt demonstrated skill Pt requires further education in this area   yes yes yes     ASSESSMENT:    Comments:  RN cleared pt for activity prior to session. Pt received supine in bed and agreeable to PT intervention at this time. Pt performed all functional mobility as noted above. Pt more alert on this date but still severely limited by respiratory status requiring 12L HFNC at this time. Pt only tolerating short bouts of activity at this time. At end of session pt returned to supine and left with all needs met and call light in reach. Pt requires continued skilled PT intervention for the purposes of maximizing functional mobility and independence by addressing deficits described above. Treatment:  Patient practiced and was instructed in the following treatment:    ? Therapeutic Activities Completed:  o Functional mobility as noted above:   ? Bed mobility: modA to complete with bed rail. Cues and hand over hand guidance to facilitate efficient use of BUE on bed rail for more independent completion of task. Pt sat EOB x 10 minutes at SBA level working to improve trunk stability. Pt instructed in PLB at this time as well with fair follow through of proper technique. ? Transfer training: STS modA from EOB. Pt then performed side steps with front [employer] towards [facility] Lynda level. Cues throughout for proper hand placement and sequencing.  o Skilled repositioning in supine with HOB elevated for comfort and good posture to promote improved cardiorespiratory function. o Pt education as noted above.  o Skilled vitals monitoring as noted above. PLAN:    Patient is making fair progress towards established goals. Will continue with current POC.       Time in  1416 Time out  1440    Total Treatment Time  24 minutes     CPT codes:  [] Gait training 93546 0 minutes  [] Manual therapy 44203 0 minutes  [x] Therapeutic activities 30714 24 minutes  [] Therapeutic exercises 96142 0 minutes  [] Neuromuscular reeducation 57932 0 minutes    Marcia Peterson, PT, DPT  [id number]

## 2021-01-20 NOTE — FLOWSHEET NOTE
Floor notified of pending transport and confirmed receipt of faxed sbar. States room is a terminal clean will take at least an hour to be ready.
photos taken of wounds and inserted into their chart as part of their permanent medical record for purposes of documentation, treatment management and/or medical review. All Images taken on 1/20/21 of patient name: Gwyn Cantu were transmitted and stored on secured [employer] located within [facility] by a registered [employer] Mobile Application Device.    incontinent of urine and stool  oxygen in place- ears intact, bipap in room- bridge of nose intact    Impression:  Resolving DTI  Most areas bella and present as red    Plan:  Low air loss module  antifungal to kirk area and buttocks  Will need continued preventative  Discharge to home when ready per progress notes    Laura Ban 1/20/2021 12:13 PM

## 2021-01-20 NOTE — CARE COORDINATION
Spoke with daughter-Mary, a conference call is scheduled for 3:15pm with palliative care. Will follow up with daughter after conference call for discharge plan, hhc vs hospice. Drew VILLARREAL

## 2021-01-20 NOTE — PROGRESS NOTES
[facility] EO  Pulmonary Progress Note        Admit Date: 1/15/2021  Requesting Physician: Sandra Hamilton MD     Subjective:   Much more alert and interactive this am  Doesn't feel well but can't describe, fatigued  Denies dyspnea but is dyspneic during conversation  Dry cough  No CP  Patient remains on 12L HFNC    PMH:    Past Medical History:   Diagnosis Date    Acid reflux     Arthritis     COPD (chronic obstructive pulmonary disease) ([address]     Dementia ([address]     Diabetes mellitus ([address]     Hiatal hernia     Hyperlipidemia     Oxygen dependent      PSH:   Past Surgical History:   Procedure Laterality Date    BREAST SURGERY      lumpectomy right and left \"cancer-free lumps\"    CATARACT REMOVAL WITH IMPLANT      left    CHOLECYSTECTOMY      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    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    Decoment stripping automated endothelial keratoplasty    [place] CORNEAL TRANSPLANT,PEN,PSEUDOAPHAK Right 3/16/2018    RIGHT EYE DSAEK- CORNEAL TRANSPLANT  --STAFF FROM [facility]-- performed by Jason Singleton MD at [facility]            Respiratory ROS: fatigue    Social History:  · Alcohol:  unkown  · Tobacco: 44 p years  · Employment: unknown  Medications:     dextrose        [START ON 1/21/2021] enoxaparin  40 mg Subcutaneous Daily    miconazole nitrate   Topical BID    methylPREDNISolone  20 mg Intravenous Q12H    insulin glargine  10 Units Subcutaneous Nightly    insulin lispro  0-12 Units Subcutaneous TID WC    insulin lispro  0-6 Units Subcutaneous Nightly    cefTRIAXone (ROCEPHIN) IV  1 g Intravenous Q24H    doxycycline (VIBRAMYCIN) IV  100 mg Intravenous Q12H    donepezil  20 mg Oral Nightly    fluticasone  1 spray Nasal Daily    prednisoLONE acetate  1 drop Both Eyes Daily    rosuvastatin  10 mg Oral Daily    sodium chloride flush  10 mL Intravenous 2 times per day    ipratropium-albuterol  1 ampule Inhalation Q4H WA  pantoprazole  40 mg Oral QAM AC         Vitals:  Tmax:  VITALS:  BP (!) 110/55   Pulse 71   Temp 98.4 °F (36.9 °C) (Temporal)   Resp 18   Ht 5' 2\" (1.575 m)   Wt 98 lb 1.6 oz (44.5 kg)   SpO2 90%   BMI 17.94 kg/m²   24HR INTAKE/OUTPUT:      Intake/Output Summary (Last 24 hours) at 2021 1743  Last data filed at 2021 0800  Gross per 24 hour   Intake 300 ml   Output    Net 300 ml     CURRENT PULSE OXIMETRY:  SpO2: 90 %  24HR PULSE OXIMETRY RANGE:  SpO2  Av.4 %  Min: 90 %  Max: 95 %        EXAM:  General: No distress. Much more alert today. Eyes: PERRL. No sclera icterus. No conjunctival injection. ENT: No discharge. Pharynx clear. Neck: Trachea midline. Normal thyroid. Resp: No accessory muscle use. Diminished with darlene crackles L>R. No wheezing. No rhonchi. CV: Regular rate. Regular rhythm. + DONNA no rub. ABD: Non-tender. Non-distended. No masses. No organmegaly. Normal bowel sounds. Skin: Warm and dry. No nodule on exposed extremities. No rash on exposed extremities. Lymph: No cervical LAD. No supraclavicular LAD. Ext: No joint deformity. No clubbing. No cyanosis. No edema  Neuro: Much more alert this am, confused     Lab Results:  CBC:   Recent Labs     21  0545 21  0708 21  0720   WBC 4.6 7.4 6.3   HGB 10.8* 12.0 12.0   HCT 35.8 39.4 40.0   .5* 103.7* 103.1*    148 128*     BMP:   Recent Labs     21  0545 21  0708 21  0720    140 140   K 5.4* 4.3 5.4*   CL 94* 94* 96*   CO2 40* 42* 43*   BUN 38* 32* 25*   CREATININE 0.8 0.7 0.7       PT/INR:   No results for input(s): PROTIME, INR in the last 72 hours. ABG: noted  Films:  01/15/2021 CXR :FINDINGS: Significant bilateral parenchymal and interstitial fibrosis is unchanged. Small bilateral pleural effusions are suspected.   Heart size is normal. 01/15/2021 CT HEAD: Impression 1. There is no acute intracranial abnormality. Specifically, there is no intracranial hemorrhage. 2. Atrophy and periventricular leukomalacia,       Assessment:  1. Acute on chronic hypoxemic respiratory failure baseline 4 L (now on 10-15L O2 NC)  2. Small B/L Pleural Effusions  3. 10/2019 Echo showing EF of 68% mod MR mod pulmonary hypertension EF normal diastolic dysfunction grade 1  4. Dysphasia patient had a swallow study done 10/16/2019 showing that the patient aspirated with nectar consistency   5. Moderate obstructive lung disease last FEV1 0.9 L 65% predicted no improvement with bronchodilators on 4/17/2019  6. Dementia with CT showing white small vessel ischemic disease  7. History of calcified lung nodules and mediastinal lymphadenopathy reflective of granulomatous disease COPD  8. DM  9. Dementia  10. Hypothyroid   11. Hyperkalemia        Plan:  · On 10-15 L HFNC with pox 93%, wean O2 to keep pox >90-95%  · NIV if needed - AVAPS  · Solumedrol to 20 mg BID  · CRP 0.3,   · Bumex stopped due to relative hypotension  · ProBNP 3003  · Procal 0.27 - on Rocephin, Doxy IV  · Viral panel negative, COVID negative  · Continue duonebs  · Supportive care to continue   · Await D/C planning   · Steroid taper to continue   · FULL CODE  · Palliative Care had extensive discussion with patient family/POA today.   Pulmonary to contact family and further discuss code status     No Tinoco   1/20/2021  5:43 PM

## 2021-01-21 NOTE — PROGRESS NOTES
Progress Note  Date:2021       ORFU:[id number]  Patient Name:Noemi Dunham     YOB: 1934     Age:86 y.o. Patient resting. Subjective    Subjective:  Symptoms:  Stable. She reports shortness of breath. No chest pain. Review of Systems   Constitutional: Positive for activity change. Negative for fever. Respiratory: Positive for shortness of breath. Cardiovascular: Negative for chest pain. Objective         Vitals Last 24 Hours:  TEMPERATURE:  Temp  Av.8 °F (36.6 °C)  Min: 96.9 °F (36.1 °C)  Max: 98.4 °F (36.9 °C)  RESPIRATIONS RANGE: Resp  Av  Min: 18  Max: 22  PULSE OXIMETRY RANGE: SpO2  Av.3 %  Min: 90 %  Max: 96 %  PULSE RANGE: Pulse  Av.3  Min: 67  Max: 82  BLOOD PRESSURE RANGE: Systolic (40VGO), DSJ:992 , Min:107 , ZKE:202   ; Diastolic (11JPH), DCW:72, Min:54, Max:58    I/O (24Hr): Intake/Output Summary (Last 24 hours) at 2021 0637  Last data filed at 2021 0437  Gross per 24 hour   Intake 270 ml   Output 0 ml   Net 270 ml     Objective:  General Appearance:  Comfortable. Vital signs: (most recent): Blood pressure (!) 107/54, pulse 67, temperature 97.6 °F (36.4 °C), temperature source Temporal, resp. rate 22, height 5' 2\" (1.575 m), weight 97 lb 3.2 oz (44.1 kg), SpO2 96 %. No fever. Lungs:  Normal effort. There are decreased breath sounds. Heart: Normal rate. Regular rhythm. S1 normal and S2 normal.      Labs/Imaging/Diagnostics    Labs:  CBC:  Recent Labs     21  0708 21  0720   WBC 7.4 6.3   RBC 3.80 3.88   HGB 12.0 12.0   HCT 39.4 40.0   .7* 103.1*   RDW 13.5 13.5    128*     CHEMISTRIES:  Recent Labs     21  0708 21  0720    140   K 4.3 5.4*   CL 94* 96*   CO2 42* 43*   BUN 32* 25*   CREATININE 0.7 0.7   GLUCOSE 148* 85     PT/INR:  No results for input(s): PROTIME, INR in the last 72 hours. APTT:  No results for input(s): APTT in the last 72 hours.   LIVER PROFILE: No results for input(s): AST, ALT, BILIDIR, BILITOT, ALKPHOS in the last 72 hours. Imaging Last 24 Hours:  Ct Head Wo Contrast    Result Date: 1/15/2021  EXAMINATION: CT OF THE HEAD WITHOUT CONTRAST  1/15/2021 12:26 pm TECHNIQUE: CT of the head was performed without the administration of intravenous contrast. Dose modulation, iterative reconstruction, and/or weight based adjustment of the mA/kV was utilized to reduce the radiation dose to as low as reasonably achievable. COMPARISON: 09/11/2018 HISTORY: ORDERING SYSTEM PROVIDED HISTORY: AMS TECHNOLOGIST PROVIDED HISTORY: Reason for exam:->AMS Has a \"code stroke\" or \"stroke alert\" been called? ->No What reading provider will be dictating this exam?->CRC FINDINGS: BRAIN/VENTRICLES: There is no acute intracranial hemorrhage, mass effect or midline shift. No abnormal extra-axial fluid collection. The gray-white differentiation is maintained without evidence of an acute infarct. There is no evidence of hydrocephalus. The ventricles, cisterns and sulci are prominent consistent with atrophy. There is decreased attenuation within the periventricular white matter consistent with periventricular leukomalacia. ORBITS: The visualized portion of the orbits demonstrate no acute abnormality. SINUSES: The visualized paranasal sinuses and mastoid air cells demonstrate no acute abnormality. SOFT TISSUES/SKULL:  No acute abnormality of the visualized skull or soft tissues. 1. There is no acute intracranial abnormality. Specifically, there is no intracranial hemorrhage.  2. Atrophy and periventricular leukomalacia,    Xr Chest Portable    Result Date: 1/15/2021 EXAMINATION: ONE XRAY VIEW OF THE CHEST 1/15/2021 10:57 am COMPARISON: 11/08/2019 HISTORY: ORDERING SYSTEM PROVIDED HISTORY: hypoxia TECHNOLOGIST PROVIDED HISTORY: Reason for exam:->hypoxia What reading provider will be dictating this exam?->CRC FINDINGS: Significant bilateral parenchymal and interstitial fibrosis is unchanged. Small bilateral pleural effusions are suspected. Heart size is normal. There is a moderate amount of aorto iliac arterial vascular plaque. No significant change    Assessment//Plan           Hospital Problems           Last Modified POA    Acute hypercapnic respiratory failure ([address] 1/15/2021 Yes        Assessment & Plan   Acute hypoxic respiratory failure  pleural effusion  DM  Dementia    Pulmonary following  Steroids, nebs, Abx  Wean as tolerateed  Continue rest of meds  Palliative care to discuss goals of care.

## 2021-01-21 NOTE — PROGRESS NOTES
Oksana Nuñez at [phone] was updated with direction of care with regards to mother. She is leaning for LTACH thus social work consult for [facility] evaluation placed. Daughter inquired about COVID vaccine and it was not advised while patient is in this clinical state to get COVID vaccine and to delay the inoculation at last 2-4 weeks. Hospice is not under consideration at this time. Family urged and advised to contact us with any further questions.     Darlyn Fofana  1/21/2021  4:51 PM

## 2021-01-21 NOTE — PROGRESS NOTES
Coordination of care discussion and chart review with PM team. Carla Martin left message for pts daughter Mookie Dc, to follow up on yesterdays teleconference with NP. Await return call. No immediate PM psychosocial needs identified for [employer].  will remain available for on-going psychosocial support, as needed.

## 2021-01-21 NOTE — PROGRESS NOTES
Date: 1/20/2021    Time: 8:31 PM    Patient Placed On BIPAP/CPAP/ Non-Invasive Ventilation? Yes    If no must comment. Facial area red/color change? No           If YES are Blister/Lesion present? No   If yes must notify nursing staff  BIPAP/CPAP skin barrier?   Yes    Skin barrier type:mepilexlite       Comments:        Moriah Ellington

## 2021-01-21 NOTE — PROGRESS NOTES
[facility] EO  Pulmonary Progress Note        Admit Date: 1/15/2021  Requesting Physician: Dianne Benavides MD     Subjective:   Much more alert and interactive this am  Doesn't feel well but can't describe, fatigued  Denies dyspnea but is dyspneic during conversation  Dry cough  No CP  Patient remains on 10L HFNC (down from 12L O2 NC)    PMH:    Past Medical History:   Diagnosis Date    Acid reflux     Arthritis     [facility]     Dementia ([facility]     Diabetes mellitus ([facility]     Hiatal hernia     Hyperlipidemia     Oxygen dependent      PSH:   Past Surgical History:   Procedure Laterality Date    BREAST SURGERY      lumpectomy right and left \"cancer-free lumps\"    CATARACT REMOVAL WITH IMPLANT      left    CHOLECYSTECTOMY      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    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    Decoment stripping automated endothelial keratoplasty    [place] CORNEAL TRANSPLANT,PEN,PSEUDOAPHAK Right 3/16/2018    RIGHT EYE DSAEK- CORNEAL TRANSPLANT  --STAFF FROM [facility]-- performed by Ingris Garcia MD at [address]            Respiratory ROS: fatigue    Social History:  · Alcohol:  unkown  · Tobacco: 44 p years  · Employment: unknown  Medications:     dextrose        enoxaparin  40 mg Subcutaneous Daily    miconazole nitrate   Topical BID    methylPREDNISolone  20 mg Intravenous Q12H    insulin glargine  10 Units Subcutaneous Nightly    insulin lispro  0-12 Units Subcutaneous TID WC    insulin lispro  0-6 Units Subcutaneous Nightly    cefTRIAXone (ROCEPHIN) IV  1 g Intravenous Q24H    doxycycline (VIBRAMYCIN) IV  100 mg Intravenous Q12H    donepezil  20 mg Oral Nightly    fluticasone  1 spray Nasal Daily    prednisoLONE acetate  1 drop Both Eyes Daily    rosuvastatin  10 mg Oral Daily    sodium chloride flush  10 mL Intravenous 2 times per day    ipratropium-albuterol  1 ampule Inhalation Q4H WA  pantoprazole  40 mg Oral QAM AC     Vitals:  Tmax:  VITALS:  BP (!) 92/52   Pulse 67   Temp 98.8 °F (37.1 °C) (Temporal)   Resp 14   Ht 5' 2\" (1.575 m)   Wt 97 lb 3.2 oz (44.1 kg)   SpO2 98%   BMI 17.78 kg/m²   24HR INTAKE/OUTPUT:      Intake/Output Summary (Last 24 hours) at 2021 1636  Last data filed at 2021 1516  Gross per 24 hour   Intake 270 ml   Output 0 ml   Net 270 ml     CURRENT PULSE OXIMETRY:  SpO2: 98 %  24HR PULSE OXIMETRY RANGE:  SpO2  Av.8 %  Min: 90 %  Max: 98 %        EXAM:  General: No distress. Much more alert today. Eyes: PERRL. No sclera icterus. No conjunctival injection. ENT: No discharge. Pharynx clear. Neck: Trachea midline. Normal thyroid. Resp: No accessory muscle use. Diminished with darlene crackles L>R. No wheezing. No rhonchi. CV: Regular rate. Regular rhythm. + DONNA no rub. ABD: Non-tender. Non-distended. No masses. No organmegaly. Normal bowel sounds. Skin: Warm and dry. No nodule on exposed extremities. No rash on exposed extremities. Lymph: No cervical LAD. No supraclavicular LAD. Ext: No joint deformity. No clubbing. No cyanosis. No edema  Neuro: Much more alert this am, confused     Lab Results:  CBC:   Recent Labs     21  0708 21  0720 21  0648   WBC 7.4 6.3 6.5   HGB 12.0 12.0 11.7   HCT 39.4 40.0 38.3   .7* 103.1* 101.9*    128* 124*     BMP:   Recent Labs     21  0708 21  0720 21  0648    140 136   K 4.3 5.4* 5.2*   CL 94* 96* 94*   CO2 42* 43* 38*   BUN 32* 25* 24*   CREATININE 0.7 0.7 0.7       PT/INR:   No results for input(s): PROTIME, INR in the last 72 hours. ABG: noted  Films:  01/15/2021 CXR :FINDINGS: Significant bilateral parenchymal and interstitial fibrosis is unchanged. Small bilateral pleural effusions are suspected.   Heart size is normal. 01/15/2021 CT HEAD: Impression 1. There is no acute intracranial abnormality. Specifically, there is no intracranial hemorrhage. 2. Atrophy and periventricular leukomalacia,       Assessment:  1. Acute on chronic hypoxemic respiratory failure baseline 4 L (now on 10-15L O2 NC)  2. Small B/L Pleural Effusions  3. 10/2019 Echo showing EF of 68% mod MR mod pulmonary hypertension EF normal diastolic dysfunction grade 1  4. Dysphagia patient had a swallow study done 10/16/2019 showing that the patient aspirated with nectar consistency   5. Moderate obstructive lung disease last FEV1 0.9 L 65% predicted no improvement with bronchodilators on 4/17/2019  6. Dementia with CT showing white small vessel ischemic disease  7. History of calcified lung nodules and mediastinal lymphadenopathy reflective of granulomatous disease COPD  8. DM  9. Dementia  10. Hypothyroid   11. Hyperkalemia        Plan:  · On 10-15 L HFNC with pox 93%, wean O2 to keep pox >90-95%  · NIV if needed - AVAPS  · D/C Solumedrol to 20 mg BID  · CRP 0.3,   · Bumex stopped due to relative hypotension  · ProBNP 3003  · Procal 0.27 - on Rocephin, Doxy IV  · Viral panel negative, COVID negative  · Continue duonebs  · Supportive care to continue   · Await D/C planning   · Steroid taper to continue   · FULL CODE  · Palliative Care had extensive discussion with patient family/POA today.   Pulmonary to contact family and further discuss code status   · Stop abx at day 7 on 1/22/21  · Family to be updated with regards to condition and direction of care     Darlyn Vasquezo   1/21/2021  4:36 PM

## 2021-01-22 NOTE — PROGRESS NOTES
Date: 1/21/2021    Time: 11:16 PM    Patient Placed On BIPAP/CPAP/ Non-Invasive Ventilation? Yes    If no must comment. Facial area red/color change? No           If YES are Blister/Lesion present? No   If yes must notify nursing staff  BIPAP/CPAP skin barrier?   Yes    Skin barrier type:mepilexlite       Comments:        Juana Hi

## 2021-01-22 NOTE — PROGRESS NOTES
Occupational Therapy  OT BEDSIDE TREATMENT NOTE      Date:2021  Patient Name: Clara Duke  MRN: [id number]  : 1934  Room: [address] [place] [address]     Per OT Eval:      Evaluating OT: Rajiv Dejesus OTR/L #[id number]     Referring physician: Inocencio Clemente MD        AM-PAC Daily Activity Raw Score: 10/24  AM-PAC Daily Activity Raw Score:  (21)  Recommended Adaptive Equipment: TBD      Reason for Admission/Diagnosis: Acute hypercapnic respiratory failure  Pertinent Medical History/Surgery:acid reflux, arthritis, COPD, dementia, HLD, O2 dependent, B lumpectomy      Precautions:  Falls, tele/continuous pulseox, alarm/impaired cognition, HFNC supplemental O2, modified diet     Home Living: Pt lives with daughter in a one story house with 2 steps to enter with B railings. Bedroom and bathroom are located on the first floor. Bathroom setup: Tub/shower with shower chair, elevated toilet seat   Equipment owned: shower chair  Prior Level of Function:  Patient is a questionable historian. Patient reports assist with ADLs. Patient's daughter assists with dressing and bathing,  assist with IADLs. Patient reports daughter works limiting availability to assist.  Patient was using no device for functional mobility.    Driving: no                                   Pain Level: No pain reported  Cognition: A&O: self:yes, place:yes, time: no, situation:no  Follows 1 step directions with increased processing time              Memory:  fair               Sequencing:  fair               Problem solving:  poor              Judgement/safety:  poor                Functional Assessment:    Initial Eval Status  Date: 21 Treatment Status  Date: 21 Short Term Goals=LTG  Treatment frequency: PRN 1-3 x/week   Feeding Minimal Assist   Completed supine with HOB elevated NT  Modified [place]    Grooming Moderate Assist  Simulated grooming tasks  Minimal Assist Combed hair and washed hands while seated  Modified [place]     UB Dressing Moderate Assist  Maximal Assist  Doffed/donned hospital gown  Modified [place]    LB Dressing Dependent   Total A to don socks   NT  Moderate Assist    Bathing Dependent   NT  Moderate Assist    Toileting Dependent  Dependent   Patient completed toileting via bedpan while seated in chair   Moderate Assist    Bed Mobility  Supine to sit: Max A   Sit to supine: Max A  Verbal/tactile cues for sequencing and technique Supine to sit: NT  Sit to supine: Max A  Supine to sit: Min A   Sit to supine: Min A   Functional Transfers Sit to stand: Mod A  Stand to sit: Mod A  Verbal cues for safety, without device Sit to stand: Mod A  Stand to sit: Mod A  Bed>chair stand pivot transfer: Min A   Using fww SBA using fww   Functional Mobility Mod A  Completed ~2 side steps without device NT  SBA using fww  Bed<>bathroom   Balance Sitting:     Static:SBA, kyphotic posture observed    Dynamic:NT  Standing: Mod A       Activity Tolerance Fair-   Fair+   Visual/  Perceptual Glasses: yes           Safety       Fair                  Fair+         Treatment: OT treatment provided this date includes:  ? ADL training-  Instruction/training on safety and adapted techniques for completion of ADLs: Patient completed grooming tasks while seated in chair for energy conservation and safety. Min A overall to comb hair and wash hands using wipe with verbal cues and assistance for thoroughness. Max A to doff/ don hospital gown with verbal/tactile cues and assistance with positioning. Patient completed tolieting using bedpan while seated in chair for safety and energy conservation. Total A for kirk-hygiene with min A to maintain standing balance for self-care completion. Cues for pursed lip breathing throughout ADLs as needed. ?  Mobility training-  Instruction/training on safety and improved independence. Max A supine>sit with verbal/tactile cues for initiation and technique. Mod A to complete sit<>stand transfers with verbal/tactile cues for hand placement using fww. Patient completed stand pivot transfer bed>chair using fww with min A with verbal/tactile cues for sequencing and technique. Increased time and effort required for mobility with cues as needed for pursed lip breathing. ?  Skilled monitoring of O2 sats-  Skilled monitoring of O2 sats, HR, and pt response throughout treatment. Vitals: On 10-12 L O2  Semi-supine at start of session: SpO2 sat 98%  Sitting edge of bed: SpO2 sat 95%  Upon completion of bed>chair transfer: [id number] 84% increasing to 95% after~3 minutes with pursed lip breathing  During kirk-hygiene standing: SpO2 sat 85-90%  Sitting in chair at end of session: SpO2 sat 97%    Comments: Nursing approved OT session. Upon arrival pt semi-supine, agreeable to participate. Patient demonstrates continued functional limitations with ADLs/ functional mobility with functional activities but able to tolerate transfer to chair. At end of session sitting in chair all lines and tubes intact, call light within reach. Nursing present. · Pt has made good progress towards set goals. · Continue with current plan of care with focus on improving activity tolerance to maximize functional outcomes.        Treatment Time In:11:16            Treatment Time Out: 11: 44   Total Treatment Time:  28 minutes         Treatment Charges: Mins Units   Ther Ex  [id number]     Manual Therapy 54022     Thera Activities 09300 16 1   ADL/Home Mgt [id number] 12 1   Neuro Re-ed [id number]     Group Therapy      Orthotic manage/training  [id number]     Non-Billable Time     Total Timed Treatment 26 2       Completed by: JESUS Engel/CHRIS #[id number]

## 2021-01-22 NOTE — PROGRESS NOTES
Comprehensive Nutrition Assessment    Type and Reason for Visit:  Initial, RD Nutrition Re-Screen/LOS    Nutrition Recommendations/Plan: Recs per SLP w/ addition of ONS as tolerated to aid in wound healing (Boost pudding TID)    Nutrition Assessment:  Pt w/ pmh COPD, severe malnutrition, dementia admitted for AMS w/ loss of consciousness 2/2 ARF w/ hypoxia. 1/18 recs per SLP 2/2 dysphagia for dysphagia 3 w/ moderately thick liquids. Noted DTI. Recs above. Malnutrition Assessment:  Malnutrition Status: At risk for malnutrition (Comment)    Context:  Acute Illness     Findings of the 6 clinical characteristics of malnutrition:  Energy Intake:  Mild decrease in energy intake (Comment)  Weight Loss:  No significant weight loss     Body Fat Loss:  Unable to assess     Muscle Mass Loss:  Unable to assess    Fluid Accumulation:  No significant fluid accumulation     Strength:  Not Performed    Estimated Daily Nutrient Needs:  Energy (kcal):  MSJ;1.4x820=1148; kcal; Weight Used for Energy Requirements:  Current     Protein (g):  65-80 g; Weight Used for Protein Requirements:  Current(1.5-1.8 g/kg CBW)        Fluid (ml/day):   ml; Method Used for Fluid Requirements:  1 ml/kcal      Nutrition Related Findings:  A/Ox2, dentures, abd distention, +BS, no noted edema, +I/O's      Wounds:  Deep Tissue Injury(buttocks)       Current Nutrition Therapies:    DIET DYSPHAGIA SOFT AND BITE-SIZED; Dysphagia Soft and Bite-Sized;  Moderately Thick (Honey)    Anthropometric Measures:  · Height: 5' 2\" (157.5 cm)  · Current Body Weight: 94 lb 2.2 oz (42.7 kg)(1/22)   · Admission Body Weight: 96 lb 5.5 oz (43.7 kg)(1/17 BS)    · Usual Body Weight: 96 lb (43.5 kg)(10/2019 Per EMR)     · Ideal Body Weight: 110 lbs; % Ideal Body Weight 85.6 %   · BMI: 17.2   · BMI Categories: Underweight (BMI less than 22) age over 72       Nutrition Diagnosis: · Increased nutrient needs related to increase demand for energy/nutrients as evidenced by wounds      Nutrition Interventions:   Food and/or Nutrient Delivery:  Continue Current Diet, Start Oral Nutrition Supplement  Nutrition Education/Counseling:  Education not indicated   Coordination of Nutrition Care:  Continue to monitor while inpatient    Goals:  Pt to consume > 50% of all meals/ONS       Nutrition Monitoring and Evaluation:   Behavioral-Environmental Outcomes:  None Identified   Food/Nutrient Intake Outcomes:  Food and Nutrient Intake, Supplement Intake  Physical Signs/Symptoms Outcomes:  Biochemical Data, Nutrition Focused Physical Findings, Skin, Weight, GI Status, Fluid Status or Edema     Discharge Planning:     Too soon to determine     Electronically signed by Marta Hazel RD, LD on 1/22/21 at 2:23 PM EST    Contact: [phone]

## 2021-01-22 NOTE — PROGRESS NOTES
Date: 1/22/2021    Time: 4:36 PM    Patient Placed On BIPAP/CPAP/ Non-Invasive Ventilation? No, found on BiPAP from previous shift     If no must comment. Facial area red/color change? No           If YES are Blister/Lesion present? No   If yes must notify nursing staff  BIPAP/CPAP skin barrier?   Yes    Skin barrier type:mepilexlite       Comments:  SpO2 : 98%       Jess Palco

## 2021-01-22 NOTE — PROGRESS NOTES
Pulmonary Progress Note  1/22/2021 10:56 AM  Subjective:   Admit Date: 1/15/2021  PCP: Lilli Chris MD  Interval History: shakes her head when asked how she's doing   Diet: DIET DYSPHAGIA SOFT AND BITE-SIZED; Dysphagia Soft and Bite-Sized; Moderately Thick (Honey)  SOB is: Moderate  Cough: None  Wheezing: None  chest pain: None    Data:   Scheduled Meds:    enoxaparin  40 mg Subcutaneous Daily    miconazole nitrate   Topical BID    insulin glargine  10 Units Subcutaneous Nightly    insulin lispro  0-12 Units Subcutaneous TID WC    insulin lispro  0-6 Units Subcutaneous Nightly    cefTRIAXone (ROCEPHIN) IV  1 g Intravenous Q24H    doxycycline (VIBRAMYCIN) IV  100 mg Intravenous Q12H    donepezil  20 mg Oral Nightly    fluticasone  1 spray Nasal Daily    prednisoLONE acetate  1 drop Both Eyes Daily    rosuvastatin  10 mg Oral Daily    sodium chloride flush  10 mL Intravenous 2 times per day    ipratropium-albuterol  1 ampule Inhalation Q4H WA    pantoprazole  40 mg Oral QAM AC       Continuous Infusions:    dextrose         PRN Meds: miconazole nitrate **AND** miconazole nitrate, sodium chloride flush, promethazine **OR** ondansetron, polyethylene glycol, acetaminophen **OR** acetaminophen, glucose, dextrose, glucagon (rDNA), dextrose    I/O last 3 completed shifts: In: 510 [P.O.:360; I.V.:50; IV Piggyback:100]  Out: -     No intake/output data recorded.       Intake/Output Summary (Last 24 hours) at 1/22/2021 1056  Last data filed at 1/22/2021 0634  Gross per 24 hour   Intake 270 ml   Output    Net 270 ml       Patient Vitals for the past 96 hrs (Last 3 readings):   Weight   01/22/21 0221 94 lb 3.2 oz (42.7 kg)   01/21/21 0437 97 lb 3.2 oz (44.1 kg)   01/20/21 0550 98 lb 1.6 oz (44.5 kg)         Recent Labs     01/20/21  0720 01/21/21  0648 01/22/21  0617   WBC 6.3 6.5 7.7   HGB 12.0 11.7 11.9   HCT 40.0 38.3 39.9   .1* 101.9* 102.8*   * 124* 128*     Recent Labs     01/20/21 0720 21  0648 21  0617    136 140   K 5.4* 5.2* 4.7   CL 96* 94* 94*   CO2 43* 38* 40*   BUN 25* 24* 23   CREATININE 0.7 0.7 0.8         -----------------------------------------------------------------  RAD:   Results for orders placed during the hospital encounter of 01/15/21   XR CHEST PORTABLE    Narrative EXAMINATION:  ONE XRAY VIEW OF THE CHEST  1/15/2021 10:57 am  COMPARISON:  2019  HISTORY:  ORDERING SYSTEM PROVIDED HISTORY: hypoxia  TECHNOLOGIST PROVIDED HISTORY:  Reason for exam:->hypoxia  What reading provider will be dictating this exam?->CRC  FINDINGS:  Significant bilateral parenchymal and interstitial fibrosis is unchanged. Small bilateral pleural effusions are suspected. Heart size is normal.  There is a moderate amount of aorto iliac arterial vascular plaque.     Impression No significant change       Micro:  Vent Information  Skin Assessment: Clean, dry, & intact  Vt Ordered: 400 mL  FiO2 : 40 %  SpO2: 100 %  SpO2/FiO2 ratio: 250  I Time/ I Time %: 0.9 s  Mask Type: Full face mask  Mask Size: Small    Additional Respiratory  Assessments  Pulse: 70  Resp: 18  SpO2: 100 %    Objective:   Vitals:   Vitals:    21 0800   BP:    Pulse:    Resp: 18   Temp:    SpO2: 100%      TEMP:Current: Temp: 97.5 °F (36.4 °C)  Max: Temp  Av.4 °F (36.9 °C)  Min: 97.5 °F (36.4 °C)  Max: 98.8 °F (37.1 °C)    BP Range: Systolic (53PYI), FCD:330 , Min:92 , DYQ:922     Diastolic (76DJR), PLP:32, Min:52, Max:58      General appearance: thin built, laying in bed with legs folded on BIPAP   alert, appears stated age and cooperative  In no acute distress  Skin: No rashes or lesions  HEENT: mucous membranes are moist  Neck: No JVD  Lungs: symmetrical expansion, clear to auscultation, no use of accessory muscles  Heart: S1S2 no murmurs,  Abdomen: soft, non tender,   Extremities: no peripheral edema  Neurologic: Alert, oriented times 3,  Affect: pleasant      Assessment: Patient Active Problem List:      80 y.o. female 55-pack-year current smoker on chronic O2 at 4 L, diabetes, hypothyroid, DM, with history of dysphagia family refusing PEG tube presents on 1/15 with syncope and hypoxemia with change in mental status. 12/29 was diagnosed with UTI given antibiotics as an outpatient saturations were 68% on room air  1/15 Covid negative. CT head negative  Chest x-ray fibrosis small bilateral effusions with  1/16 placed on AVAPS  1/17 on10 L saturating 93%  1/18 swallow study showing moderate dysphagia with honey consistency. Bumex drip DC'd  1/20 palliative care spoke to family. No decisions made  1/22  40% FiO2 satting 100% on BiPAP     1. Acute on chronic hypercapnic respiratory failure   2. chronic hypoxemic respiratory failure baseline 4 L  3. CHF with elevated proBNP on Bumex  4. Echo showing EF of 68% mod MR mod pulm HTN EF nl DD 1 10/2019  5. Dysphasia swallow study 10/16/2019 aspiration with nectar consistency, 1/18 showing honey consistency  6. Baseline creatinine 0.4   7. Chronic  hypercapnic respiratory failure baseline PCO2 54 PO2 73 pH 7.48 as baseline  8. Moderate obstructive lung disease last FEV1 0.9 L 65% predicted no improvement with bronchodilators on 4/17/2019  9. dementia with CT showing white small vessel ischemic disease  10. History of calcified lung nodules and mediastinal lymphadenopathy reflective of granulomatous disease  11. COPD not wheezing   12. History of smoking at home family was allowing her to  cigarettes  13. DM  14. Dementia with CT showing small vessel ischemic disease  15. Hypothyroid   16. Recent admissions:   · 9/99/11 2018 was admitted for elevated blood sugars procalcitonin was negative.  CT of the head showed no intracranial abnormalities  · 9/14/2019 R femur fracture s/p repair · 10/15/2019 -10/28/2019 @ [address] [place] [address] with change of mental status with desaturations.  Patient was found to have elevated troponin stress test was negative, Echo showing moderate MR moderate pulmonary hypertension EF 68%  · 10/29 -11/5/2019 [facility] chest x-ray severe emphysema with superimposed CHF was the official reading 10/29 but the patient has a right lower lobe infiltrate, CT chest 9/10/2018 showing severe emphysema with bilateral pleural thickening fibrosis with pleural scar calcified nodules consistent with granulomas and calcified mediastinal lymphadenopathy patient was treated for healthcare associated pneumonia. Patient failed her swallow study. Family declined PEG tube and signed a waiver allowing her to eat  · 11/611/12/2019 hypoxic respiratory failure due to fluid overload and aspiration pneumonia. Family chose to take her home with home health care      Plan:   1. Change to nasal cannula oxygen   2. Give breaks with BIPAP 4 hours on and off   3.  Finished course of Rocephin today day 7 DC doxycycline also     Maryann EstradaProvidence Sacred Heart Medical CenterP

## 2021-01-22 NOTE — PROGRESS NOTES
Progress Note  Date:2021       VBFD:[id number]  Patient Name:Noemi Dunham     YOB: 1934     Age:86 y.o. Patient resting. Subjective    Subjective:  Symptoms:  Stable. She reports shortness of breath. Review of Systems   Constitutional: Positive for activity change. Negative for fever. Respiratory: Positive for shortness of breath. Objective         Vitals Last 24 Hours:  TEMPERATURE:  Temp  Av.4 °F (36.9 °C)  Min: 97.6 °F (36.4 °C)  Max: 98.8 °F (37.1 °C)  RESPIRATIONS RANGE: Resp  Av.5  Min: 14  Max: 33  PULSE OXIMETRY RANGE: SpO2  Av.4 %  Min: 92 %  Max: 98 %  PULSE RANGE: Pulse  Av  Min: 67  Max: 88  BLOOD PRESSURE RANGE: Systolic (29PSP), CSX:571 , Min:92 , QCP:934   ; Diastolic (23WGM), VCL:59, Min:52, Max:58    I/O (24Hr): Intake/Output Summary (Last 24 hours) at 2021 0714  Last data filed at 2021 0634  Gross per 24 hour   Intake 510 ml   Output    Net 510 ml     Objective:  General Appearance:  Comfortable. Vital signs: (most recent): Blood pressure (!) 101/53, pulse 70, temperature 98.8 °F (37.1 °C), temperature source Temporal, resp. rate 23, height 5' 2\" (1.575 m), weight 94 lb 3.2 oz (42.7 kg), SpO2 95 %. No fever. Lungs:  Normal effort. There are decreased breath sounds. Heart: Normal rate. Regular rhythm. S1 normal and S2 normal.      Labs/Imaging/Diagnostics    Labs:  CBC:  Recent Labs     21  0720 21  0648 21  0617   WBC 6.3 6.5 7.7   RBC 3.88 3.76 3.88   HGB 12.0 11.7 11.9   HCT 40.0 38.3 39.9   .1* 101.9* 102.8*   RDW 13.5 13.3 13.6   * 124* 128*     CHEMISTRIES:  Recent Labs     21  0720 21  0648    136   K 5.4* 5.2*   CL 96* 94*   CO2 43* 38*   BUN 25* 24*   CREATININE 0.7 0.7   GLUCOSE 85 131*     PT/INR:  No results for input(s): PROTIME, INR in the last 72 hours. APTT:  No results for input(s): APTT in the last 72 hours.   LIVER PROFILE: No results for input(s): AST, ALT, BILIDIR, BILITOT, ALKPHOS in the last 72 hours. Imaging Last 24 Hours:  Ct Head Wo Contrast    Result Date: 1/15/2021  EXAMINATION: CT OF THE HEAD WITHOUT CONTRAST  1/15/2021 12:26 pm TECHNIQUE: CT of the head was performed without the administration of intravenous contrast. Dose modulation, iterative reconstruction, and/or weight based adjustment of the mA/kV was utilized to reduce the radiation dose to as low as reasonably achievable. COMPARISON: 09/11/2018 HISTORY: ORDERING SYSTEM PROVIDED HISTORY: AMS TECHNOLOGIST PROVIDED HISTORY: Reason for exam:->AMS Has a \"code stroke\" or \"stroke alert\" been called? ->No What reading provider will be dictating this exam?->CRC FINDINGS: BRAIN/VENTRICLES: There is no acute intracranial hemorrhage, mass effect or midline shift. No abnormal extra-axial fluid collection. The gray-white differentiation is maintained without evidence of an acute infarct. There is no evidence of hydrocephalus. The ventricles, cisterns and sulci are prominent consistent with atrophy. There is decreased attenuation within the periventricular white matter consistent with periventricular leukomalacia. ORBITS: The visualized portion of the orbits demonstrate no acute abnormality. SINUSES: The visualized paranasal sinuses and mastoid air cells demonstrate no acute abnormality. SOFT TISSUES/SKULL:  No acute abnormality of the visualized skull or soft tissues. 1. There is no acute intracranial abnormality. Specifically, there is no intracranial hemorrhage.  2. Atrophy and periventricular leukomalacia,    Xr Chest Portable    Result Date: 1/15/2021

## 2021-01-22 NOTE — PROGRESS NOTES
Palliative Care Department  [phone]  Palliative Care Progress Note  Provider Yudi Brown TERE CNP    [facility]  [id number]  Hospital Day: 8  Date of Initial Consult: 1/19/2021  Referring Provider: Rosalinda Boxer, MD  Palliative Medicine was consulted for assistance with: Goals of Care, Code Status Discussion, Family Support    HPI:   Timi Kayden Uri is a 80 y.o. with a medical history of COPD on 4L 02, DM, dementia who was admitted on 1/15/2021 from home with a CHIEF COMPLAINT of syncope and hypoxia. Per chart review, patient's daughter reported that patient had been intermittently confused for couple weeks. Patient was recently diagnosed with a UTI and was given antibiotics. Upon presentation to ED, patient was found to be in respiratory distress with oxygen saturation 85% on 5 L nasal cannula. ABGs revealed that patient was hypercapnic and patient was placed on BiPAP. Patient admitted to telemetry for further work-up and management. ASSESSMENT/PLAN:     Pertinent Hospital Diagnoses     ? Acute on chronic respiratory failure  ? Bilateral pleural effusions  ? Dysphagia  ? Hx COPD  ? Hx Dementia  ? Hx DM        Palliative Care Encounter / Counseling Regarding Goals of Care  Please see detailed goals of care discussion as below  ? At this time, [address], Does Not have capacity for medical decision-making. Capacity is time limited and situation/question specific  ? Outcome of goals of care meeting: Continue current plan  ? Code status Full Code  ? Advanced Directives:  Living will in [facility]  ?  Surrogate/Legal NOK:  Keaton Clark, child/POA, [phone]  Christine Lawson, child, [phone]        Spiritual assessment: no spiritual distress identified  Bereavement and grief: to be determined  Referrals to: none today  SUBJECTIVE:     Current medical issues leading to Palliative Medicine involvement include   Active Hospital Problems    Diagnosis Date Noted  Acute hypercapnic respiratory failure ([facility] [place] [address] [J96.02] 01/15/2021       Details of Conversation: Chart reviewed. Patient currently on BiPAP. Sae Chang, no answer and message left. Spoke to Royal manley, daughter. Royal manley states that family wishes to continue with full code and current management. Royal manley states family wants patient to d/c to [facility]. [place] requesting information for other palliative medicine outpatient services in which palliative medicine would provide in home care and assist with bathing and ADLs. Palliative medicine social work to reach out to Royal manley to provide these options. Support provided and questions answered. Will follow. OBJECTIVE:   Prognosis: unknown    Physical Exam:  BP (!) 103/58   Pulse 70   Temp 97.5 °F (36.4 °C) (Temporal)   Resp 18   Ht 5' 2\" (1.575 m)   Wt 94 lb 3.2 oz (42.7 kg)   SpO2 100%   BMI 17.23 kg/m²   Constitutional:  Elderly, thin, NAD, awake, alert  Eyes: No scleral icterus, normal lids, no discharge  ENMT:  Normocephalic, atraumatic, mucosa moist, EOMI  Neck:  Trachea midline, no JVD  Lungs: BiPAP  Heart:  RRR   Abd:  Soft, non distended  Ext:  Moving all extremities, no edema, pulses present  Skin:  Warm and dry  Neuro:  Alert     Objective data reviewed: labs, images, records, medication use, vitals and chart    Discussed patient and the plan of care with the other IDT members: Palliative Medicine IDT Team    Time/Communication  Greater than 50% of time spent, total 15 minutes in counseling and coordination of care at the bedside regarding goals of care, diagnosis and prognosis and see above. Thank you for allowing Palliative Medicine to participate in the care of [employer].

## 2021-01-23 NOTE — PROGRESS NOTES
Pt began to desat in the 70's shortly after lunch. Switched from HFNC at 10L to bipap and increased fiO2 to 50%. NP came in and ordered stat CXR. Will follow up with results. Current spo2 98% on biapa 14/6 at 50%.

## 2021-01-23 NOTE — PROGRESS NOTES
Progress Note  Date:2021       [facility]:[id number]  Patient Name:Noemi Dunham     YOB: 1934     Age:86 y.o. Patient resting. Subjective    Subjective:  Symptoms:  Stable. She reports shortness of breath. Review of Systems   Constitutional: Positive for activity change. Negative for fever. Respiratory: Positive for shortness of breath. Objective         Vitals Last 24 Hours:  TEMPERATURE:  Temp  Av.8 °F (36.6 °C)  Min: 97.2 °F (36.2 °C)  Max: 98.2 °F (36.8 °C)  RESPIRATIONS RANGE: Resp  Av.2  Min: 18  Max: 25  PULSE OXIMETRY RANGE: SpO2  Av.4 %  Min: 90 %  Max: 100 %  PULSE RANGE: Pulse  Av.2  Min: 65  Max: 77  BLOOD PRESSURE RANGE: Systolic (05EIN), WUC:826 , Min:96 , PXB:524   ; Diastolic (35QDM), MDK:34, Min:50, Max:62    I/O (24Hr): Intake/Output Summary (Last 24 hours) at 2021 0719  Last data filed at 2021 2130  Gross per 24 hour   Intake 130 ml   Output 0 ml   Net 130 ml     Objective:  General Appearance:  Comfortable. Vital signs: (most recent): Blood pressure (!) 96/50, pulse 65, temperature 97.2 °F (36.2 °C), temperature source Temporal, resp. rate 18, height 5' 2\" (1.575 m), weight 92 lb 3.2 oz (41.8 kg), SpO2 100 %. No fever. Lungs:  Normal effort. There are decreased breath sounds. Heart: Normal rate. Regular rhythm. S1 normal and S2 normal.      Labs/Imaging/Diagnostics    Labs:  CBC:  Recent Labs     21  0648 21  0617 21  0603   WBC 6.5 7.7 7.3   RBC 3.76 3.88 4.25   HGB 11.7 11.9 13.2   HCT 38.3 39.9 44.2   .9* 102.8* 104.0*   RDW 13.3 13.6 13.5   * 128* 119*     CHEMISTRIES:  Recent Labs     21  0648 21  0617 21  0603    140 140   K 5.2* 4.7 4.7   CL 94* 94* 96*   CO2 38* 40* 40*   BUN 24* 23 22   CREATININE 0.7 0.8 0.7   GLUCOSE 131* 92 139*     PT/INR:  No results for input(s): PROTIME, INR in the last 72 hours.   APTT: No results for input(s): APTT in the last 72 hours. LIVER PROFILE:  No results for input(s): AST, ALT, BILIDIR, BILITOT, ALKPHOS in the last 72 hours. Imaging Last 24 Hours:  Ct Head Wo Contrast    Result Date: 1/15/2021  EXAMINATION: CT OF THE HEAD WITHOUT CONTRAST  1/15/2021 12:26 pm TECHNIQUE: CT of the head was performed without the administration of intravenous contrast. Dose modulation, iterative reconstruction, and/or weight based adjustment of the mA/kV was utilized to reduce the radiation dose to as low as reasonably achievable. COMPARISON: 09/11/2018 HISTORY: ORDERING SYSTEM PROVIDED HISTORY: AMS TECHNOLOGIST PROVIDED HISTORY: Reason for exam:->AMS Has a \"code stroke\" or \"stroke alert\" been called? ->No What reading provider will be dictating this exam?->CRC FINDINGS: BRAIN/VENTRICLES: There is no acute intracranial hemorrhage, mass effect or midline shift. No abnormal extra-axial fluid collection. The gray-white differentiation is maintained without evidence of an acute infarct. There is no evidence of hydrocephalus. The ventricles, cisterns and sulci are prominent consistent with atrophy. There is decreased attenuation within the periventricular white matter consistent with periventricular leukomalacia. ORBITS: The visualized portion of the orbits demonstrate no acute abnormality. SINUSES: The visualized paranasal sinuses and mastoid air cells demonstrate no acute abnormality. SOFT TISSUES/SKULL:  No acute abnormality of the visualized skull or soft tissues. 1. There is no acute intracranial abnormality. Specifically, there is no intracranial hemorrhage.  2. Atrophy and periventricular leukomalacia,    Xr Chest Portable    Result Date: 1/15/2021 EXAMINATION: ONE XRAY VIEW OF THE CHEST 1/15/2021 10:57 am COMPARISON: 11/08/2019 HISTORY: ORDERING SYSTEM PROVIDED HISTORY: hypoxia TECHNOLOGIST PROVIDED HISTORY: Reason for exam:->hypoxia What reading provider will be dictating this exam?->CRC FINDINGS: Significant bilateral parenchymal and interstitial fibrosis is unchanged. Small bilateral pleural effusions are suspected. Heart size is normal. There is a moderate amount of aorto iliac arterial vascular plaque.     No significant change    Assessment//Plan           Hospital Problems           Last Modified POA    Acute hypercapnic respiratory failure ([address] 1/15/2021 Yes        Assessment & Plan   Acute hypoxic respiratory failure  pleural effusion  DM  Dementia    Pulmonary following  Bipap  Wean as tolerateed  Continue rest of meds  Palliative care  Possible LTAC

## 2021-01-23 NOTE — PROGRESS NOTES
Pulmonary Progress Note  1/23/2021 2:34 PM  Subjective:   Admit Date: 1/15/2021  PCP: Joslyn Gunter MD  Interval History: RN at bedside switched patient to Bipap as she was desating 70% on 10L after eating lunch, patient is in no acute distress. Diet: DIET DYSPHAGIA SOFT AND BITE-SIZED; Dysphagia Soft and Bite-Sized; Moderately Thick (Honey)  Dietary Nutrition Supplements: Other Oral Supplement (see comment)  SOB is: Moderate  Cough: None  Wheezing: None  chest pain: None    Data:   Scheduled Meds:    enoxaparin  40 mg Subcutaneous Daily    miconazole nitrate   Topical BID    insulin glargine  10 Units Subcutaneous Nightly    insulin lispro  0-12 Units Subcutaneous TID WC    insulin lispro  0-6 Units Subcutaneous Nightly    donepezil  20 mg Oral Nightly    fluticasone  1 spray Nasal Daily    prednisoLONE acetate  1 drop Both Eyes Daily    rosuvastatin  10 mg Oral Daily    sodium chloride flush  10 mL Intravenous 2 times per day    ipratropium-albuterol  1 ampule Inhalation Q4H WA    pantoprazole  40 mg Oral QAM AC       Continuous Infusions:    dextrose         PRN Meds: miconazole nitrate **AND** miconazole nitrate, sodium chloride flush, promethazine **OR** ondansetron, polyethylene glycol, acetaminophen **OR** acetaminophen, glucose, dextrose, glucagon (rDNA), dextrose    I/O last 3 completed shifts: In: 130 [P.O.:120; I.V.:10]  Out: 0     No intake/output data recorded.       Intake/Output Summary (Last 24 hours) at 1/23/2021 1434  Last data filed at 1/23/2021 1339  Gross per 24 hour   Intake 10 ml   Output 0 ml   Net 10 ml       Patient Vitals for the past 96 hrs (Last 3 readings):   Weight   01/23/21 0558 92 lb 3.2 oz (41.8 kg)   01/22/21 0221 94 lb 3.2 oz (42.7 kg)   01/21/21 0437 97 lb 3.2 oz (44.1 kg)         Recent Labs     01/21/21  0648 01/22/21  0617 01/23/21  0603   WBC 6.5 7.7 7.3   HGB 11.7 11.9 13.2   HCT 38.3 39.9 44.2   .9* 102.8* 104.0*   * 128* 119* Recent Labs     01/21/21  0648 01/22/21  0617 01/23/21  0603    140 140   K 5.2* 4.7 4.7   CL 94* 94* 96*   CO2 38* 40* 40*   BUN 24* 23 22   CREATININE 0.7 0.8 0.7         -----------------------------------------------------------------  RAD:   Results for orders placed during the hospital encounter of 01/15/21   XR CHEST PORTABLE    Narrative EXAMINATION:  ONE XRAY VIEW OF THE CHEST  1/15/2021 10:57 am  COMPARISON:  11/08/2019  HISTORY:  ORDERING SYSTEM PROVIDED HISTORY: hypoxia  TECHNOLOGIST PROVIDED HISTORY:  Reason for exam:->hypoxia  What reading provider will be dictating this exam?->CRC  FINDINGS:  Significant bilateral parenchymal and interstitial fibrosis is unchanged. Small bilateral pleural effusions are suspected. Heart size is normal.  There is a moderate amount of aorto iliac arterial vascular plaque. Impression No significant change       Micro:  Results for Syrinix (MRN [id number]) as of 1/23/2021 14:35   Ref.  Range 1/15/2021 11:09   Influenza A by PCR Latest Ref Range: Not Detected  Not Detected   Influenza B by PCR Latest Ref Range: Not Detected  Not Detected   Adenovirus by PCR Latest Ref Range: Not Detected  Not Detected   Coronavirus 229E by PCR Latest Ref Range: Not Detected  Not Detected   Coronavirus HKU1 by PCR Latest Ref Range: Not Detected  Not Detected   Coronavirus NL63 by PCR Latest Ref Range: Not Detected  Not Detected   Coronavirus OC43 by PCR Latest Ref Range: Not Detected  Not Detected   Human Metapneumovirus by PCR Latest Ref Range: Not Detected  Not Detected   Human Rhinovirus/Enterovirus by PCR Latest Ref Range: Not Detected  Not Detected   Parainfluenza Virus 1 by PCR Latest Ref Range: Not Detected  Not Detected   Parainfluenza Virus 2 by PCR Latest Ref Range: Not Detected  Not Detected   Parainfluenza Virus 3 by PCR Latest Ref Range: Not Detected  Not Detected   Parainfluenza Virus 4 by PCR Latest Ref Range: Not Detected  Not Detected Respiratory Syncytial Virus by PCR Latest Ref Range: Not Detected  Not Detected   Bordetella parapertussis by PCR Latest Ref Range: Not Detected  Not Detected   Chlamydophilia pneumoniae by PCR Latest Ref Range: Not Detected  Not Detected   Mycoplasma pneumoniae by PCR Latest Ref Range: Not Detected  Not Detected   SARS-CoV-2, PCR Latest Ref Range: Not Detected  Not Detected   Bordetella pertussis by PCR Latest Ref Range: Not Detected  Not Detected             Vent Information  Skin Assessment: Clean, dry, & intact  Equipment Changed: Humidification  Vt Ordered: 400 mL  FiO2 : 40 %  SpO2: 100 %  SpO2/FiO2 ratio: 247.5  I Time/ I Time %: 0.9 s  Mask Type: Full face mask  Mask Size: Small    Additional Respiratory  Assessments  Pulse: 69  Resp: 16  SpO2: 100 %    Objective:   Vitals:   Vitals:    21 0800   BP: (!) 105/51   Pulse: 69   Resp: 16   Temp: 97.3 °F (36.3 °C)   SpO2: 100%      TEMP:Current: Temp: 97.3 °F (36.3 °C)  Max: Temp  Av.7 °F (36.5 °C)  Min: 97.2 °F (36.2 °C)  Max: 98.2 °F (36.8 °C)    BP Range: Systolic (92UBN), NATALIA:062 , Min:96 , MAS:575     Diastolic (55USS), AJITH:81, Min:50, Max:62      General appearance: thin built,  appears stated age and cooperative  In no acute distress  Skin: No rashes or lesions  HEENT: mucous membranes are moist  Neck: No JVD  Lungs: symmetrical expansion, coarse rales/rhonchi throughout  Mildly labored, no acute distress now on bipap 50% Fio2  Heart: S1S2 no murmurs,  Abdomen: soft, non tender, BS+  Extremities: no peripheral edema  Neurologic: Alert, oriented times 3, KING  Affect: calm      Assessment:   Patient Active Problem List:      80 y.o. female 55-pack-year current smoker on chronic O2 at 4 L, diabetes, hypothyroid, DM, with history of dysphagia family refusing PEG tube presents on 1/15 with syncope and hypoxemia with change in mental status.    was diagnosed with UTI given antibiotics as an outpatient saturations were 68% on room air 1/15 Covid negative. CT head negative  Chest x-ray fibrosis small bilateral effusions with  1/16 placed on AVAPS  1/17 on10 L saturating 93%  1/18 swallow study showing moderate dysphagia with honey consistency. Bumex drip DC'd  1/20 palliative care spoke to family. No decisions made  1/22  40% FiO2 satting 100% on BiPAP  1/23 10L then desaturated to 70% after lunch, now on bipap     1. Acute on chronic hypercapnic respiratory failure   2. chronic hypoxemic respiratory failure baseline 4 L  3. CHF with elevated proBNP on Bumex  4. Echo showing EF of 68% mod MR mod pulm HTN EF nl DD 1 10/2019  5. Dysphasia swallow study 10/16/2019 aspiration with nectar consistency, 1/18 showing honey consistency  6. Baseline creatinine 0.4   7. Chronic  hypercapnic respiratory failure baseline PCO2 54 PO2 73 pH 7.48 as baseline  8. Moderate obstructive lung disease last FEV1 0.9 L 65% predicted no improvement with bronchodilators on 4/17/2019  9. dementia with CT showing white small vessel ischemic disease  10. History of calcified lung nodules and mediastinal lymphadenopathy reflective of granulomatous disease  11. COPD not wheezing   12. History of smoking at home family was allowing her to  cigarettes  13. DM  14. Dementia with CT showing small vessel ischemic disease  15. Hypothyroid   16. Recent admissions:   · 9/99/11 2018 was admitted for elevated blood sugars procalcitonin was negative.  CT of the head showed no intracranial abnormalities  · 9/14/2019 R femur fracture s/p repair   · 10/15/2019 -10/28/2019 @ [address] [place] [address] with change of mental status with desaturations.  Patient was found to have elevated troponin stress test was negative, Echo showing moderate MR moderate pulmonary hypertension EF 68% · 10/29 -11/5/2019 @[facility] chest x-ray severe emphysema with superimposed CHF was the official reading 10/29 but the patient has a right lower lobe infiltrate, CT chest 9/10/2018 showing severe emphysema with bilateral pleural thickening fibrosis with pleural scar calcified nodules consistent with granulomas and calcified mediastinal lymphadenopathy patient was treated for healthcare associated pneumonia. Patient failed her swallow study. Family declined PEG tube and signed a waiver allowing her to eat  · 11/611/12/2019 hypoxic respiratory failure due to fluid overload and aspiration pneumonia. Family chose to take her home with home health care      Plan:   1. Continue O2, was on 10LHF this am, desaturated to 70% after lunch, now on Bipap RN increased Fio2 to 50% from 40% still took 10 minutes to recover to 91%, titrate to keep POX>92%  2. Get a stat Cxr, failed MBS family is refusing peg tube, most likely is aspirating  3. Finished course of Rocephin and doxy yesterday   4. Check abg  5.  Will need to address feeding status if continuing to aspirate     Andrews VILLAS-BC

## 2021-01-24 NOTE — PROGRESS NOTES
Pulmonary Progress Note  1/24/2021 12:19 PM  Subjective:   Admit Date: 1/15/2021  PCP: Nael Nuñez MD  Interval History: resting in bed on 10LHF> looks less distressed today. States she does not feel much better. Diet: DIET DYSPHAGIA SOFT AND BITE-SIZED; Dysphagia Soft and Bite-Sized; Moderately Thick (Honey)  Dietary Nutrition Supplements: Other Oral Supplement (see comment)  SOB is: Moderate  Cough: None  Wheezing: None  chest pain: None    Data:   Scheduled Meds:    enoxaparin  40 mg Subcutaneous Daily    miconazole nitrate   Topical BID    insulin glargine  10 Units Subcutaneous Nightly    insulin lispro  0-12 Units Subcutaneous TID WC    insulin lispro  0-6 Units Subcutaneous Nightly    donepezil  20 mg Oral Nightly    fluticasone  1 spray Nasal Daily    prednisoLONE acetate  1 drop Both Eyes Daily    rosuvastatin  10 mg Oral Daily    sodium chloride flush  10 mL Intravenous 2 times per day    ipratropium-albuterol  1 ampule Inhalation Q4H WA    pantoprazole  40 mg Oral QAM AC       Continuous Infusions:    dextrose         PRN Meds: miconazole nitrate **AND** miconazole nitrate, sodium chloride flush, promethazine **OR** ondansetron, polyethylene glycol, acetaminophen **OR** acetaminophen, glucose, dextrose, glucagon (rDNA), dextrose    I/O last 3 completed shifts: In: 132.5 [P.O.:120;  I.V.:12.5]  Out: 0     I/O this shift:  In: 240 [P.O.:240]  Out: -       Intake/Output Summary (Last 24 hours) at 1/24/2021 1219  Last data filed at 1/24/2021 3785  Gross per 24 hour   Intake 372.5 ml   Output 0 ml   Net 372.5 ml       Patient Vitals for the past 96 hrs (Last 3 readings):   Weight   01/24/21 0500 97 lb (44 kg)   01/23/21 0558 92 lb 3.2 oz (41.8 kg)   01/22/21 0221 94 lb 3.2 oz (42.7 kg)         Recent Labs     01/22/21  0617 01/23/21  0603 01/24/21  0543   WBC 7.7 7.3 6.0   HGB 11.9 13.2 12.2   HCT 39.9 44.2 40.9   .8* 104.0* 103.8*   * 119* 111*     Recent Labs 01/22/21  0617 01/23/21  0603 01/24/21  0543    140 141   K 4.7 4.7 4.5   CL 94* 96* 96*   CO2 40* 40* 40*   BUN 23 22 20   CREATININE 0.8 0.7 0.6         -----------------------------------------------------------------  RAD:   Results for orders placed during the hospital encounter of 01/15/21   XR CHEST PORTABLE  1/23:    Extensive bilateral parenchymal and interstitial fibrosis and moderate sized   bilateral effusions are unchanged.  Heart size is normal.                 Narrative EXAMINATION:  ONE XRAY VIEW OF THE CHEST  1/15/2021 10:57 am  COMPARISON:  11/08/2019  HISTORY:  ORDERING SYSTEM PROVIDED HISTORY: hypoxia  TECHNOLOGIST PROVIDED HISTORY:  Reason for exam:->hypoxia  What reading provider will be dictating this exam?->CRC  FINDINGS:  Significant bilateral parenchymal and interstitial fibrosis is unchanged. Small bilateral pleural effusions are suspected. Heart size is normal.  There is a moderate amount of aorto iliac arterial vascular plaque. Impression No significant change   1/23                                                                               1/15    Micro:  Results for Ollie Ross (MRN [id number]) as of 1/23/2021 14:35   Ref.  Range 1/15/2021 11:09   Influenza A by PCR Latest Ref Range: Not Detected  Not Detected   Influenza B by PCR Latest Ref Range: Not Detected  Not Detected   Adenovirus by PCR Latest Ref Range: Not Detected  Not Detected   Coronavirus 229E by PCR Latest Ref Range: Not Detected  Not Detected   Coronavirus HKU1 by PCR Latest Ref Range: Not Detected  Not Detected   Coronavirus NL63 by PCR Latest Ref Range: Not Detected  Not Detected   Coronavirus OC43 by PCR Latest Ref Range: Not Detected  Not Detected   Human Metapneumovirus by PCR Latest Ref Range: Not Detected  Not Detected   Human Rhinovirus/Enterovirus by PCR Latest Ref Range: Not Detected  Not Detected   Parainfluenza Virus 1 by PCR Latest Ref Range: Not Detected  Not Detected Parainfluenza Virus 2 by PCR Latest Ref Range: Not Detected  Not Detected   Parainfluenza Virus 3 by PCR Latest Ref Range: Not Detected  Not Detected   Parainfluenza Virus 4 by PCR Latest Ref Range: Not Detected  Not Detected   Respiratory Syncytial Virus by PCR Latest Ref Range: Not Detected  Not Detected   Bordetella parapertussis by PCR Latest Ref Range: Not Detected  Not Detected   Chlamydophilia pneumoniae by PCR Latest Ref Range: Not Detected  Not Detected   Mycoplasma pneumoniae by PCR Latest Ref Range: Not Detected  Not Detected   SARS-CoV-2, PCR Latest Ref Range: Not Detected  Not Detected   Bordetella pertussis by PCR Latest Ref Range: Not Detected  Not Detected             Vent Information  Skin Assessment: Clean, dry, & intact  Equipment Changed: Humidification  Vt Ordered: 400 mL  FiO2 : 40 %  SpO2: 99 %  SpO2/FiO2 ratio: 247.5  I Time/ I Time %: 0.9 s  Mask Type: Full face mask  Mask Size: Small    Additional Respiratory  Assessments  Pulse: 65  Resp: 20  SpO2: 99 %    Objective:   Vitals:   Vitals:    21 1214   BP:    Pulse:    Resp: 20   Temp:    SpO2:       TEMP:Current: Temp: 96.8 °F (36 °C)  Max: Temp  Av.2 °F (36.8 °C)  Min: 96.8 °F (36 °C)  Max: 99.5 °F (37.5 °C)    BP Range: Systolic (12JFK), SDP:155 , Min:104 , MBQ:201     Diastolic (58YLQ), MFJ:98, Min:51, Max:56      General appearance: thin built,  appears stated age and cooperative  In no acute distress  Skin: No rashes or lesions  HEENT: mucous membranes are moist  Neck: No JVD  Lungs: symmetrical expansion, coarse rales/rhonchi throughout  Mildly labored, no acute distress now on bipap 50% Fio2  Heart: S1S2 no murmurs,  Abdomen: soft, non tender, BS+  Extremities: no peripheral edema  Neurologic: Alert, oriented times 3, KING  Affect: calm      Assessment:   Patient Active Problem List: · 10/15/2019 -10/28/2019 @ [facility] with change of mental status with desaturations.  Patient was found to have elevated troponin stress test was negative, Echo showing moderate MR moderate pulmonary hypertension EF 68%  · 10/29 -11/5/2019 @[facility] chest x-ray severe emphysema with superimposed CHF was the official reading 10/29 but the patient has a right lower lobe infiltrate, CT chest 9/10/2018 showing severe emphysema with bilateral pleural thickening fibrosis with pleural scar calcified nodules consistent with granulomas and calcified mediastinal lymphadenopathy patient was treated for healthcare associated pneumonia. Patient failed her swallow study. Family declined PEG tube and signed a waiver allowing her to eat  · 11/611/12/2019 hypoxic respiratory failure due to fluid overload and aspiration pneumonia. Family chose to take her home with home health care      Plan:   1. Continue O2,  on 10LHF this am, coughs and becomes winded after meals. ABG obtained yesterday still with hypercapnea, recommend Bipap 4 hours on 4 hours off for now   2. Cxr repeated 1/23--no change , failed MBS family is refusing peg tube, most likely is aspirating, extensive fibrosis   3. Finished course of Rocephin and doxy 1/22   4. Will need to address feeding status if continuing to aspirate   5.  IS for pulmonary hygiene     [place] [facility]

## 2021-01-24 NOTE — PROGRESS NOTES
Progress Note  Date:2021       DIFI:[id number]  Patient Name:Noemi Dunham     YOB: 1934     Age:86 y.o. Patient resting. Subjective    Subjective:  Symptoms:  Stable. She reports shortness of breath. Review of Systems   Constitutional: Positive for activity change. Negative for fever. Respiratory: Positive for shortness of breath. Objective         Vitals Last 24 Hours:  TEMPERATURE:  Temp  Av.4 °F (36.9 °C)  Min: 97.3 °F (36.3 °C)  Max: 99.5 °F (37.5 °C)  RESPIRATIONS RANGE: Resp  Av.2  Min: 16  Max: 22  PULSE OXIMETRY RANGE: SpO2  Av.3 %  Min: 96 %  Max: 100 %  PULSE RANGE: Pulse  Av.5  Min: 69  Max: 74  BLOOD PRESSURE RANGE: Systolic (73KRJ), UAX:963 , Min:104 , NDC:409   ; Diastolic (43WEJ), SVX:71, Min:51, Max:51    I/O (24Hr): Intake/Output Summary (Last 24 hours) at 2021 0728  Last data filed at 2021 0727  Gross per 24 hour   Intake 372.5 ml   Output 0 ml   Net 372.5 ml     Objective:  General Appearance:  Comfortable. Vital signs: (most recent): Blood pressure (!) 104/51, pulse 74, temperature 99.5 °F (37.5 °C), temperature source Temporal, resp. rate 17, height 5' 2\" (1.575 m), weight 97 lb (44 kg), SpO2 98 %. No fever. Lungs:  Normal effort. There are decreased breath sounds. Heart: Normal rate. Regular rhythm. S1 normal and S2 normal.      Labs/Imaging/Diagnostics    Labs:  CBC:  Recent Labs     21  0617 21  0603 21  0543   WBC 7.7 7.3 6.0   RBC 3.88 4.25 3.94   HGB 11.9 13.2 12.2   HCT 39.9 44.2 40.9   .8* 104.0* 103.8*   RDW 13.6 13.5 13.4   * 119* 111*     CHEMISTRIES:  Recent Labs     21  0617 21  0603    140   K 4.7 4.7   CL 94* 96*   CO2 40* 40*   BUN 23 22   CREATININE 0.8 0.7   GLUCOSE 92 139*     PT/INR:  No results for input(s): PROTIME, INR in the last 72 hours. APTT:  No results for input(s): APTT in the last 72 hours.   LIVER PROFILE: No results for input(s): AST, ALT, BILIDIR, BILITOT, ALKPHOS in the last 72 hours. Imaging Last 24 Hours:  Ct Head Wo Contrast    Result Date: 1/15/2021  EXAMINATION: CT OF THE HEAD WITHOUT CONTRAST  1/15/2021 12:26 pm TECHNIQUE: CT of the head was performed without the administration of intravenous contrast. Dose modulation, iterative reconstruction, and/or weight based adjustment of the mA/kV was utilized to reduce the radiation dose to as low as reasonably achievable. COMPARISON: 09/11/2018 HISTORY: ORDERING SYSTEM PROVIDED HISTORY: AMS TECHNOLOGIST PROVIDED HISTORY: Reason for exam:->AMS Has a \"code stroke\" or \"stroke alert\" been called? ->No What reading provider will be dictating this exam?->CRC FINDINGS: BRAIN/VENTRICLES: There is no acute intracranial hemorrhage, mass effect or midline shift. No abnormal extra-axial fluid collection. The gray-white differentiation is maintained without evidence of an acute infarct. There is no evidence of hydrocephalus. The ventricles, cisterns and sulci are prominent consistent with atrophy. There is decreased attenuation within the periventricular white matter consistent with periventricular leukomalacia. ORBITS: The visualized portion of the orbits demonstrate no acute abnormality. SINUSES: The visualized paranasal sinuses and mastoid air cells demonstrate no acute abnormality. SOFT TISSUES/SKULL:  No acute abnormality of the visualized skull or soft tissues. 1. There is no acute intracranial abnormality. Specifically, there is no intracranial hemorrhage.  2. Atrophy and periventricular leukomalacia,    Xr Chest Portable    Result Date: 1/15/2021 EXAMINATION: ONE XRAY VIEW OF THE CHEST 1/15/2021 10:57 am COMPARISON: 11/08/2019 HISTORY: ORDERING SYSTEM PROVIDED HISTORY: hypoxia TECHNOLOGIST PROVIDED HISTORY: Reason for exam:->hypoxia What reading provider will be dictating this exam?->CRC FINDINGS: Significant bilateral parenchymal and interstitial fibrosis is unchanged. Small bilateral pleural effusions are suspected. Heart size is normal. There is a moderate amount of aorto iliac arterial vascular plaque.     No significant change    Assessment//Plan           Hospital Problems           Last Modified POA    Acute hypercapnic respiratory failure ([address] 1/15/2021 Yes        Assessment & Plan   Acute hypoxic respiratory failure  pleural effusion  DM  Dementia    Pulmonary following  Bipap  Wean as tolerateed  Continue rest of meds  Palliative care  Possible LTAC

## 2021-01-24 NOTE — PROGRESS NOTES
Date: 1/23/2021    Time: 9:58 PM    Patient Placed On BIPAP/CPAP/ Non-Invasive Ventilation? Yes    If no must comment. Facial area red/color change? No           If YES are Blister/Lesion present? No   If yes must notify nursing staff  BIPAP/CPAP skin barrier?   Yes    Skin barrier type:mepilexlite       Comments:        Nilo Crowley

## 2021-01-24 NOTE — PROGRESS NOTES
Date: 1/24/2021    Time: 2:19 AM    Patient Placed On BIPAP/CPAP/ Non-Invasive Ventilation? Patient continues on bipap    If no must comment. Facial area red/color change? No           If YES are Blister/Lesion present? No   If yes must notify nursing staff  BIPAP/CPAP skin barrier?   yes    Skin barrier type:mepilexlite       Comments:        Frutoso Inch, RRT

## 2021-01-25 PROBLEM — E43 SEVERE PROTEIN-CALORIE MALNUTRITION (HCC): Chronic | Status: ACTIVE | Noted: 2021-01-01

## 2021-01-25 NOTE — PLAN OF CARE
Problem: Skin Integrity:  Goal: Will show no infection signs and symptoms  Description: Will show no infection signs and symptoms  Outcome: Met This Shift     Problem: Skin Integrity:  Goal: Absence of new skin breakdown  Description: Absence of new skin breakdown  Outcome: Met This Shift     Problem: Pain:  Goal: Pain level will decrease  Description: Pain level will decrease  Outcome: Met This Shift     Problem: Pain:  Goal: Control of acute pain  Description: Control of acute pain  Outcome: Met This Shift

## 2021-01-25 NOTE — PROGRESS NOTES
Date: 1/24/2021    Time: 8:40 PM    Patient Placed On BIPAP/CPAP/ Non-Invasive Ventilation? Yes    If no must comment. Facial area red/color change? No           If YES are Blister/Lesion present? No   If yes must notify nursing staff  BIPAP/CPAP skin barrier?   Yes    Skin barrier type:mepilexlite       Comments:        Nasreen Ernandez

## 2021-01-25 NOTE — PROGRESS NOTES
Comprehensive Nutrition Assessment    Type and Reason for Visit:  Reassess    Nutrition Recommendations/Plan: Recommend to continue Boost Pudding supplement BID to help meet increased nutritional needs. Recommend and start Magic Cup supplement daily for additional calories, protein, and nutrients. Nutrition Assessment:  Patients po intake remains sporadic and decreased, averaging 25-50% of meals served ; pt meets criteria for severe malnutrition ; wounds noted ; moderate oropharyngeal dysphagia per speech ; hx of DM and COPD ; will continue ONS    Malnutrition Assessment:  Malnutrition Status:  Severe malnutrition    Context:  Chronic Illness     Findings of the 6 clinical characteristics of malnutrition:  Energy Intake:  7 - 75% or less estimated energy requirements for 1 month or longer  Weight Loss:  No significant weight loss     Body Fat Loss:  7 - Severe body fat loss Orbital, Triceps   Muscle Mass Loss:  7 - Severe muscle mass loss Temples (temporalis), Clavicles (pectoralis & deltoids)  Fluid Accumulation:  No significant fluid accumulation     Strength:  Not Performed    Estimated Daily Nutrient Needs:  Energy (kcal):  8773-6526 ( x 1.3 SF); Weight Used for Energy Requirements:  Current     Protein (g):  65-75 (1.5-1.8g/kg CBW); Weight Used for Protein Requirements:  Current        Fluid (ml/day):  4883-1551; Method Used for Fluid Requirements:  1 ml/kcal      Nutrition Related Findings:  +I&Os (+1.8 L), no edema, A&O x 2, active BS, redness to heels/buttocks, muscle/fat wasting      Wounds:  Multiple, Open Wounds, Deep Tissue Injury(wounds x 3)       Current Nutrition Therapies:    DIET DYSPHAGIA SOFT AND BITE-SIZED; Dysphagia Soft and Bite-Sized;  Moderately Thick (Honey)  Dietary Nutrition Supplements: Other Oral Supplement (see comment)    Anthropometric Measures:  · Height: 5' 2\" (157.5 cm)  · Current Body Weight: 90 lb (40.8 kg)(1/25, bedscale) · Admission Body Weight: 96 lb 5.5 oz (43.7 kg)(1/17 BS)    · Usual Body Weight: 93 lb (42.2 kg)(11/10/19, actual)     · Ideal Body Weight: 110 lbs; % Ideal Body Weight 81.8 %   · BMI: 16.5  · BMI Categories: Underweight (BMI less than 22) age over 72       Nutrition Diagnosis:   · Severe malnutrition, In context of chronic illness related to cognitive or neurological impairment as evidenced by poor intake prior to admission, severe loss of subcutaneous fat, severe muscle loss      Nutrition Interventions:   Food and/or Nutrient Delivery:  Continue Current Diet, Continue Oral Nutrition Supplement  Nutrition Education/Counseling:  Education not indicated   Coordination of Nutrition Care:  Continue to monitor while inpatient, Speech Therapy, Swallow Evaluation    Goals:  Patient will consume 50-75% of meals served       Nutrition Monitoring and Evaluation:   Behavioral-Environmental Outcomes:  Beliefs and Attitutes   Food/Nutrient Intake Outcomes:  Food and Nutrient Intake, Supplement Intake  Physical Signs/Symptoms Outcomes:  Biochemical Data, Chewing or Swallowing, GI Status, Fluid Status or Edema, Hemodynamic Status, Meal Time Behavior, Nutrition Focused Physical Findings, Skin, Weight     Discharge Planning:     Too soon to determine     Electronically signed by Felicity Sin RD, LD on 1/25/21 at 1:41 PM EST    Contact: [phone]

## 2021-01-25 NOTE — PROGRESS NOTES
Progress Note  Date:2021       TTPE:[id number]  Patient Name:Noemi Dunham     YOB: 1934     Age:86 y.o. Patient resting. Subjective    Subjective:  Symptoms:  Stable. She reports shortness of breath. Review of Systems   Constitutional: Positive for activity change. Negative for fever. Respiratory: Positive for shortness of breath. Objective         Vitals Last 24 Hours:  TEMPERATURE:  Temp  Av.4 °F (36.3 °C)  Min: 96.8 °F (36 °C)  Max: 98.6 °F (37 °C)  RESPIRATIONS RANGE: Resp  Av.6  Min: 18  Max: 22  PULSE OXIMETRY RANGE: SpO2  Av %  Min: 97 %  Max: 99 %  PULSE RANGE: Pulse  Av.7  Min: 65  Max: 75  BLOOD PRESSURE RANGE: Systolic (64DYG), KBV:516 , Min:110 , WOR:241   ; Diastolic (54YCI), CLY:15, Min:54, Max:56    I/O (24Hr): Intake/Output Summary (Last 24 hours) at 2021 0705  Last data filed at 2021 0444  Gross per 24 hour   Intake 660 ml   Output 0 ml   Net 660 ml     Objective:  General Appearance:  Comfortable. Vital signs: (most recent): Blood pressure (!) 110/54, pulse 72, temperature 98.6 °F (37 °C), temperature source Temporal, resp. rate 20, height 5' 2\" (1.575 m), weight 90 lb 3.2 oz (40.9 kg), SpO2 97 %. No fever. Lungs:  Normal effort. There are decreased breath sounds. Heart: Normal rate. Regular rhythm. S1 normal and S2 normal.      Labs/Imaging/Diagnostics    Labs:  CBC:  Recent Labs     21  0603 21  0543 21  0522   WBC 7.3 6.0 5.7   RBC 4.25 3.94 3.93   HGB 13.2 12.2 12.3   HCT 44.2 40.9 40.5   .0* 103.8* 103.1*   RDW 13.5 13.4 13.3   * 111* 113*     CHEMISTRIES:  Recent Labs     21  0603 21  0543    141   K 4.7 4.5   CL 96* 96*   CO2 40* 40*   BUN 22 20   CREATININE 0.7 0.6   GLUCOSE 139* 118*     PT/INR:  No results for input(s): PROTIME, INR in the last 72 hours. APTT:  No results for input(s): APTT in the last 72 hours.   LIVER PROFILE:

## 2021-01-25 NOTE — PROGRESS NOTES
Occupational Therapy  OT SESSION ATTEMPT      Date:2021  Patient Name: Chelly Deluna  MRN: [id number]  : 1934  Room: [address] [place] [address]     Attempted OT session this date:    [] unavailable due to other medical staff currently with pt   [x] on hold per nursing staff, not medically appropriate at this time   [] on hold per nursing staff secondary to lab / radiology results    [] declined treatment  this date due to ____. Benefits of participation in therapy reviewed with pt.    [] off unit   [] Other:      Will reattempt OT session as schedule permits when patient is appropriate.      Amina Mclain, OTR/L [id number]

## 2021-01-25 NOTE — PROGRESS NOTES
Palliative Care Department  [phone]  Palliative Care Progress Note  Provider Renetta CARRANZA CNP    Nickolas White  [id number]  Hospital Day: 11  Date of Initial Consult: 1/19/2021  Referring Provider: Kam Castro MD  Palliative Medicine was consulted for assistance with: Goals of Care, Code Status Discussion, Family Support    HPI:   Nickolas White is a 80 y.o. with a medical history of COPD on 4L 02, DM, dementia who was admitted on 1/15/2021 from home with a CHIEF COMPLAINT of syncope and hypoxia. Per chart review, patient's daughter reported that patient had been intermittently confused for couple weeks. Patient was recently diagnosed with a UTI and was given antibiotics. Upon presentation to ED, patient was found to be in respiratory distress with oxygen saturation 85% on 5 L nasal cannula. ABGs revealed that patient was hypercapnic and patient was placed on BiPAP. Patient admitted to telemetry for further work-up and management. ASSESSMENT/PLAN:     Pertinent Hospital Diagnoses     ? Acute on chronic respiratory failure  ? Bilateral pleural effusions  ? Dysphagia  ? Hx COPD  ? Hx Dementia  ? Hx DM        Palliative Care Encounter / Counseling Regarding Goals of Care  Please see detailed goals of care discussion as below  ? At this time, Nickolas White, Does Not have capacity for medical decision-making. Capacity is time limited and situation/question specific  ? Outcome of goals of care meeting: Continue current plan  ? Code status [id number]  ? Advanced Directives:  Living will in [facility]  ?  Surrogate/Legal NOK:  Evettemanuel Priscilla, child/POA, [phone]  Little Mayo, child, [phone]        Spiritual assessment: no spiritual distress identified  Bereavement and grief: to be determined  Referrals to: none today  SUBJECTIVE:     Current medical issues leading to Palliative Medicine involvement include   Active Hospital Problems    Diagnosis Date Noted

## 2021-01-25 NOTE — PROGRESS NOTES
Pulmonary Progress Note  1/25/2021 3:12 PM  Subjective:   Admit Date: 1/15/2021  PCP: Dianne Benavides MD  Interval History: not much improvement. On continuous BIPAP 14/6, 40%    Diet: DIET DYSPHAGIA SOFT AND BITE-SIZED; Dysphagia Soft and Bite-Sized; Moderately Thick (Honey)  Dietary Nutrition Supplements: Other Oral Supplement (see comment)  Dietary Nutrition Supplements: Frozen Oral Supplement  SOB is: Moderate  Cough: None  Wheezing: None  chest pain: None    Data:   Scheduled Meds:    insulin glargine  5 Units Subcutaneous Nightly    insulin lispro  0-6 Units Subcutaneous TID WC    insulin lispro  0-3 Units Subcutaneous Nightly    enoxaparin  40 mg Subcutaneous Daily    miconazole nitrate   Topical BID    insulin lispro  0-6 Units Subcutaneous Nightly    donepezil  20 mg Oral Nightly    fluticasone  1 spray Nasal Daily    prednisoLONE acetate  1 drop Both Eyes Daily    rosuvastatin  10 mg Oral Daily    sodium chloride flush  10 mL Intravenous 2 times per day    ipratropium-albuterol  1 ampule Inhalation Q4H WA    pantoprazole  40 mg Oral QAM AC       Continuous Infusions:    dextrose         PRN Meds: Mineral Oil-Hydrophil Petrolat, miconazole nitrate **AND** miconazole nitrate, sodium chloride flush, promethazine **OR** ondansetron, polyethylene glycol, acetaminophen **OR** acetaminophen, glucose, dextrose, glucagon (rDNA), dextrose    I/O last 3 completed shifts: In: 360 [P.O.:360]  Out: 0     No intake/output data recorded.       Intake/Output Summary (Last 24 hours) at 1/25/2021 1512  Last data filed at 1/25/2021 1503  Gross per 24 hour   Intake 360 ml   Output 0 ml   Net 360 ml       Patient Vitals for the past 96 hrs (Last 3 readings):   Weight   01/25/21 0444 90 lb 3.2 oz (40.9 kg)   01/24/21 0500 97 lb (44 kg)   01/23/21 0558 92 lb 3.2 oz (41.8 kg)         Recent Labs     01/23/21  0603 01/24/21  0543 01/25/21  0522   WBC 7.3 6.0 5.7   HGB 13.2 12.2 12.3   HCT 44.2 40.9 40.5 .0* 103.8* 103.1*   * 111* 113*     Recent Labs     01/23/21  0603 01/24/21  0543 01/25/21  0522    141 140   K 4.7 4.5 4.4   CL 96* 96* 96*   CO2 40* 40* 42*   BUN 22 20 16   CREATININE 0.7 0.6 0.6         -----------------------------------------------------------------  RAD:   Results for orders placed during the hospital encounter of 01/15/21   XR CHEST PORTABLE  1/23:    Extensive bilateral parenchymal and interstitial fibrosis and moderate sized   bilateral effusions are unchanged.  Heart size is normal.                 Narrative EXAMINATION:  ONE XRAY VIEW OF THE CHEST  1/15/2021 10:57 am  COMPARISON:  11/08/2019  HISTORY:  ORDERING SYSTEM PROVIDED HISTORY: hypoxia  TECHNOLOGIST PROVIDED HISTORY:  Reason for exam:->hypoxia  What reading provider will be dictating this exam?->CRC  FINDINGS:  Significant bilateral parenchymal and interstitial fibrosis is unchanged. Small bilateral pleural effusions are suspected. Heart size is normal.  There is a moderate amount of aorto iliac arterial vascular plaque. Impression No significant change   1/23                                                                               1/15    Micro:  Results for M[employer] (MRN [id number]) as of 1/23/2021 14:35   Ref.  Range 1/15/2021 11:09   Influenza A by PCR Latest Ref Range: Not Detected  Not Detected   Influenza B by PCR Latest Ref Range: Not Detected  Not Detected   Adenovirus by PCR Latest Ref Range: Not Detected  Not Detected   Coronavirus 229E by PCR Latest Ref Range: Not Detected  Not Detected   Coronavirus HKU1 by PCR Latest Ref Range: Not Detected  Not Detected   Coronavirus NL63 by PCR Latest Ref Range: Not Detected  Not Detected   Coronavirus OC43 by PCR Latest Ref Range: Not Detected  Not Detected   Human Metapneumovirus by PCR Latest Ref Range: Not Detected  Not Detected   Human Rhinovirus/Enterovirus by PCR Latest Ref Range: Not Detected  Not Detected Parainfluenza Virus 1 by PCR Latest Ref Range: Not Detected  Not Detected   Parainfluenza Virus 2 by PCR Latest Ref Range: Not Detected  Not Detected   Parainfluenza Virus 3 by PCR Latest Ref Range: Not Detected  Not Detected   Parainfluenza Virus 4 by PCR Latest Ref Range: Not Detected  Not Detected   Respiratory Syncytial Virus by PCR Latest Ref Range: Not Detected  Not Detected   Bordetella parapertussis by PCR Latest Ref Range: Not Detected  Not Detected   Chlamydophilia pneumoniae by PCR Latest Ref Range: Not Detected  Not Detected   Mycoplasma pneumoniae by PCR Latest Ref Range: Not Detected  Not Detected   SARS-CoV-2, PCR Latest Ref Range: Not Detected  Not Detected   Bordetella pertussis by PCR Latest Ref Range: Not Detected  Not Detected             Vent Information  Skin Assessment: Clean, dry, & intact  Equipment Changed: Humidification  Vt Ordered: 400 mL  FiO2 : 40 %  SpO2: 100 %  SpO2/FiO2 ratio: 242.5  I Time/ I Time %: 0.9 s  Mask Type: Full face mask  Mask Size: Small    Additional Respiratory  Assessments  Pulse: 69  Resp: 22  SpO2: 100 %    Objective:   Vitals:   Vitals:    21 0830   BP:    Pulse:    Resp:    Temp:    SpO2: 100%      TEMP:Current: Temp: 98.2 °F (36.8 °C)  Max: Temp  Av.9 °F (36.6 °C)  Min: 96.9 °F (36.1 °C)  Max: 98.6 °F (37 °C)    BP Range: Systolic (93WWG), GHV:305 , Min:103 , BCT:876     Diastolic (93MLA), QYV:43, Min:51, Max:56      General appearance: thin built,  appears stated age and cooperative  In no acute distress  Skin: No rashes or lesions  HEENT: mucous membranes are moist  Neck: No JVD  Lungs: symmetrical expansion, coarse rales/rhonchi throughout  Mildly labored, no acute distress now on bipap 50% Fio2  Heart: S1S2 no murmurs,  Abdomen: soft, non tender, BS+  Extremities: no peripheral edema  Neurologic: Alert, oriented times 3, KING  Affect: calm      Assessment:   Patient Active Problem List: 86 y.o. female 55-pack-year current smoker on chronic O2 at 4 L, diabetes, hypothyroid, DM, with history of dysphagia family refusing PEG tube presents on 1/15 with syncope and hypoxemia with change in mental status. 12/29 was diagnosed with UTI given antibiotics as an outpatient saturations were 68% on room air  1/15 Covid negative. CT head negative  Chest x-ray fibrosis small bilateral effusions with  1/16 placed on AVAPS  1/17 on10 L saturating 93%  1/18 swallow study showing moderate dysphagia with honey consistency. Bumex drip DC'd  1/20 palliative care spoke to family. No decisions made  1/22  40% FiO2 satting 100% on BiPAP  1/23 10L then desaturated to 70% after lunch, now on bipap     1. Acute on chronic hypercapnic respiratory failure   2. chronic hypoxemic respiratory failure baseline 4 L  3. CHF with elevated proBNP on Bumex  4. Echo showing EF of 68% mod MR mod pulm HTN EF nl DD 1 10/2019  5. Dysphasia swallow study 10/16/2019 aspiration with nectar consistency, 1/18 showing honey consistency  6. Baseline creatinine 0.4   7. Chronic  hypercapnic respiratory failure baseline PCO2 54 PO2 73 pH 7.48 as baseline  8. Moderate obstructive lung disease last FEV1 0.9 L 65% predicted no improvement with bronchodilators on 4/17/2019  9. dementia with CT showing white small vessel ischemic disease  10. History of calcified lung nodules and mediastinal lymphadenopathy reflective of granulomatous disease  11. COPD not wheezing   12. History of smoking at home family was allowing her to  cigarettes  13. DM  14. Dementia with CT showing small vessel ischemic disease  15. Hypothyroid   16. Recent admissions:   ·       Plan:   1.  recommend Bipap 4 hours on 4 hours off for now. Wean fio2 to keep spo2 90-95%  2. Cxr repeated 1/23--no change , failed MBS family is refusing peg tube, most likely is aspirating, extensive fibrosis   3.  Finished course of Rocephin and doxy 1/22 4. Will need to address feeding status if continuing to aspirate   5. IS for pulmonary hygiene  6. Favor Palliative care consult as terminal Lung disease and Hospice eval. Short & long term prognosis are not good, unfortunately.

## 2021-01-26 NOTE — PROGRESS NOTES
Date: 1/25/2021    Time: 10:37 PM    Patient Placed On BIPAP/CPAP/ Non-Invasive Ventilation? Yes    If no must comment. Facial area red/color change? Yes. Slight redness. Mepilex in place and mask loosened. If YES are Blister/Lesion present? No   If yes must notify nursing staff  BIPAP/CPAP skin barrier?   Yes    Skin barrier type:mepilexlite       Comments:        [employer]

## 2021-01-26 NOTE — CARE COORDINATION
Spoke with pt's daughter Solitario Avila to follow up on SNF choices vs home with palliative as per our last conversation on Friday. Per [place] no decision has been reached at this time. I again relayed, it appears as though pt's progress has plateaued, and prognosis is poor. [place] stated they would need a Bi-PAP at discharge if pt were to return home. I will relay to pulmonology.

## 2021-01-26 NOTE — PROGRESS NOTES
Chart reviewed. Awaiting family decision regarding SNF and continuation of care versus hospice. Palliative medicine remains available for ongoing goals of care/code status discussions as well as support for patient/family.

## 2021-01-26 NOTE — PROGRESS NOTES
No results for input(s): AST, ALT, BILIDIR, BILITOT, ALKPHOS in the last 72 hours. Imaging Last 24 Hours:  Ct Head Wo Contrast    Result Date: 1/15/2021  EXAMINATION: CT OF THE HEAD WITHOUT CONTRAST  1/15/2021 12:26 pm TECHNIQUE: CT of the head was performed without the administration of intravenous contrast. Dose modulation, iterative reconstruction, and/or weight based adjustment of the mA/kV was utilized to reduce the radiation dose to as low as reasonably achievable. COMPARISON: 09/11/2018 HISTORY: ORDERING SYSTEM PROVIDED HISTORY: AMS TECHNOLOGIST PROVIDED HISTORY: Reason for exam:->AMS Has a \"code stroke\" or \"stroke alert\" been called? ->No What reading provider will be dictating this exam?->CRC FINDINGS: BRAIN/VENTRICLES: There is no acute intracranial hemorrhage, mass effect or midline shift. No abnormal extra-axial fluid collection. The gray-white differentiation is maintained without evidence of an acute infarct. There is no evidence of hydrocephalus. The ventricles, cisterns and sulci are prominent consistent with atrophy. There is decreased attenuation within the periventricular white matter consistent with periventricular leukomalacia. ORBITS: The visualized portion of the orbits demonstrate no acute abnormality. SINUSES: The visualized paranasal sinuses and mastoid air cells demonstrate no acute abnormality. SOFT TISSUES/SKULL:  No acute abnormality of the visualized skull or soft tissues. 1. There is no acute intracranial abnormality. Specifically, there is no intracranial hemorrhage.  2. Atrophy and periventricular leukomalacia,    Xr Chest Portable    Result Date: 1/15/2021

## 2021-01-26 NOTE — PROGRESS NOTES
Occupational Therapy  OT SESSION ATTEMPT      Date:2021  Patient Name: Jere Christine  MRN: [id number]  : 1934  Room: [address] [place] [address]     Attempted OT session this date:    [] unavailable due to other medical staff currently with pt   [] on hold per nursing staff   [] on hold per nursing staff secondary to lab / radiology results    [x] declined treatment  this date due to _patient declining to get OOB___. Benefits of participation in therapy reviewed with pt.    [] off unit   [] Other:      Will reattempt OT session as schedule permits when patient is appropriate.      Farrah Junior, OTR/L [id number]

## 2021-01-26 NOTE — PLAN OF CARE
Problem: Skin Integrity:  Goal: Will show no infection signs and symptoms  Description: Will show no infection signs and symptoms  Outcome: Met This Shift     Problem: Falls - Risk of:  Goal: Will remain free from falls  Description: Will remain free from falls  Outcome: Met This Shift     Problem: Pain:  Description: Pain management should include both nonpharmacologic and pharmacologic interventions.   Goal: Control of chronic pain  Description: Control of chronic pain  Outcome: Met This Shift

## 2021-01-26 NOTE — PROGRESS NOTES
Pulmonary Progress Note  1/26/2021 4:22 PM  Subjective:   Admit Date: 1/15/2021  PCP: Martin Hansen MD  Interval History: not much improvement. On and off BIPAP 14/6, 40%  Currently on 10 L high flow nasal cannula  Refusing physical and Occupational Therapy    Diet: DIET DYSPHAGIA SOFT AND BITE-SIZED; Dysphagia Soft and Bite-Sized; Moderately Thick (Honey)  Dietary Nutrition Supplements: Other Oral Supplement (see comment)  Dietary Nutrition Supplements: Frozen Oral Supplement  SOB is: Moderate  Cough: None  Wheezing: None  chest pain: None    Data:   Scheduled Meds:    insulin glargine  5 Units Subcutaneous Nightly    insulin lispro  0-6 Units Subcutaneous TID WC    insulin lispro  0-3 Units Subcutaneous Nightly    enoxaparin  40 mg Subcutaneous Daily    miconazole nitrate   Topical BID    insulin lispro  0-6 Units Subcutaneous Nightly    donepezil  20 mg Oral Nightly    fluticasone  1 spray Nasal Daily    prednisoLONE acetate  1 drop Both Eyes Daily    rosuvastatin  10 mg Oral Daily    sodium chloride flush  10 mL Intravenous 2 times per day    ipratropium-albuterol  1 ampule Inhalation Q4H WA    pantoprazole  40 mg Oral QAM AC       Continuous Infusions:    dextrose         PRN Meds: Mineral Oil-Hydrophil Petrolat, miconazole nitrate **AND** miconazole nitrate, sodium chloride flush, promethazine **OR** ondansetron, polyethylene glycol, acetaminophen **OR** acetaminophen, glucose, dextrose, glucagon (rDNA), dextrose    I/O last 3 completed shifts: In: 1 [P.O.:960; I.V.:10]  Out: -     No intake/output data recorded.       Intake/Output Summary (Last 24 hours) at 1/26/2021 1622  Last data filed at 1/26/2021 1456  Gross per 24 hour   Intake 970 ml   Output    Net 970 ml       Patient Vitals for the past 96 hrs (Last 3 readings):   Weight   01/26/21 0523 93 lb 2 oz (42.2 kg)   01/25/21 0444 90 lb 3.2 oz (40.9 kg)   01/24/21 0500 97 lb (44 kg)         Recent Labs     01/24/21  0543 01/25/21 0522 01/26/21  0546   WBC 6.0 5.7 5.5   HGB 12.2 12.3 11.9   HCT 40.9 40.5 38.9   .8* 103.1* 101.3*   * 113* 117*     Recent Labs     01/24/21  0543 01/25/21  0522 01/26/21  0546    140 139   K 4.5 4.4 4.5   CL 96* 96* 94*   CO2 40* 42* 43*   BUN 20 16 15   CREATININE 0.6 0.6 0.5         -----------------------------------------------------------------  RAD:   Results for orders placed during the hospital encounter of 01/15/21   XR CHEST PORTABLE  1/23:    Extensive bilateral parenchymal and interstitial fibrosis and moderate sized   bilateral effusions are unchanged.  Heart size is normal.                 Narrative EXAMINATION:  ONE XRAY VIEW OF THE CHEST  1/15/2021 10:57 am  COMPARISON:  11/08/2019  HISTORY:  ORDERING SYSTEM PROVIDED HISTORY: hypoxia  TECHNOLOGIST PROVIDED HISTORY:  Reason for exam:->hypoxia  What reading provider will be dictating this exam?->CRC  FINDINGS:  Significant bilateral parenchymal and interstitial fibrosis is unchanged. Small bilateral pleural effusions are suspected. Heart size is normal.  There is a moderate amount of aorto iliac arterial vascular plaque. Impression No significant change   1/23                                                                               1/15    Micro:  Results for Racquel Melgar (MRN [id number]) as of 1/23/2021 14:35   Ref.  Range 1/15/2021 11:09   Influenza A by PCR Latest Ref Range: Not Detected  Not Detected   Influenza B by PCR Latest Ref Range: Not Detected  Not Detected   Adenovirus by PCR Latest Ref Range: Not Detected  Not Detected   Coronavirus 229E by PCR Latest Ref Range: Not Detected  Not Detected   Coronavirus HKU1 by PCR Latest Ref Range: Not Detected  Not Detected   Coronavirus NL63 by PCR Latest Ref Range: Not Detected  Not Detected   Coronavirus OC43 by PCR Latest Ref Range: Not Detected  Not Detected   Human Metapneumovirus by PCR Latest Ref Range: Not Detected  Not Detected Human Rhinovirus/Enterovirus by PCR Latest Ref Range: Not Detected  Not Detected   Parainfluenza Virus 1 by PCR Latest Ref Range: Not Detected  Not Detected   Parainfluenza Virus 2 by PCR Latest Ref Range: Not Detected  Not Detected   Parainfluenza Virus 3 by PCR Latest Ref Range: Not Detected  Not Detected   Parainfluenza Virus 4 by PCR Latest Ref Range: Not Detected  Not Detected   Respiratory Syncytial Virus by PCR Latest Ref Range: Not Detected  Not Detected   Bordetella parapertussis by PCR Latest Ref Range: Not Detected  Not Detected   Chlamydophilia pneumoniae by PCR Latest Ref Range: Not Detected  Not Detected   Mycoplasma pneumoniae by PCR Latest Ref Range: Not Detected  Not Detected   SARS-CoV-2, PCR Latest Ref Range: Not Detected  Not Detected   Bordetella pertussis by PCR Latest Ref Range: Not Detected  Not Detected             Vent Information  Skin Assessment: Redness (see comment/note)(blanchable)  Equipment Changed: Humidification  Vt Ordered: 400 mL  FiO2 : 40 %  SpO2: 95 %  SpO2/FiO2 ratio: 245  I Time/ I Time %: 0.9 s  Mask Type: Full face mask  Mask Size: Small    Additional Respiratory  Assessments  Pulse: 86  Resp: 24  SpO2: 95 %    Objective:   Vitals:   Vitals:    21 1557   BP:    Pulse:    Resp:    Temp:    SpO2: 95%      TEMP:Current: Temp: 99.8 °F (37.7 °C)  Max: Temp  Av.2 °F (37.3 °C)  Min: 98.9 °F (37.2 °C)  Max: 99.8 °F (37.7 °C)    BP Range: Systolic (91NPB), RL , Min:118 , VIW:766     Diastolic (79IUA), YPP:62, Min:53, Max:59      General appearance: thin built,  appears stated age and cooperative  In no acute distress  Skin: No rashes or lesions  HEENT: mucous membranes are moist  Neck: No JVD  Lungs: symmetrical expansion, coarse rales/rhonchi throughout  Mildly labored, no acute distress now on bipap 50% Fio2  Heart: S1S2 no murmurs,  Abdomen: soft, non tender, BS+  Extremities: no peripheral edema  Neurologic: Alert, oriented times 3, KING  Affect: calm Assessment:   Patient Active Problem List:      80 y.o. female 55-pack-year current smoker on chronic O2 at 4 L, diabetes, hypothyroid, DM, with history of dysphagia family refusing PEG tube presents on 1/15 with syncope and hypoxemia with change in mental status. 12/29 was diagnosed with UTI given antibiotics as an outpatient saturations were 68% on room air  1/15 Covid negative. CT head negative  Chest x-ray fibrosis small bilateral effusions with  1/16 placed on AVAPS  1/17 on10 L saturating 93%  1/18 swallow study showing moderate dysphagia with honey consistency. Bumex drip DC'd  1/20 palliative care spoke to family. No decisions made  1/22  40% FiO2 satting 100% on BiPAP  1/23 10L then desaturated to 70% after lunch, now on bipap     1. Acute on chronic hypercapnic respiratory failure   2. chronic hypoxemic respiratory failure baseline 4 L  3. CHF with elevated proBNP on Bumex  4. Echo showing EF of 68% mod MR mod pulm HTN EF nl DD 1 10/2019  5. Dysphasia swallow study 10/16/2019 aspiration with nectar consistency, 1/18 showing honey consistency  6. Baseline creatinine 0.4   7. Chronic  hypercapnic respiratory failure baseline PCO2 54 PO2 73 pH 7.48 as baseline  8. Moderate obstructive lung disease last FEV1 0.9 L 65% predicted no improvement with bronchodilators on 4/17/2019  9. dementia with CT showing white small vessel ischemic disease  10. History of calcified lung nodules and mediastinal lymphadenopathy reflective of granulomatous disease  11. COPD not wheezing   12. History of smoking at home family was allowing her to  cigarettes  13. DM  14. Dementia with CT showing small vessel ischemic disease  15. Hypothyroid   16. Recent admissions:   ·       Plan:   1.  recommend Bipap 4 hours on 4 hours off for now. Wean fio2 to keep spo2 90-95%  2.   Cxr repeated 1/23--no change , failed MBS family is refusing peg tube, most likely is aspirating, extensive fibrosis 3. Finished course of Rocephin and doxy 1/22   4. Will need to address feeding status if continuing to aspirate   5. IS for pulmonary hygiene  6. We are going to add LABA and oral corticosteroids  7. Favor Palliative care consult as terminal Lung disease and Hospice eval. Short & long term prognosis and quality of life are not good, unfortunately.

## 2021-01-27 NOTE — PROGRESS NOTES
Date: 1/26/2021    Time: 10:48 PM    Patient Placed On BIPAP/CPAP/ Non-Invasive Ventilation? Yes    If no must comment. Facial area red/color change? Yes           If YES are Blister/Lesion present? Yes   If yes must notify nursing staff  BIPAP/CPAP skin barrier?   Yes    Skin barrier type:mepilexlite       Comments:        Louie Melgoza

## 2021-01-27 NOTE — PROGRESS NOTES
Physical Therapy  Treatment Note   Name: Diane Krueger  : 1934  MRN: [id number]    Referring Provider:  Shelton Nelson MD    Date of Service: 2021    Evaluating PT:  Kori Sada, PT, DPT [id number]    Room #:  0312/5988-G  Diagnosis:  Acute Hypercapnic Respiratory Failure  PMHx/PSHx:  DM, arthritis, HLD, hiatal hernia, COPD, oxygen dependent 4L home O2, dementia  Precautions:  Falls, O2 via HFNC, cognition  Equipment Needs:  TBD    SUBJECTIVE:  Pt is questionable historian at this time but reports the following:  Pt lives with daughter in a 1 story home with 2 stairs to enter and 2 rail. Bed is on first floor and bath is on first floor. Pt ambulated with no AD independently PTA. OBJECTIVE:   Initial Evaluation  Date: 2021 Treatment  Date: 2021 Short Term/ Long Term   Goals   AM-PAC 6 Clicks     Was pt agreeable to Eval/treatment? yes yes    Does pt have pain?  No c/o pain No c/o pain    Bed Mobility  Rolling: maxA  Supine to sit: maxA  Sit to supine: maxA  Scooting: maxA Lynda all aspects Rolling: Lynda  Supine to sit: Lynda  Sit to supine: Lynda  Scooting: Lynda   Transfers Sit to stand: modA  Stand to sit: modA  Stand pivot: NT Sit<>stand: Lynda  Stand pivot Lynda front [employer] Sit to stand: Lynda  Stand to sit: Lynda  Stand pivot: Lynda front [employer]   Ambulation    side steps at [facility] NT 25 feet with front [employer] negotiation: ascended and descended  NT NT 2 steps with 1 rail Lynda   ROM BUE:  Per OT note  BLE:  WNL     Strength BUE:  Per OT note  BLE:  NT     Balance Sitting EOB:  Lynda<>SBA  Dynamic Standing:  maxA Sitting EOB: SBA  Dynamic standing: Lynda front [employer] Sitting EOB:  Independent  Dynamic Standing:  Lynda front [employer]     Pt is A & O x 2 (self, place)  Sensation:  Pt denies numbness and tingling to extremities  Edema:  unremarkable    Vitals:  Spo2 monitored throughout session:  Resting 10L 99%  With activity 10L 98%  Resting 8L 98% at end of session Patient education  Pt educated on role of PT intervention. Pt educated on safety in room with utilization of call light for assistance with mobility. Pt educated on importance of maximizing OOB time with assistance from therapy and nursing staff for improved overall mobility and activity tolerance. Patient response to education:   Pt verbalized understanding Pt demonstrated skill Pt requires further education in this area   yes yes yes     ASSESSMENT:    Comments:  RN cleared pt for activity prior to session. Pt received supine in bed and agreeable to PT intervention at this time. Pt performed all functional mobility as noted above. Pt agreeable to OOB activity and demonstrating overall improved mobility, activity tolerance, and SPO2. At end of session, pt transferred to bedside chair and left with all needs met and call light in reach. Pt requires continued skilled PT intervention for the purposes of maximizing functional mobility and independence. Treatment:  Patient practiced and was instructed in the following treatment:    ? Therapeutic Activities Completed:  o Functional mobility as noted above:   ? Bed mobility: Lynda to complete with bed rail. Cues and hand over hand guidance to facilitate efficient use of BUE on bed rail for more independent completion of task. Pt instructed in PLB at this time as well with fair follow through of proper technique. ? Transfer training: STS Lydna from EOB. Pt then performed stand pivot with front [employer] bed>chair Lynda level. Cues throughout for proper hand placement and sequencing.  o Skilled repositioning in seated position for comfort and good posture to promote improved cardiorespiratory function. o Pt education as noted above.  o Skilled vitals monitoring as noted above. PLAN:    Patient is making fair progress towards established goals. Will continue with current POC.       Time in  0945  Time out  1000    Total Treatment Time  15 minutes CPT codes:  [] Gait training 14777 0 minutes  [] Manual therapy 77566 0 minutes  [x] Therapeutic activities 33900 15 minutes  [] Therapeutic exercises 11292 0 minutes  [] Neuromuscular reeducation 78872 0 minutes    Miguel Sher PT, DPT  [id number]

## 2021-01-27 NOTE — PROGRESS NOTES
Call placed to [facility] regarding hospice consult at [phone]- I received a voicemail and left a message. I will follow up in the morning if I do not receive a call back this afternoon.

## 2021-01-27 NOTE — PROGRESS NOTES
Occupational Therapy  OT BEDSIDE TREATMENT NOTE      Date:2021  Patient Name: Javan Ceja  MRN: [id number]  : 1934  Room: [address] [place] [address]     Per OT Eval:      Evaluating OT: Marline Garcia OTR/L #[id number]     Referring physician: Kelly Rivera MD        AM-PAC Daily Activity Raw Score: 10/24  AM-PAC Daily Activity Raw Score:  (21)  Recommended Adaptive Equipment: TBD      Reason for Admission/Diagnosis: Acute hypercapnic respiratory failure  Pertinent Medical History/Surgery:acid reflux, arthritis, COPD, dementia, HLD, O2 dependent, B lumpectomy      Precautions:  Falls, tele/continuous pulseox, alarm/impaired cognition, HFNC supplemental O2, modified diet     Home Living: Pt lives with daughter in a one story house with 2 steps to enter with B railings. Bedroom and bathroom are located on the first floor. Bathroom setup: Tub/shower with shower chair, elevated toilet seat   Equipment owned: shower chair  Prior Level of Function:  Patient is a questionable historian. Patient reports assist with ADLs. Patient's daughter assists with dressing and bathing,  assist with IADLs. Patient reports daughter works limiting availability to assist.  Patient was using no device for functional mobility.    Driving: no                                 21  Pain Level: L UE pain reported, patient did not rate intensity  Cognition: A&O: self:yes, place:yes, time: no, situation:no  Follows 1 step directions with increased processing time              Memory:  fair               Sequencing:  fair               Problem solving:  poor              Judgement/safety:  poor                Functional Assessment:    Initial Eval Status  Date: 21 Treatment Status  Date: 21 Short Term Goals=LTG  Treatment frequency: PRN 1-3 x/week   Feeding Minimal Assist   Completed supine with HOB elevated Minimal Assist ->SBA  Completed while seated in chair   Modified [place]    Grooming Moderate Assist Simulated grooming tasks  Minimal Assist  Combed hair s while seated EOB Modified [place]     UB Dressing Moderate Assist  NT Modified [place]    LB Dressing Dependent   Total A to don socks   NT  Moderate Assist    Bathing Dependent   NT  Moderate Assist    Toileting Dependent  Dependent   Patient incontinent of bladder, total A for kirk-hygiene  Moderate Assist    Bed Mobility  Supine to sit: Max A   Sit to supine: Max A  Verbal/tactile cues for sequencing and technique Supine to sit: Mod A  Sit to supine: NT  Supine to sit: Min A   Sit to supine: Min A   Functional Transfers Sit to stand: Mod A  Stand to sit: Mod A  Verbal cues for safety, without device Sit to stand: Min A  Stand to sit: Min A  Bed>chair stand pivot transfer: Min A   Using fww SBA using fww   Functional Mobility Mod A  Completed ~2 side steps without device NT  SBA using fww  Bed<>bathroom   Balance Sitting:     Static:SBA, kyphotic posture observed    Dynamic:NT  Standing: Mod A       Activity Tolerance Fair-   Fair+   Visual/  Perceptual Glasses: yes           Safety       Fair                  Fair+         Treatment: OT treatment provided this date includes:  ? ADL training-  Instruction/training on safety and adapted techniques for completion of ADLs: Patient completed grooming tasks to comb hair while seated edge of bed with CGA for balance using R UE to complete. Patient noted to be incontinent of urine with standing with total A for kirk-hygiene and min A to maintain standing balance during self-care. Facilitated feeding while seated in chair. Min A initially to grasp cup with set-up required with patient able to progress to SBA with self-feeding using R hand to grasp utensil. Verbal cues for safety and pace.

## 2021-01-27 NOTE — PROGRESS NOTES
Date: 1/27/2021    Time: 8:20 AM    Patient Placed On BIPAP/CPAP/ Non-Invasive Ventilation? No    If no must comment. Facial area red/color change? No           If YES are Blister/Lesion present? No   If yes must notify nursing staff  BIPAP/CPAP skin barrier?   Yes    Skin barrier type:mepilexlite       Comments:    Pt currently on Nc 10    0778 Jero

## 2021-01-27 NOTE — PROGRESS NOTES
Palliative Care Department  [phone]  Palliative Care Progress Note  Provider Amanuel CARRANZA CNP    Shirley Buck  [id number]  Hospital Day: 13  Date of Initial Consult: 1/19/2021  Referring Provider: Kayden Stone MD  Palliative Medicine was consulted for assistance with: Goals of Care, Code Status Discussion, Family Support    HPI:   Shirley Buck is a 80 y.o. with a medical history of COPD on 4L 02, DM, dementia who was admitted on 1/15/2021 from home with a CHIEF COMPLAINT of syncope and hypoxia. Per chart review, patient's daughter reported that patient had been intermittently confused for couple weeks. Patient was recently diagnosed with a UTI and was given antibiotics. Upon presentation to ED, patient was found to be in respiratory distress with oxygen saturation 85% on 5 L nasal cannula. ABGs revealed that patient was hypercapnic and patient was placed on BiPAP. Patient admitted to telemetry for further work-up and management. ASSESSMENT/PLAN:     Pertinent Hospital Diagnoses     ? Acute on chronic respiratory failure  ? Bilateral pleural effusions  ? Dysphagia  ? Hx COPD  ? Hx Dementia  ? Hx DM        Palliative Care Encounter / Counseling Regarding Goals of Care  Please see detailed goals of care discussion as below  ? At this time, Shirley Buck, Does Not have capacity for medical decision-making. Capacity is time limited and situation/question specific  ? Outcome of goals of care meeting: Continue current management, awaiting family decision regarding d/c plan   ? Code status DNR-CCA  ? Advanced Directives:  Living will in [facility]  ?  Surrogate/Legal NOK:  Brittney Cee, child/POA, [phone]  Ormaxime Hope, child, [phone]        Spiritual assessment: no spiritual distress identified  Bereavement and grief: to be determined  Referrals to: none today  SUBJECTIVE:     Current medical issues leading to Palliative Medicine involvement include   Active Hospital Problems Diagnosis Date Noted    Severe protein-calorie malnutrition ([facility] [E43] 01/25/2021    [facility] [J96.02] 01/15/2021       Details of Conversation: Chart reviewed. Updated by case management. Patient's daughter, Leonidas Carney, to come to bedside this afternoon. Family to have decision regarding plan of care tomorrow. Palliative medicine remains available for ongoing support. Will await family decision. OBJECTIVE:   Prognosis: unknown    Physical Exam:  BP (!) 103/50   Pulse 65   Temp 97 °F (36.1 °C) (Temporal)   Resp 18   Ht 5' 2\" (1.575 m)   Wt 93 lb 2 oz (42.2 kg)   SpO2 98%   BMI 17.03 kg/m²   Constitutional:  Elderly, thin, NAD, awake, alert  Eyes: No scleral icterus, normal lids, no discharge  ENMT:  Normocephalic, atraumatic, mucosa moist, EOMI  Neck:  Trachea midline, no JVD  Lungs:  HFNC  Heart:  RRR   Abd:  Soft, non distended  Ext:  Moving all extremities, no edema, pulses present  Skin:  Warm and dry  Neuro:  Alert     Objective data reviewed: labs, images, records, medication use, vitals and chart    Discussed patient and the plan of care with the other IDT members: Palliative Medicine IDT Team    Time/Communication  Greater than 50% of time spent, total 10 minutes in counseling and coordination of care at the bedside regarding goals of care, diagnosis and prognosis and see above. Thank you for allowing Palliative Medicine to participate in the care of [employer].

## 2021-01-27 NOTE — PROGRESS NOTES
Pulmonary Progress Note  1/27/2021 3:17 PM  Subjective:   Admit Date: 1/15/2021  PCP: Jose Urbano MD  Interval History: not much improvement. On and off BIPAP 14/6, 40%  Currently on 8 L high flow nasal cannula  Refusing physical and Occupational Therapy    Diet: DIET DYSPHAGIA SOFT AND BITE-SIZED; Dysphagia Soft and Bite-Sized; Moderately Thick (Honey)  Dietary Nutrition Supplements: Other Oral Supplement (see comment)  Dietary Nutrition Supplements: Frozen Oral Supplement  SOB is: Moderate  Cough: None  Wheezing: None  chest pain: None    Data:   Scheduled Meds:    Arformoterol Tartrate  15 mcg Nebulization BID    predniSONE  10 mg Oral Daily    insulin glargine  5 Units Subcutaneous Nightly    insulin lispro  0-6 Units Subcutaneous TID WC    insulin lispro  0-3 Units Subcutaneous Nightly    enoxaparin  40 mg Subcutaneous Daily    miconazole nitrate   Topical BID    insulin lispro  0-6 Units Subcutaneous Nightly    donepezil  20 mg Oral Nightly    fluticasone  1 spray Nasal Daily    prednisoLONE acetate  1 drop Both Eyes Daily    rosuvastatin  10 mg Oral Daily    sodium chloride flush  10 mL Intravenous 2 times per day    ipratropium-albuterol  1 ampule Inhalation Q4H WA    pantoprazole  40 mg Oral QAM AC       Continuous Infusions:    dextrose         PRN Meds: Mineral Oil-Hydrophil Petrolat, miconazole nitrate **AND** miconazole nitrate, sodium chloride flush, promethazine **OR** ondansetron, polyethylene glycol, acetaminophen **OR** acetaminophen, glucose, dextrose, glucagon (rDNA), dextrose    No intake/output data recorded. No intake/output data recorded.     No intake or output data in the 24 hours ending 01/27/21 1517    Patient Vitals for the past 96 hrs (Last 3 readings):   Weight   01/26/21 0523 93 lb 2 oz (42.2 kg)   01/25/21 0444 90 lb 3.2 oz (40.9 kg)   01/24/21 0500 97 lb (44 kg)         Recent Labs     01/25/21  0522 01/26/21  0546 01/27/21  0611   WBC 5.7 5.5 5.0 HGB 12.3 11.9 10.8*   HCT 40.5 38.9 35.7   .1* 101.3* 100.8*   * 117* 111*     Recent Labs     01/25/21  0522 01/26/21  0546 01/27/21  0611    139 138   K 4.4 4.5 4.7   CL 96* 94* 94*   CO2 42* 43* 42*   BUN 16 15 12   CREATININE 0.6 0.5 0.6         -----------------------------------------------------------------  RAD:   Results for orders placed during the hospital encounter of 01/15/21   XR CHEST PORTABLE  1/23:    Extensive bilateral parenchymal and interstitial fibrosis and moderate sized   bilateral effusions are unchanged.  Heart size is normal.                 Narrative EXAMINATION:  ONE XRAY VIEW OF THE CHEST  1/15/2021 10:57 am  COMPARISON:  11/08/2019  HISTORY:  ORDERING SYSTEM PROVIDED HISTORY: hypoxia  TECHNOLOGIST PROVIDED HISTORY:  Reason for exam:->hypoxia  What reading provider will be dictating this exam?->CRC  FINDINGS:  Significant bilateral parenchymal and interstitial fibrosis is unchanged. Small bilateral pleural effusions are suspected. Heart size is normal.  There is a moderate amount of aorto iliac arterial vascular plaque. Impression No significant change   1/23                                                                               1/15    Micro:  Results for Alphonso Walsh (MRN [id number]) as of 1/23/2021 14:35   Ref.  Range 1/15/2021 11:09   Influenza A by PCR Latest Ref Range: Not Detected  Not Detected   Influenza B by PCR Latest Ref Range: Not Detected  Not Detected   Adenovirus by PCR Latest Ref Range: Not Detected  Not Detected   Coronavirus 229E by PCR Latest Ref Range: Not Detected  Not Detected   Coronavirus HKU1 by PCR Latest Ref Range: Not Detected  Not Detected   Coronavirus NL63 by PCR Latest Ref Range: Not Detected  Not Detected   Coronavirus OC43 by PCR Latest Ref Range: Not Detected  Not Detected   Human Metapneumovirus by PCR Latest Ref Range: Not Detected  Not Detected Assessment:   Patient Active Problem List:      80 y.o. female 55-pack-year current smoker on chronic O2 at 4 L, diabetes, hypothyroid, DM, with history of dysphagia family refusing PEG tube presents on 1/15 with syncope and hypoxemia with change in mental status. 12/29 was diagnosed with UTI given antibiotics as an outpatient saturations were 68% on room air  1/15 Covid negative. CT head negative  Chest x-ray fibrosis small bilateral effusions with  1/16 placed on AVAPS  1/17 on10 L saturating 93%  1/18 swallow study showing moderate dysphagia with honey consistency. Bumex drip DC'd  1/20 palliative care spoke to family. No decisions made  1/22  40% FiO2 satting 100% on BiPAP  1/23 10L then desaturated to 70% after lunch, now on bipap     1. Acute on chronic hypercapnic respiratory failure   2. chronic hypoxemic respiratory failure baseline 4 L  3. CHF with elevated proBNP on Bumex  4. Echo showing EF of 68% mod MR mod pulm HTN EF nl DD 1 10/2019  5. Dysphasia swallow study 10/16/2019 aspiration with nectar consistency, 1/18 showing honey consistency  6. Baseline creatinine 0.4   7. Chronic  hypercapnic respiratory failure baseline PCO2 54 PO2 73 pH 7.48 as baseline  8. Moderate obstructive lung disease last FEV1 0.9 L 65% predicted no improvement with bronchodilators on 4/17/2019  9. dementia with CT showing white small vessel ischemic disease  10. History of calcified lung nodules and mediastinal lymphadenopathy reflective of granulomatous disease  11. COPD not wheezing   12. History of smoking at home family was allowing her to  cigarettes  13. DM  14. Dementia with CT showing small vessel ischemic disease  15. Hypothyroid   16. 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  recommend Bipap 4 hours on 4 hours off for now. Wean fio2 to keep spo2 90-95%  2.   Cxr repeated 1/23--no change , failed MBS family is refusing peg tube, most likely is aspirating, extensive fibrosis 3. Finished course of Rocephin and doxy 1/22   4. Will need to address feeding status if continuing to aspirate   5. IS for pulmonary hygiene  6. Continue LABA and oral corticosteroids  7. Favor Palliative care consult as terminal Lung disease and Hospice eval. Short & long term prognosis and quality of life are not good, unfortunately.

## 2021-01-27 NOTE — CARE COORDINATION
Received VM from pt's daughter Enoc Del Cid yesterday after hours. I returned her call and discussed next level of care options which are limited to home or SNF with Hospice vs [place]. All questions answered, Enoc Del Cid to visit pt this evening then meet with Kim Rachana her sister and together they will discuss with the rest of family to make a determination by tomorrow. Per Enoc Del Cid should they use Hospice they have already chosen [facility].

## 2021-01-27 NOTE — PROGRESS NOTES
Progress Note  Date:2021       ZIMQ:[id number]  Patient Name:Noemi Dunham     YOB: 1934     Age:86 y.o. Patient resting. Subjective    Subjective:  Symptoms:  Stable. She reports shortness of breath. Review of Systems   Constitutional: Positive for activity change. Negative for fever. Respiratory: Positive for shortness of breath. Objective         Vitals Last 24 Hours:  TEMPERATURE:  Temp  Av.2 °F (37.3 °C)  Min: 98.6 °F (37 °C)  Max: 99.8 °F (37.7 °C)  RESPIRATIONS RANGE: Resp  Av  Min: 19  Max: 24  PULSE OXIMETRY RANGE: SpO2  Av.6 %  Min: 79 %  Max: 100 %  PULSE RANGE: Pulse  Av.8  Min: 69  Max: 86  BLOOD PRESSURE RANGE: Systolic (24ITM), VFV:507 , Min:113 , QRQ:794   ; Diastolic (20ECK), EXC:51, Min:53, Max:59    I/O (24Hr): Intake/Output Summary (Last 24 hours) at 2021 0633  Last data filed at 2021 1456  Gross per 24 hour   Intake 660 ml   Output    Net 660 ml     Objective:  General Appearance:  Comfortable. Vital signs: (most recent): Blood pressure (!) 113/54, pulse 78, temperature 98.6 °F (37 °C), temperature source Temporal, resp. rate 22, height 5' 2\" (1.575 m), weight 93 lb 2 oz (42.2 kg), SpO2 98 %. No fever. Lungs:  Normal effort. There are decreased breath sounds. Heart: Normal rate. Regular rhythm. S1 normal and S2 normal.      Labs/Imaging/Diagnostics    Labs:  CBC:  Recent Labs     21  0522 21  0546   WBC 5.7 5.5   RBC 3.93 3.84   HGB 12.3 11.9   HCT 40.5 38.9   .1* 101.3*   RDW 13.3 13.4   * 117*     CHEMISTRIES:  Recent Labs     21  0522 21  0546    139   K 4.4 4.5   CL 96* 94*   CO2 42* 43*   BUN 16 15   CREATININE 0.6 0.5   GLUCOSE 138* 114*     PT/INR:  No results for input(s): PROTIME, INR in the last 72 hours. APTT:  No results for input(s): APTT in the last 72 hours.   LIVER PROFILE: No results for input(s): AST, ALT, BILIDIR, BILITOT, ALKPHOS in the last 72 hours. Imaging Last 24 Hours:  Ct Head Wo Contrast    Result Date: 1/15/2021  EXAMINATION: CT OF THE HEAD WITHOUT CONTRAST  1/15/2021 12:26 pm TECHNIQUE: CT of the head was performed without the administration of intravenous contrast. Dose modulation, iterative reconstruction, and/or weight based adjustment of the mA/kV was utilized to reduce the radiation dose to as low as reasonably achievable. COMPARISON: 09/11/2018 HISTORY: ORDERING SYSTEM PROVIDED HISTORY: AMS TECHNOLOGIST PROVIDED HISTORY: Reason for exam:->AMS Has a \"code stroke\" or \"stroke alert\" been called? ->No What reading provider will be dictating this exam?->CRC FINDINGS: BRAIN/VENTRICLES: There is no acute intracranial hemorrhage, mass effect or midline shift. No abnormal extra-axial fluid collection. The gray-white differentiation is maintained without evidence of an acute infarct. There is no evidence of hydrocephalus. The ventricles, cisterns and sulci are prominent consistent with atrophy. There is decreased attenuation within the periventricular white matter consistent with periventricular leukomalacia. ORBITS: The visualized portion of the orbits demonstrate no acute abnormality. SINUSES: The visualized paranasal sinuses and mastoid air cells demonstrate no acute abnormality. SOFT TISSUES/SKULL:  No acute abnormality of the visualized skull or soft tissues. 1. There is no acute intracranial abnormality. Specifically, there is no intracranial hemorrhage.  2. Atrophy and periventricular leukomalacia,    Xr Chest Portable    Result Date: 1/15/2021 EXAMINATION: ONE XRAY VIEW OF THE CHEST 1/15/2021 10:57 am COMPARISON: 11/08/2019 HISTORY: ORDERING SYSTEM PROVIDED HISTORY: hypoxia TECHNOLOGIST PROVIDED HISTORY: Reason for exam:->hypoxia What reading provider will be dictating this exam?->CRC FINDINGS: Significant bilateral parenchymal and interstitial fibrosis is unchanged. Small bilateral pleural effusions are suspected. Heart size is normal. There is a moderate amount of aorto iliac arterial vascular plaque.     No significant change    Assessment//Plan           Hospital Problems           Last Modified POA    Severe protein-calorie malnutrition (HCC) (Chronic) 1/25/2021 Yes    Acute hypercapnic respiratory failure ([address] 1/15/2021 Yes        Assessment & Plan   Acute hypoxic respiratory failure  pleural effusion  DM  Dementia    Pulmonary following  Bipap  Wean as tolerateed  Continue rest of meds  Palliative care  Possible LTAC  Poor prognosis

## 2021-01-28 NOTE — PROGRESS NOTES
Liaison Informational Visit Note      Referral received from [facility]      Patient Name: Nickolas White   :  1934  MRN:  [id number]    Admit date:  1/15/2021      Hospital Admitting Physician:  Kam Castro MD   PCP:  Sandra Walters MD      Primary Insurance: Payor: Mahsa Schirmer /  /  /    Secondary Insurance:  unknown    Emergency Contact:      Contact/Relation:   /         Phone:       Contact/Relation:   /     Phone:     Advance Directive  Advance directives received No  Patient has a documented healthcare surrogate  Discussed with: Family member  DPOA-HC Name-Relation:    Phone:       Terminal Diagnosis ES COPD as confirmed by Dr. 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    Pneumonia J18.9    Hospital-acquired pneumonia J18.9, Y95    Acute respiratory failure with hypoxia ([address] J96.01    COPD exacerbation ([address] J44.1    Severe protein-calorie malnutrition ([address] E43    Acute hypercapnic respiratory failure ([address] J96.02       Code Status Order: DNR-CCA     Past Medical History:        Diagnosis Date    Acid reflux     Arthritis     COPD (chronic obstructive pulmonary disease) ([address]     Dementia ([address]     Diabetes mellitus ([address]     Hiatal hernia     Hyperlipidemia     Oxygen dependent      Past Surgical History:        Procedure Laterality Date    BREAST SURGERY      lumpectomy right and left \"cancer-free lumps\"    CATARACT REMOVAL WITH IMPLANT      left    CHOLECYSTECTOMY      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     Family Goal: Comfort care

## 2021-01-28 NOTE — PROGRESS NOTES
Patient slept through the night, VSS, was placed on bi-pap all night. Patient did take a break from the bi-pap and placed on NC, but patient de-stated quickly and needed to be put back on bi-pap continue to monitor.

## 2021-01-28 NOTE — PROGRESS NOTES
Date: 1/27/2021    Time: 8:38 PM    Patient Placed On BIPAP/CPAP/ Non-Invasive Ventilation? Yes    If no must comment. Facial area red/color change? No           If YES are Blister/Lesion present? No   If yes must notify nursing staff  BIPAP/CPAP skin barrier?   Yes    Skin barrier type:mepilexlite       Comments:        Sarina Martinez

## 2021-01-28 NOTE — PROGRESS NOTES
Pulmonary Progress Note  1/28/2021 3:51 PM  Subjective:   Admit Date: 1/15/2021  PCP: Dangelo Tirado MD  Interval History: not much improvement. On and off BIPAP 14/6, 40%  Currently on 6 L high flow nasal cannula  Refusing physical and Occupational Therapy    Diet: DIET DYSPHAGIA SOFT AND BITE-SIZED; Dysphagia Soft and Bite-Sized; Moderately Thick (Honey)  Dietary Nutrition Supplements: Other Oral Supplement (see comment)  Dietary Nutrition Supplements: Frozen Oral Supplement  SOB is: Moderate  Cough: None  Wheezing: None  chest pain: None    Data:   Scheduled Meds:    Arformoterol Tartrate  15 mcg Nebulization BID    predniSONE  10 mg Oral Daily    insulin glargine  5 Units Subcutaneous Nightly    insulin lispro  0-6 Units Subcutaneous TID WC    insulin lispro  0-3 Units Subcutaneous Nightly    enoxaparin  40 mg Subcutaneous Daily    miconazole nitrate   Topical BID    insulin lispro  0-6 Units Subcutaneous Nightly    donepezil  20 mg Oral Nightly    fluticasone  1 spray Nasal Daily    prednisoLONE acetate  1 drop Both Eyes Daily    rosuvastatin  10 mg Oral Daily    sodium chloride flush  10 mL Intravenous 2 times per day    ipratropium-albuterol  1 ampule Inhalation Q4H WA    pantoprazole  40 mg Oral QAM AC       Continuous Infusions:    dextrose         PRN Meds: Mineral Oil-Hydrophil Petrolat, miconazole nitrate **AND** miconazole nitrate, sodium chloride flush, promethazine **OR** ondansetron, polyethylene glycol, acetaminophen **OR** acetaminophen, glucose, dextrose, glucagon (rDNA), dextrose    No intake/output data recorded. No intake/output data recorded.       Intake/Output Summary (Last 24 hours) at 1/28/2021 1551  Last data filed at 1/28/2021 1029  Gross per 24 hour   Intake 0 ml   Output    Net 0 ml       Patient Vitals for the past 96 hrs (Last 3 readings):   Weight   01/28/21 0845 89 lb 11.2 oz (40.7 kg)   01/27/21 0810 89 lb (40.4 kg)   01/26/21 0523 93 lb 2 oz (42.2 kg) Recent Labs     01/26/21  0546 01/27/21  0611 01/28/21  0629   WBC 5.5 5.0 8.5   HGB 11.9 10.8* 11.5   HCT 38.9 35.7 37.1   .3* 100.8* 101.1*   * 111* 121*     Recent Labs     01/26/21  0546 01/27/21  0611 01/28/21  0629    138 139   K 4.5 4.7 4.6   CL 94* 94* 94*   CO2 43* 42* 39*   BUN 15 12 11   CREATININE 0.5 0.6 0.5         -----------------------------------------------------------------  RAD:   Results for orders placed during the hospital encounter of 01/15/21   XR CHEST PORTABLE  1/23:    Extensive bilateral parenchymal and interstitial fibrosis and moderate sized   bilateral effusions are unchanged.  Heart size is normal.                 Narrative EXAMINATION:  ONE XRAY VIEW OF THE CHEST  1/15/2021 10:57 am  COMPARISON:  11/08/2019  HISTORY:  ORDERING SYSTEM PROVIDED HISTORY: hypoxia  TECHNOLOGIST PROVIDED HISTORY:  Reason for exam:->hypoxia  What reading provider will be dictating this exam?->CRC  FINDINGS:  Significant bilateral parenchymal and interstitial fibrosis is unchanged. Small bilateral pleural effusions are suspected. Heart size is normal.  There is a moderate amount of aorto iliac arterial vascular plaque. Impression No significant change   1/23                                                                               1/15    Micro:  Results for Anna He (MRN [id number]) as of 1/23/2021 14:35   Ref.  Range 1/15/2021 11:09   Influenza A by PCR Latest Ref Range: Not Detected  Not Detected   Influenza B by PCR Latest Ref Range: Not Detected  Not Detected   Adenovirus by PCR Latest Ref Range: Not Detected  Not Detected   Coronavirus 229E by PCR Latest Ref Range: Not Detected  Not Detected   Coronavirus HKU1 by PCR Latest Ref Range: Not Detected  Not Detected   Coronavirus NL63 by PCR Latest Ref Range: Not Detected  Not Detected   Coronavirus OC43 by PCR Latest Ref Range: Not Detected  Not Detected Human Metapneumovirus by PCR Latest Ref Range: Not Detected  Not Detected   Human Rhinovirus/Enterovirus by PCR Latest Ref Range: Not Detected  Not Detected   Parainfluenza Virus 1 by PCR Latest Ref Range: Not Detected  Not Detected   Parainfluenza Virus 2 by PCR Latest Ref Range: Not Detected  Not Detected   Parainfluenza Virus 3 by PCR Latest Ref Range: Not Detected  Not Detected   Parainfluenza Virus 4 by PCR Latest Ref Range: Not Detected  Not Detected   Respiratory Syncytial Virus by PCR Latest Ref Range: Not Detected  Not Detected   Bordetella parapertussis by PCR Latest Ref Range: Not Detected  Not Detected   Chlamydophilia pneumoniae by PCR Latest Ref Range: Not Detected  Not Detected   Mycoplasma pneumoniae by PCR Latest Ref Range: Not Detected  Not Detected   SARS-CoV-2, PCR Latest Ref Range: Not Detected  Not Detected   Bordetella pertussis by PCR Latest Ref Range: Not Detected  Not Detected             Vent Information  Skin Assessment: Clean, dry, & intact  Equipment Changed: Humidification  Vt Ordered: 400 mL  FiO2 : 40 %  SpO2: 96 %  SpO2/FiO2 ratio: 245  I Time/ I Time %: 0.9 s  Mask Type: Full face mask  Mask Size: Small    Additional Respiratory  Assessments  Pulse: 72  Resp: 12  SpO2: 96 %    Objective:   Vitals:   Vitals:    21 1231   BP:    Pulse:    Resp: 12   Temp:    SpO2:       TEMP:Current: Temp: 97.1 °F (36.2 °C)  Max: Temp  Av.1 °F (36.7 °C)  Min: 97.1 °F (36.2 °C)  Max: 98.8 °F (37.1 °C)    BP Range: Systolic (27FOB), EIU:146 , Min:107 , NER:571     Diastolic (44IFA), AET:25, Min:53, Max:58      General appearance: thin built,  appears stated age and cooperative  In no acute distress  Skin: No rashes or lesions  HEENT: mucous membranes are moist  Neck: No JVD  Lungs: symmetrical expansion, coarse rales/rhonchi throughout  Mildly labored, no acute distress now on bipap 50% Fio2  Heart: S1S2 no murmurs,  Abdomen: soft, non tender, BS+  Extremities: no peripheral edema Neurologic: Alert, oriented times 3, KING  Affect: calm      Assessment:   Patient Active Problem List:      80 y.o. female 55-pack-year current smoker on chronic O2 at 4 L, diabetes, hypothyroid, DM, with history of dysphagia family refusing PEG tube presents on 1/15 with syncope and hypoxemia with change in mental status.     1. Acute on chronic hypercapnic respiratory failure   2. chronic hypoxemic respiratory failure baseline 4 L  3. CHF with elevated proBNP on Bumex  4. Echo showing EF of 68% mod MR mod pulm HTN EF nl DD 1 10/2019  5. Dysphasia swallow study 10/16/2019 aspiration with nectar consistency, 1/18 showing honey consistency  6. Baseline creatinine 0.4   7. Chronic  hypercapnic respiratory failure baseline PCO2 54 PO2 73 pH 7.48 as baseline  8. Moderate obstructive lung disease last FEV1 0.9 L 65% predicted no improvement with bronchodilators on 4/17/2019  9. dementia with CT showing white small vessel ischemic disease  10. History of calcified lung nodules and mediastinal lymphadenopathy reflective of granulomatous disease  11. COPD not wheezing   12. History of smoking at home family was allowing her to  cigarettes  13. DM  14. Dementia with CT showing small vessel ischemic disease  15. Hypothyroid   16. 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  recommend Bipap 4 hours on 4 hours off for now. Wean fio2 to keep spo2 90-95%  2. Cxr repeated 1/23--no change , failed MBS family is refusing peg tube, most likely is aspirating, extensive fibrosis   3. Finished course of Rocephin and doxy 1/22    4. IS for pulmonary hygiene  5. Continue LABA and oral corticosteroids  6. Palliative care consulted as terminal Lung disease and Hospice eval. Short & long term prognosis and quality of life are not good, unfortunately. Hospice placement in a.m.

## 2021-01-28 NOTE — CARE COORDINATION
Spoke with Zaida Evans, discharge plan is home with [facility]. Spoke with [facility] from [facility], delivery for supplies set up for tomorrow d/t family availability; will need Bi-PAP order from Dr. Jose Lim for discharge. Spoke with Klaus Palma case  manager from [address] [place] [address], [address] via phone [phone] and provided update, she will follow for additional needs. Zaida Evans, did relay that an home going oxygen order was required however[facility], just the Bi-PAP order for nighttime use. I relayed the aforementioned to charge JANNIE Valladares.

## 2021-01-28 NOTE — PROGRESS NOTES
Progress Note  Date:2021       TWAP:[id number]  Patient Name:Noemi Dunham     YOB: 1934     Age:86 y.o. Patient resting. Subjective    Subjective:  Symptoms:  Stable. She reports shortness of breath. Review of Systems   Constitutional: Positive for activity change. Negative for fever. Respiratory: Positive for shortness of breath. Objective         Vitals Last 24 Hours:  TEMPERATURE:  Temp  Av.1 °F (36.7 °C)  Min: 97 °F (36.1 °C)  Max: 98.8 °F (37.1 °C)  RESPIRATIONS RANGE: Resp  Av  Min: 18  Max: 28  PULSE OXIMETRY RANGE: SpO2  Av %  Min: 86 %  Max: 99 %  PULSE RANGE: Pulse  Av.5  Min: 65  Max: 90  BLOOD PRESSURE RANGE: Systolic (45ILQ), EZJ:094 , Min:103 , OSCAR:483   ; Diastolic (33BEA), QGN:33, Min:50, Max:61    I/O (24Hr): Intake/Output Summary (Last 24 hours) at 2021 0646  Last data filed at 2021 2231  Gross per 24 hour   Intake 240 ml   Output    Net 240 ml     Objective:  General Appearance:  Comfortable. Vital signs: (most recent): Blood pressure (!) 107/53, pulse 76, temperature 98.8 °F (37.1 °C), temperature source Temporal, resp. rate 25, height 5' 2\" (1.575 m), weight 89 lb (40.4 kg), SpO2 97 %. No fever. Lungs:  Normal effort. There are decreased breath sounds. Heart: Normal rate. Regular rhythm. S1 normal and S2 normal.      Labs/Imaging/Diagnostics    Labs:  CBC:  Recent Labs     21  0546 21  0611   WBC 5.5 5.0   RBC 3.84 3.54   HGB 11.9 10.8*   HCT 38.9 35.7   .3* 100.8*   RDW 13.4 13.2   * 111*     CHEMISTRIES:  Recent Labs     21  0546 21  0611    138   K 4.5 4.7   CL 94* 94*   CO2 43* 42*   BUN 15 12   CREATININE 0.5 0.6   GLUCOSE 114* 306*     PT/INR:  No results for input(s): PROTIME, INR in the last 72 hours. APTT:  No results for input(s): APTT in the last 72 hours.   LIVER PROFILE: No results for input(s): AST, ALT, BILIDIR, BILITOT, ALKPHOS in the last 72 hours. Imaging Last 24 Hours:  Ct Head Wo Contrast    Result Date: 1/15/2021  EXAMINATION: CT OF THE HEAD WITHOUT CONTRAST  1/15/2021 12:26 pm TECHNIQUE: CT of the head was performed without the administration of intravenous contrast. Dose modulation, iterative reconstruction, and/or weight based adjustment of the mA/kV was utilized to reduce the radiation dose to as low as reasonably achievable. COMPARISON: 09/11/2018 HISTORY: ORDERING SYSTEM PROVIDED HISTORY: AMS TECHNOLOGIST PROVIDED HISTORY: Reason for exam:->AMS Has a \"code stroke\" or \"stroke alert\" been called? ->No What reading provider will be dictating this exam?->CRC FINDINGS: BRAIN/VENTRICLES: There is no acute intracranial hemorrhage, mass effect or midline shift. No abnormal extra-axial fluid collection. The gray-white differentiation is maintained without evidence of an acute infarct. There is no evidence of hydrocephalus. The ventricles, cisterns and sulci are prominent consistent with atrophy. There is decreased attenuation within the periventricular white matter consistent with periventricular leukomalacia. ORBITS: The visualized portion of the orbits demonstrate no acute abnormality. SINUSES: The visualized paranasal sinuses and mastoid air cells demonstrate no acute abnormality. SOFT TISSUES/SKULL:  No acute abnormality of the visualized skull or soft tissues. 1. There is no acute intracranial abnormality. Specifically, there is no intracranial hemorrhage.  2. Atrophy and periventricular leukomalacia,    Xr Chest Portable    Result Date: 1/15/2021 EXAMINATION: ONE XRAY VIEW OF THE CHEST 1/15/2021 10:57 am COMPARISON: 11/08/2019 HISTORY: ORDERING SYSTEM PROVIDED HISTORY: hypoxia TECHNOLOGIST PROVIDED HISTORY: Reason for exam:->hypoxia What reading provider will be dictating this exam?->CRC FINDINGS: Significant bilateral parenchymal and interstitial fibrosis is unchanged. Small bilateral pleural effusions are suspected. Heart size is normal. There is a moderate amount of aorto iliac arterial vascular plaque. No significant change    Assessment//Plan           Hospital Problems           Last Modified POA    Severe protein-calorie malnutrition (HCC) (Chronic) 1/25/2021 Yes    Acute hypercapnic respiratory failure ([address] 1/15/2021 Yes        Assessment & Plan   Acute hypoxic respiratory failure  pleural effusion  DM  Dementia    Pulmonary following  Bipap  Wean as tolerateed  Continue rest of meds  Palliative care  Possible LTAC  Poor prognosis  Await plans from family.

## 2021-01-29 NOTE — PROGRESS NOTES
Spoke with Arley Hylton CNP. She will provide  comfort scripts for patient at discharge. Scripts taken to out patient pharmacy and will be filled prior to discharge. Reviewed medications with [facility]. Morphine, Ativan and Robinul are all hospice pay. Visit made to unit. Patient is awake, laying in bed. Denies any discomfort at this time. Asked RN to please discontinue IV site and send discharge instructions home with patient. Alvino Powell [place] [address] will  at 2pm. Faxed face sheet and ambulance form to [place]. Spoke with patient's daughter Brooklyn Fragoso and provided update on discharge time. Received call from Dr. Talya Connelly office. He will follow under hospice care at home. Received terminal diagnosis of ES COPD. Call placed to [facility] intake department. Spoke with Oumar Callahan LPN and provided update on discharge. An admission nurse will make a visit this afternoon. Jovanna Harden will reach out to family with a time of admission today.

## 2021-01-29 NOTE — PROGRESS NOTES
Progress Note  Date:2021       XFFE:[id number]  Patient Name:Noemi Dunham     YOB: 1934     Age:86 y.o. Patient resting. Subjective    Subjective:  Symptoms:  Stable. She reports shortness of breath. Review of Systems   Constitutional: Positive for activity change. Negative for fever. Respiratory: Positive for shortness of breath. Objective         Vitals Last 24 Hours:  TEMPERATURE:  Temp  Av.5 °F (36.4 °C)  Min: 97.1 °F (36.2 °C)  Max: 98.1 °F (36.7 °C)  RESPIRATIONS RANGE: Resp  Av.3  Min: 20  Max: 22  PULSE OXIMETRY RANGE: SpO2  Av.8 %  Min: 95 %  Max: 97 %  PULSE RANGE: Pulse  Av.3  Min: 59  Max: 72  BLOOD PRESSURE RANGE: Systolic (35VHN), EYM:911 , Min:95 , YANES:516   ; Diastolic (03LLP), PLV:54, Min:53, Max:58    I/O (24Hr): Intake/Output Summary (Last 24 hours) at 2021 0611  Last data filed at 2021 2242  Gross per 24 hour   Intake 60 ml   Output    Net 60 ml     Objective:  General Appearance:  Comfortable. Vital signs: (most recent): Blood pressure (!) 95/54, pulse 59, temperature 97.2 °F (36.2 °C), temperature source Temporal, resp. rate 21, height 5' 2\" (1.575 m), weight 93 lb 11.2 oz (42.5 kg), SpO2 97 %. No fever. Lungs:  Normal effort. There are decreased breath sounds. Heart: Normal rate. Regular rhythm. S1 normal and S2 normal.      Labs/Imaging/Diagnostics    Labs:  CBC:  Recent Labs     21  0611 21  0629   WBC 5.0 8.5   RBC 3.54 3.67   HGB 10.8* 11.5   HCT 35.7 37.1   .8* 101.1*   RDW 13.2 13.2   * 121*     CHEMISTRIES:  Recent Labs     21  0611 21  0629    139   K 4.7 4.6   CL 94* 94*   CO2 42* 39*   BUN 12 11   CREATININE 0.6 0.5   GLUCOSE 306* 277*     PT/INR:  No results for input(s): PROTIME, INR in the last 72 hours. APTT:  No results for input(s): APTT in the last 72 hours.   LIVER PROFILE: No results for input(s): AST, ALT, BILIDIR, BILITOT, ALKPHOS in the last 72 hours. Imaging Last 24 Hours:  Ct Head Wo Contrast    Result Date: 1/15/2021  EXAMINATION: CT OF THE HEAD WITHOUT CONTRAST  1/15/2021 12:26 pm TECHNIQUE: CT of the head was performed without the administration of intravenous contrast. Dose modulation, iterative reconstruction, and/or weight based adjustment of the mA/kV was utilized to reduce the radiation dose to as low as reasonably achievable. COMPARISON: 09/11/2018 HISTORY: ORDERING SYSTEM PROVIDED HISTORY: AMS TECHNOLOGIST PROVIDED HISTORY: Reason for exam:->AMS Has a \"code stroke\" or \"stroke alert\" been called? ->No What reading provider will be dictating this exam?->CRC FINDINGS: BRAIN/VENTRICLES: There is no acute intracranial hemorrhage, mass effect or midline shift. No abnormal extra-axial fluid collection. The gray-white differentiation is maintained without evidence of an acute infarct. There is no evidence of hydrocephalus. The ventricles, cisterns and sulci are prominent consistent with atrophy. There is decreased attenuation within the periventricular white matter consistent with periventricular leukomalacia. ORBITS: The visualized portion of the orbits demonstrate no acute abnormality. SINUSES: The visualized paranasal sinuses and mastoid air cells demonstrate no acute abnormality. SOFT TISSUES/SKULL:  No acute abnormality of the visualized skull or soft tissues. 1. There is no acute intracranial abnormality. Specifically, there is no intracranial hemorrhage.  2. Atrophy and periventricular leukomalacia,    Xr Chest Portable    Result Date: 1/15/2021 EXAMINATION: ONE XRAY VIEW OF THE CHEST 1/15/2021 10:57 am COMPARISON: 11/08/2019 HISTORY: ORDERING SYSTEM PROVIDED HISTORY: hypoxia TECHNOLOGIST PROVIDED HISTORY: Reason for exam:->hypoxia What reading provider will be dictating this exam?->CRC FINDINGS: Significant bilateral parenchymal and interstitial fibrosis is unchanged. Small bilateral pleural effusions are suspected. Heart size is normal. There is a moderate amount of aorto iliac arterial vascular plaque.     No significant change    Assessment//Plan           Hospital Problems           Last Modified POA    Severe protein-calorie malnutrition (HCC) (Chronic) 1/25/2021 Yes    Acute hypercapnic respiratory failure ([address] 1/15/2021 Yes        Assessment & Plan   Acute hypoxic respiratory failure  pleural effusion  DM  Dementia    Pulmonary following  Bipap  Wean as tolerateed  Continue rest of meds  Palliative care  Hospice

## 2021-01-29 NOTE — PROGRESS NOTES
CLINICAL PHARMACY NOTE: MEDS TO [address] Select Patient?: No  Total # of Prescriptions Filled: 3   The following medications were delivered to the patient:  · Morphine sulfate concentrated 100soln  · Glycopyrrolate 1mg  · Lorazepam 1mg  Total # of Interventions Completed: 3  Time Spent (min): 30    Additional Documentation:  Delivered to RN

## 2021-01-29 NOTE — DISCHARGE SUMMARY
Discharge Summary    Date: 1/29/2021  Patient Name: Fransisco Chou YOB: 1934 Age: 80 y.o. Admit Date: 1/15/2021  Discharge Date: 1/29/2021  Discharge Condition: Poor    Admission Diagnosis  Acute hypercapnic respiratory failure (HCC) (J96.02); Acute hypercapnic respiratory failure (HCC) (J96.02)     Discharge Diagnosis  Active Problems: Severe protein-calorie malnutrition (HCC) Acute hypercapnic respiratory failure (HCC)Resolved Problems: * No resolved hospital problems. [facility] Stay  Narrative of Hospital Course:  Patient admitted with respiratory failure  Ultimately declined and family chose hospice after extended admission. Consultants:  IP CONSULT TO INTERNAL MEDICINEIP CONSULT TO PULMONOLOGYIP CONSULT TO PULMONOLOGYIP CONSULT TO PALLIATIVE CAREIP CONSULT TO SOCIAL WORKIP CONSULT TO HOSPICE    Surgeries/procedures Performed:       Treatments:           Discharge Plan/Disposition:  Home    Hospital/Incidental Findings Requiring Follow Up:    Patient Instructions:    Diet: Regular Diet    Activity:Bedrest  For number of days (if applicable): Other Instructions:    Provider Follow-Up:   No follow-ups on file. Significant Diagnostic Studies:    Recent Labs:  Admission on 01/15/2021No results displayed because visit has over 200 results. ------------    Radiology last 7 days:  Xr Chest PortableResult Date: 1/23/2021No interval change     Pending Labs  Order Current Status  Arterial Blood Gas, Respiratory Only Collected (01/23/21 1523)      Discharge Medications Current Discharge Medication ListSTOP taking these medicationsDonepezil HCl (ARICEPT) 23 MG TABS tabletComments:Reason for Stopping:doxycycline hyclate (VIBRA-TABS) 100 MG tabletComments:Reason for Stopping:bumetanide (BUMEX) 1 MG tabletComments:Reason for Stopping:meloxicam (MOBIC) 7.5 MG tabletComments:Reason for Stopping:fluticasone (FLONASE) 50 MCG/ACT nasal sprayComments:Reason for Stopping:Umeclidinium Bromide (INCRUSE ELLIPTA) 62.5 MCG/INH AEPBComments:Reason for Stopping:tiotropium (SPIRIVA) 18 MCG inhalation capsuleComments:Reason for Stopping:rosuvastatin (CRESTOR) 10 MG tabletComments:Reason for Stopping:omeprazole (PRILOSEC) 20 MG delayed release capsuleComments:Reason for Stopping:Insulin Degludec (TRESIBA FLEXTOUCH) 100 UNIT/ML SOPNComments:Reason for Stopping:insulin aspart (NOVOLOG FLEXPEN) 100 UNIT/ML injection penComments:Reason for Stopping:budesonide (PULMICORT) 0.5 MG/2ML nebulizer suspensionComments:Reason for Stopping:formoterol (PERFOROMIST) 20 MCG/2ML nebulizer solutionComments:Reason for Stopping:ipratropium-albuterol (DUONEB) 0.5-2.5 (3) MG/3ML SOLN nebulizer solutionComments:Reason for Stopping:cyanocobalamin 1000 MCG/ML injectionComments:Reason for Stopping:acetaminophen (TYLENOL) 500 MG tabletComments:Reason for Stopping:prednisoLONE acetate (PRED FORTE) 1 % ophthalmic suspensionComments:Reason for Stopping:calcitonin, salmon, (MIACALCIN) 200 UNIT/ACT nasal sprayComments:Reason for Stopping:    Time Spent on Discharge:1E] minutes were spent in patient examination, evaluation, counseling as well as medication reconciliation, prescriptions for required medications, discharge plan, and follow up.     Electronically signed by Gracia Jean Baptiste MD on 1/29/21 at 11:16 AM EST

## 2021-01-29 NOTE — PROGRESS NOTES
Comfort scripts provided to hospice laision. D/c with hospice today at 2pm. No further needs from palliative medicine standpoint.

## 2021-12-19 NOTE — PROGRESS NOTES
Progress Note  Date:2021       EASQ:[id number]-[id number]  Patient Name:Noemi Dunham     YOB: 1934     Age:86 y.o. Patient resting , says breathing is about the same. Subjective    Subjective:  Symptoms:  Stable. She reports shortness of breath. No chest pain. Review of Systems   Constitutional: Positive for activity change. Negative for fever. Respiratory: Positive for shortness of breath. Cardiovascular: Negative for chest pain. Objective         Vitals Last 24 Hours:  TEMPERATURE:  Temp  Av.3 °F (36.8 °C)  Min: 97.5 °F (36.4 °C)  Max: 98.8 °F (37.1 °C)  RESPIRATIONS RANGE: Resp  Av.5  Min: 14  Max: 20  PULSE OXIMETRY RANGE: SpO2  Av.5 %  Min: 82 %  Max: 98 %  PULSE RANGE: Pulse  Av.5  Min: 56  Max: 83  BLOOD PRESSURE RANGE: Systolic (94GZY), FWZ:869 , Min:101 , ENR:702   ; Diastolic (54YHK), PRD:08, Min:51, Max:56    I/O (24Hr): Intake/Output Summary (Last 24 hours) at 2021 0649  Last data filed at 2021 1325  Gross per 24 hour   Intake 120 ml   Output    Net 120 ml     Objective:  General Appearance:  Comfortable. Vital signs: (most recent): Blood pressure (!) 108/56, pulse 56, temperature 98.6 °F (37 °C), temperature source Temporal, resp. rate 16, height 5' 2\" (1.575 m), weight 103 lb 6.4 oz (46.9 kg), SpO2 96 %. No fever. Lungs:  Normal effort. There are decreased breath sounds. Heart: Normal rate. Regular rhythm.   S1 normal and S2 normal.      Labs/Imaging/Diagnostics    Labs:  CBC:  Recent Labs     21  0612 21  1256 21  0545   WBC 4.7 6.0 4.6   RBC 3.96 3.62 3.46*   HGB 12.2 11.5 10.8*   HCT 40.6 36.9 35.8   .5* 101.9* 103.5*   RDW 13.2 13.4 13.4    198 158     CHEMISTRIES:  Recent Labs     01/15/21  1055 01/15/21  1216 21  0612 21  1256     --  142 140   K 4.4  --  5.4* 5.5*   CL 90*  --  96* 94*   CO2 36*  --  38* 38*   BUN 28*  --  29* 37*   CREATININE 1.0  --  0.8 0.8 GLUCOSE 234* 209 76 161*   MG 2.1  --   --   --      PT/INR:  Recent Labs     01/15/21  1055   PROTIME 13.4*   INR 1.2     APTT:  Recent Labs     01/15/21  1055   APTT 23.8*     LIVER PROFILE:  Recent Labs     01/15/21  1055   AST 70*   ALT 25   BILITOT 0.7   ALKPHOS 136*       Imaging Last 24 Hours:  Ct Head Wo Contrast    Result Date: 1/15/2021  EXAMINATION: CT OF THE HEAD WITHOUT CONTRAST  1/15/2021 12:26 pm TECHNIQUE: CT of the head was performed without the administration of intravenous contrast. Dose modulation, iterative reconstruction, and/or weight based adjustment of the mA/kV was utilized to reduce the radiation dose to as low as reasonably achievable. COMPARISON: 09/11/2018 HISTORY: ORDERING SYSTEM PROVIDED HISTORY: AMS TECHNOLOGIST PROVIDED HISTORY: Reason for exam:->AMS Has a \"code stroke\" or \"stroke alert\" been called? ->No What reading provider will be dictating this exam?->CRC FINDINGS: BRAIN/VENTRICLES: There is no acute intracranial hemorrhage, mass effect or midline shift. No abnormal extra-axial fluid collection. The gray-white differentiation is maintained without evidence of an acute infarct. There is no evidence of hydrocephalus. The ventricles, cisterns and sulci are prominent consistent with atrophy. There is decreased attenuation within the periventricular white matter consistent with periventricular leukomalacia. ORBITS: The visualized portion of the orbits demonstrate no acute abnormality. SINUSES: The visualized paranasal sinuses and mastoid air cells demonstrate no acute abnormality. SOFT TISSUES/SKULL:  No acute abnormality of the visualized skull or soft tissues. 1. There is no acute intracranial abnormality. Specifically, there is no intracranial hemorrhage.  2. Atrophy and periventricular leukomalacia,    Xr Chest Portable    Result Date: 1/15/2021 EXAMINATION: ONE XRAY VIEW OF THE CHEST 1/15/2021 10:57 am COMPARISON: 11/08/2019 HISTORY: ORDERING SYSTEM PROVIDED HISTORY: hypoxia TECHNOLOGIST PROVIDED HISTORY: Reason for exam:->hypoxia What reading provider will be dictating this exam?->CRC FINDINGS: Significant bilateral parenchymal and interstitial fibrosis is unchanged. Small bilateral pleural effusions are suspected. Heart size is normal. There is a moderate amount of aorto iliac arterial vascular plaque. No significant change    Assessment//Plan           Hospital Problems           Last Modified POA    Acute hypercapnic respiratory failure ([address] 1/15/2021 Yes        Assessment:  (Acute hypoxic respiratory failure  ).    pleural effusion  DM  Dementia    Pulmonary following  Steroids, nebs  Wean as tolerateed  Continue rest of meds patient

## (undated) DEVICE — 3 ML SYRINGE LUER-LOCK TIP: Brand: MONOJECT

## (undated) DEVICE — 40436 HEAD REST OCULAR: Brand: 40436 HEAD REST OCULAR

## (undated) DEVICE — PAD PREP L 2 PLY 70% ISO ALC NONWOVEN SFT ABSRB TOP ANTISEP

## (undated) DEVICE — SUTURE ETHLN SZ 8-0 L12IN NONABSORBABLE BLK CS175-8 L7MM 9038G

## (undated) DEVICE — 4-PORT MANIFOLD: Brand: NEPTUNE 2

## (undated) DEVICE — SOLUTION IRRIG BSS ST 500ML

## (undated) DEVICE — SOLUTION IRRIGATION BAL SALT SOLUTION 15 ML STRL BSS

## (undated) DEVICE — SET PHACO

## (undated) DEVICE — PEN: MARKING STD 100/CS: Brand: MEDICAL ACTION INDUSTRIES

## (undated) DEVICE — PACK PROCEDURE SURG SURG CATARACT CUSTOM

## (undated) DEVICE — KNIFE OPHTH D5MM 15DEG GRN MICUNITOM

## (undated) DEVICE — SPONGE EYE L7CM NAT CELOS SPEAR VISITEC VISISPEAR

## (undated) DEVICE — BARRON VACUUM PUNCH 8.5MM: Brand: CORNEAL TREPHINE

## (undated) DEVICE — SHIELD EYE W3XL2.5IN UNIV CLR PLAS LTWT

## (undated) DEVICE — NEEDLE HYPO 30GA L0.5IN BGE POLYPR HUB S STL REG BVL STR